# Patient Record
Sex: FEMALE | Race: BLACK OR AFRICAN AMERICAN | Employment: UNEMPLOYED | URBAN - METROPOLITAN AREA
[De-identification: names, ages, dates, MRNs, and addresses within clinical notes are randomized per-mention and may not be internally consistent; named-entity substitution may affect disease eponyms.]

---

## 2018-06-07 PROBLEM — F31.9 BIPOLAR DISORDER (HCC): Status: ACTIVE | Noted: 2018-06-07

## 2018-06-07 PROBLEM — K51.90 ULCERATIVE COLITIS (HCC): Status: ACTIVE | Noted: 2018-06-07

## 2018-06-07 PROBLEM — Z12.39 SCREENING FOR BREAST CANCER: Status: ACTIVE | Noted: 2018-06-07

## 2018-06-07 PROBLEM — N60.02 BREAST CYST, LEFT: Status: ACTIVE | Noted: 2018-06-07

## 2018-06-07 PROBLEM — I10 ESSENTIAL HYPERTENSION: Status: ACTIVE | Noted: 2018-06-07

## 2018-06-07 PROBLEM — K64.1 GRADE II HEMORRHOIDS: Status: ACTIVE | Noted: 2018-06-07

## 2018-07-26 PROBLEM — A60.09 HERPES GENITALIS IN WOMEN: Status: ACTIVE | Noted: 2018-07-26

## 2018-10-09 ENCOUNTER — APPOINTMENT (OUTPATIENT)
Dept: CT IMAGING | Age: 58
End: 2018-10-09
Attending: EMERGENCY MEDICINE
Payer: MEDICARE

## 2018-10-09 ENCOUNTER — HOSPITAL ENCOUNTER (EMERGENCY)
Age: 58
Discharge: HOME | End: 2018-10-09
Attending: EMERGENCY MEDICINE | Admitting: EMERGENCY MEDICINE
Payer: MEDICARE

## 2018-10-09 VITALS
HEIGHT: 67 IN | WEIGHT: 182 LBS | DIASTOLIC BLOOD PRESSURE: 76 MMHG | HEART RATE: 74 BPM | OXYGEN SATURATION: 99 % | SYSTOLIC BLOOD PRESSURE: 115 MMHG | BODY MASS INDEX: 28.56 KG/M2 | RESPIRATION RATE: 16 BRPM | TEMPERATURE: 98.6 F

## 2018-10-09 DIAGNOSIS — R19.7 DIARRHEA, UNSPECIFIED TYPE: ICD-10-CM

## 2018-10-09 DIAGNOSIS — R10.84 ABDOMINAL PAIN, GENERALIZED: Primary | ICD-10-CM

## 2018-10-09 LAB
ALBUMIN SERPL-MCNC: 3.1 G/DL (ref 3.5–5)
ALBUMIN/GLOB SERPL: 1 {RATIO} (ref 1.1–2.2)
ALP SERPL-CCNC: 67 U/L (ref 45–117)
ALT SERPL-CCNC: 20 U/L (ref 12–78)
ANION GAP SERPL CALC-SCNC: 8 MMOL/L (ref 5–15)
APPEARANCE UR: CLEAR
AST SERPL-CCNC: 26 U/L (ref 15–37)
BACTERIA URNS QL MICRO: ABNORMAL /HPF
BASOPHILS # BLD: 0 K/UL (ref 0–0.1)
BASOPHILS NFR BLD: 0 % (ref 0–1)
BILIRUB SERPL-MCNC: 0.3 MG/DL (ref 0.2–1)
BILIRUB UR QL: NEGATIVE
BUN SERPL-MCNC: 13 MG/DL (ref 6–20)
BUN/CREAT SERPL: 24 (ref 12–20)
CALCIUM SERPL-MCNC: 7.3 MG/DL (ref 8.5–10.1)
CHLORIDE SERPL-SCNC: 115 MMOL/L (ref 97–108)
CO2 SERPL-SCNC: 21 MMOL/L (ref 21–32)
COLOR UR: ABNORMAL
CREAT SERPL-MCNC: 0.55 MG/DL (ref 0.55–1.02)
DIFFERENTIAL METHOD BLD: ABNORMAL
EOSINOPHIL # BLD: 0.1 K/UL (ref 0–0.4)
EOSINOPHIL NFR BLD: 1 % (ref 0–7)
EPITH CASTS URNS QL MICRO: ABNORMAL /LPF
ERYTHROCYTE [DISTWIDTH] IN BLOOD BY AUTOMATED COUNT: 15.6 % (ref 11.5–14.5)
GLOBULIN SER CALC-MCNC: 3 G/DL (ref 2–4)
GLUCOSE SERPL-MCNC: 66 MG/DL (ref 65–100)
GLUCOSE UR STRIP.AUTO-MCNC: NEGATIVE MG/DL
HCT VFR BLD AUTO: 35.6 % (ref 35–47)
HGB BLD-MCNC: 11.6 G/DL (ref 11.5–16)
HGB UR QL STRIP: ABNORMAL
IMM GRANULOCYTES # BLD: 0 K/UL (ref 0–0.04)
IMM GRANULOCYTES NFR BLD AUTO: 0 % (ref 0–0.5)
KETONES UR QL STRIP.AUTO: NEGATIVE MG/DL
LEUKOCYTE ESTERASE UR QL STRIP.AUTO: ABNORMAL
LIPASE SERPL-CCNC: 161 U/L (ref 73–393)
LYMPHOCYTES # BLD: 1.8 K/UL (ref 0.8–3.5)
LYMPHOCYTES NFR BLD: 23 % (ref 12–49)
MCH RBC QN AUTO: 32.3 PG (ref 26–34)
MCHC RBC AUTO-ENTMCNC: 32.6 G/DL (ref 30–36.5)
MCV RBC AUTO: 99.2 FL (ref 80–99)
MONOCYTES # BLD: 0.5 K/UL (ref 0–1)
MONOCYTES NFR BLD: 7 % (ref 5–13)
NEUTS SEG # BLD: 5.5 K/UL (ref 1.8–8)
NEUTS SEG NFR BLD: 69 % (ref 32–75)
NITRITE UR QL STRIP.AUTO: NEGATIVE
NRBC # BLD: 0 K/UL (ref 0–0.01)
NRBC BLD-RTO: 0 PER 100 WBC
PH UR STRIP: 5.5 [PH] (ref 5–8)
PLATELET # BLD AUTO: 179 K/UL (ref 150–400)
PMV BLD AUTO: 10 FL (ref 8.9–12.9)
POTASSIUM SERPL-SCNC: 4.4 MMOL/L (ref 3.5–5.1)
PROT SERPL-MCNC: 6.1 G/DL (ref 6.4–8.2)
PROT UR STRIP-MCNC: NEGATIVE MG/DL
RBC # BLD AUTO: 3.59 M/UL (ref 3.8–5.2)
RBC #/AREA URNS HPF: ABNORMAL /HPF (ref 0–5)
SODIUM SERPL-SCNC: 144 MMOL/L (ref 136–145)
SP GR UR REFRACTOMETRY: 1.01 (ref 1–1.03)
UR CULT HOLD, URHOLD: NORMAL
UROBILINOGEN UR QL STRIP.AUTO: 0.2 EU/DL (ref 0.2–1)
WBC # BLD AUTO: 8 K/UL (ref 3.6–11)
WBC URNS QL MICRO: ABNORMAL /HPF (ref 0–4)
YEAST BUDDING URNS QL: PRESENT

## 2018-10-09 PROCEDURE — 36415 COLL VENOUS BLD VENIPUNCTURE: CPT | Performed by: EMERGENCY MEDICINE

## 2018-10-09 PROCEDURE — 74177 CT ABD & PELVIS W/CONTRAST: CPT

## 2018-10-09 PROCEDURE — 96374 THER/PROPH/DIAG INJ IV PUSH: CPT

## 2018-10-09 PROCEDURE — 74011250636 HC RX REV CODE- 250/636: Performed by: EMERGENCY MEDICINE

## 2018-10-09 PROCEDURE — 74011000258 HC RX REV CODE- 258: Performed by: EMERGENCY MEDICINE

## 2018-10-09 PROCEDURE — 96375 TX/PRO/DX INJ NEW DRUG ADDON: CPT

## 2018-10-09 PROCEDURE — 85025 COMPLETE CBC W/AUTO DIFF WBC: CPT | Performed by: EMERGENCY MEDICINE

## 2018-10-09 PROCEDURE — 81001 URINALYSIS AUTO W/SCOPE: CPT | Performed by: EMERGENCY MEDICINE

## 2018-10-09 PROCEDURE — 99284 EMERGENCY DEPT VISIT MOD MDM: CPT

## 2018-10-09 PROCEDURE — 80053 COMPREHEN METABOLIC PANEL: CPT | Performed by: EMERGENCY MEDICINE

## 2018-10-09 PROCEDURE — 74011636320 HC RX REV CODE- 636/320: Performed by: EMERGENCY MEDICINE

## 2018-10-09 PROCEDURE — 83690 ASSAY OF LIPASE: CPT | Performed by: EMERGENCY MEDICINE

## 2018-10-09 RX ORDER — SODIUM CHLORIDE 0.9 % (FLUSH) 0.9 %
10 SYRINGE (ML) INJECTION
Status: COMPLETED | OUTPATIENT
Start: 2018-10-09 | End: 2018-10-09

## 2018-10-09 RX ORDER — ONDANSETRON 2 MG/ML
4 INJECTION INTRAMUSCULAR; INTRAVENOUS
Status: COMPLETED | OUTPATIENT
Start: 2018-10-09 | End: 2018-10-09

## 2018-10-09 RX ORDER — MORPHINE SULFATE 2 MG/ML
2 INJECTION, SOLUTION INTRAMUSCULAR; INTRAVENOUS
Status: COMPLETED | OUTPATIENT
Start: 2018-10-09 | End: 2018-10-09

## 2018-10-09 RX ORDER — SODIUM CHLORIDE 0.9 % (FLUSH) 0.9 %
10 SYRINGE (ML) INJECTION
Status: DISCONTINUED | OUTPATIENT
Start: 2018-10-09 | End: 2018-10-09 | Stop reason: HOSPADM

## 2018-10-09 RX ORDER — LOPERAMIDE HCL 2 MG
2 TABLET ORAL
Qty: 16 TAB | Refills: 0 | Status: SHIPPED | OUTPATIENT
Start: 2018-10-09 | End: 2018-10-19

## 2018-10-09 RX ADMIN — IOPAMIDOL 100 ML: 755 INJECTION, SOLUTION INTRAVENOUS at 17:15

## 2018-10-09 RX ADMIN — Medication 10 ML: at 17:16

## 2018-10-09 RX ADMIN — SODIUM CHLORIDE 100 ML: 900 INJECTION, SOLUTION INTRAVENOUS at 17:16

## 2018-10-09 RX ADMIN — ONDANSETRON 4 MG: 2 INJECTION INTRAMUSCULAR; INTRAVENOUS at 15:05

## 2018-10-09 RX ADMIN — MORPHINE SULFATE 2 MG: 2 INJECTION, SOLUTION INTRAMUSCULAR; INTRAVENOUS at 15:08

## 2018-10-09 NOTE — ED PROVIDER NOTES
HPI Comments: Honey Cook is a 59 y.o. female who presents to the ED via EMS with a c/o abdominal pain onset yesterday. Pt currently rates her pain at 10/10 in severity and states that her pain worsened after eating a hamburger yesterday. Pt also reports that she has had multiple episodes of n/v (intermittently tinged with blood) and also reports bright red blood in her stool. Of note, pt states that she had a CT scan on 9/7/18 in [place] which was abnormal and pt was given GI follow up instructions. Pt states that she has a appointment with Dr. Andrew Frias on Thursday (10/11/18). Pt specifically denies chest pain, shortness of breath, fever, chills, numbness, tingling, back pain, cough, leg swelling, dizziness or any other acute sx. Pt denies any recent travel, known sick contacts, or recent illness. PCP: None PMHx significant for: Chronic Pancreatitis, HTn, DM, Hepatitis C, Cryoglobulinemia, Schizophrenia PSHx significant for: Hysterectomy Social Hx: Tobacco: none EtOH: occaisional Illicit drug use: none There are no further complaints or symptoms at this time. Signed by: Daniel Harding, rosalvaibing for Pardeep Man on October 9th, 2018 at 13:28pm. 
 
 
The history is provided by the patient and medical records. No  was used. Past Medical History:  
Diagnosis Date  Cryoglobulinemia ([facility]  Diabetes ([facility]  Hepatitis C   
 Hypertension  Schizophrenia ([facility] [address] Past Surgical History:  
Procedure Laterality Date  HX HYSTERECTOMY History reviewed. No pertinent family history. Social History Social History  Marital status: SINGLE Spouse name: N/A  
 Number of children: N/A  
 Years of education: N/A Occupational History  Not on file. Social History Main Topics  Smoking status: Never Smoker  Smokeless tobacco: Never Used  Alcohol use Yes  Drug use: No  
 Sexual activity: Not on file Other Topics Concern  Not on file Social History Narrative ALLERGIES: Actos [pioglitazone]; Bactrim [sulfamethoprim ds]; Dicyclomine; Erythromycin; Keflex [cephalexin]; Macrobid [nitrofurantoin monohyd/m-cryst]; Pcn [penicillins]; Prednisone; and Sulfa (sulfonamide antibiotics) Review of Systems Constitutional: Negative for chills and fever. Respiratory: Negative for cough and shortness of breath. Cardiovascular: Negative for chest pain. Gastrointestinal: Positive for abdominal pain, blood in stool, diarrhea, nausea and vomiting. Genitourinary: Negative for dysuria and hematuria. Musculoskeletal: Negative for back pain and neck pain. Neurological: Negative for dizziness and headaches. All other systems reviewed and are negative. Vitals:  
 10/09/18 1218 10/09/18 1300 BP: 115/76 Pulse: 74 Resp: 16 Temp: 98.6 °F (37 °C) SpO2: 97% 99% Weight: 82.6 kg (182 lb) Height: 5' 7\" (1.702 m) Physical Exam  
Constitutional: She is oriented to person, place, and time. She appears well-developed and well-nourished. No distress. HENT:  
Head: Normocephalic and atraumatic. Eyes: Pupils are equal, round, and reactive to light. Neck: Normal range of motion. Neck supple. Cardiovascular: Normal rate, regular rhythm and normal heart sounds. Exam reveals no gallop and no friction rub. No murmur heard. Pulmonary/Chest: Effort normal and breath sounds normal. No respiratory distress. She has no wheezes. Abdominal: Soft. Bowel sounds are normal. She exhibits no distension. There is tenderness (with palpation) in the epigastric area and left upper quadrant. There is no rebound and no guarding. Musculoskeletal: Normal range of motion. Neurological: She is alert and oriented to person, place, and time. Skin: Skin is warm. No rash noted. She is not diaphoretic. Psychiatric: She has a normal mood and affect.  Her behavior is normal. Judgment and thought content normal.  
 Nursing note and vitals reviewed. Signed by: Miranda Vizcaino, scribing for Brit Man on October 9th, 2018 at 13:28pm. 
 
 
[facility]M 
 
 
ED Course This is a 68-year-old female with past medical history, review of systems, physical exam as above, presenting with complaints of abdominal pain. Patient states the abdominal pain began yesterday after eating a hamburger, complains of bloody liquid stool. She states she has a history of idiopathic pancreatitis, diverticulosis, and diverticulitis. She states she is scheduled to follow with GI bleed of this week. Physical exam is remarkable for well-appearing elderly female in no acute distress with clear breath sounds, otherwise abdominal tenderness, worse in the epigastric and left upper quadrant, clear breath sounds, noted to be afebrile without tachycardia or hypotension. Differential includes pancreatitis, pancreatic pseudocyst, diverticulosis versus diverticulitis versus gastroenteritis. Plan to provide pain control, antiemetics, obtain CMP, CBC, UA, lipase, CT scan of the abdomen and pelvis. We will reevaluate, and make a disposition based the patient's diagnostics and response to therapy. Procedures BESIDE SIGN OUT: 
4:32 PM 
Discussed pt's hx, disposition, and available diagnostic and imaging results with Dr. Dena Jimenez at bedside with the patient. Reviewed care plans. Both providers and patient are in agreement with care plan. Dr. Keeley Domingo is transferring care of the pt to Dr. Dena Jimenez at this time.

## 2018-10-09 NOTE — ED NOTES
4:45 PM 
Change of shift. Care of patient taken over from Dr. Erma Andrade; H&P reviewed, bedside handoff complete. 6:46 PM 
Patient's results have been reviewed with them. Patient and/or family have verbally conveyed their understanding and agreement of the patient's signs, symptoms, diagnosis, treatment and prognosis and additionally agree to follow up as recommended or return to the Emergency Room should their condition change prior to follow-up. Discharge instructions have also been provided to the patient with some educational information regarding their diagnosis as well a list of reasons why they would want to return to the ER prior to their follow-up appointment should their condition change.

## 2018-10-09 NOTE — ED TRIAGE NOTES
Patient arrives via EMS c/o generalized abdominal pain, n/v/d, red tinged emesis and some blood noted in her adult brief. Patient states she had a hamburger from [employer] yesterday which made the pain worse. Patient has chronic pancreatitis. Patient also states she is out of her psych medications

## 2018-10-11 ENCOUNTER — HOSPITAL ENCOUNTER (EMERGENCY)
Age: 58
Discharge: HOME | End: 2018-10-12
Attending: EMERGENCY MEDICINE | Admitting: EMERGENCY MEDICINE
Payer: MEDICARE

## 2018-10-11 VITALS
SYSTOLIC BLOOD PRESSURE: 122 MMHG | TEMPERATURE: 97.8 F | HEART RATE: 87 BPM | RESPIRATION RATE: 16 BRPM | BODY MASS INDEX: 27.61 KG/M2 | OXYGEN SATURATION: 98 % | DIASTOLIC BLOOD PRESSURE: 80 MMHG | WEIGHT: 175.93 LBS | HEIGHT: 67 IN

## 2018-10-11 DIAGNOSIS — K60.2 ANAL FISSURE: Primary | ICD-10-CM

## 2018-10-11 PROCEDURE — 99282 EMERGENCY DEPT VISIT SF MDM: CPT

## 2018-10-12 NOTE — ED PROVIDER NOTES
HPI Comments: 59 y.o. female with past medical history significant for HTN, DM, h/o hepatitis C, idiopathic pancreatitis, schizophrenia, presents ambulatory to the ED with chief complaint of severe rectal pain x 2 days. Patient states the pain is sharp, burning, and stabbing in quality. It is constant, but exacerbated with sitting. She rates her current level of discomfort as a 10/10. Patient states that even though her rectal pain has only been present for the past two days, she had diarrhea and rectal bleeding before the rectal pain started. Was seen in the [address] [place] [address] ED two days ago and had blood work, urine, as well as CT abdomen/pelvis performed that were all unremarkable. Patient reports that she followed up with GI earlier today (patient does not remember the name of the physician) to discuss her pancreatitis. Notes that she does not have a colonoscopy scheduled, but notes that the study was discussed. Patient did not discuss her rectal pain with the GI specialist, and did not have a rectal exam performed. Patient reports that she has also been seen in [facility] for her pancreatitis in the past. She specifically denies any fevers, nausea, vomiting, shortness of breath, and chest pain. There are no other acute medical concerns at this time. Social hx: Denies Tobacco use; Positive EtOH use; Denies Illicit Drug Abuse PCP: None Note written by Alix Booker. Liana Parker, as dictated by Thurman Goodell, MD 11:32 PM  
 
The history is provided by the patient and medical records. No  was used. Past Medical History:  
Diagnosis Date  Cryoglobulinemia ([facility] [address]  Diabetes ([facility] [address]  Hepatitis C   
 Hypertension  Schizophrenia ([facility] [address] Past Surgical History:  
Procedure Laterality Date  HX HYSTERECTOMY History reviewed. No pertinent family history. Social History Social History  Marital status: SINGLE   Spouse name: N/A  
  Number of children: N/A  
 Years of education: N/A Occupational History  Not on file. Social History Main Topics  Smoking status: Never Smoker  Smokeless tobacco: Never Used  Alcohol use Yes  Drug use: No  
 Sexual activity: Not on file Other Topics Concern  Not on file Social History Narrative ALLERGIES: Actos [pioglitazone]; Bactrim [sulfamethoprim ds]; Dicyclomine; Erythromycin; Keflex [cephalexin]; Macrobid [nitrofurantoin monohyd/m-cryst]; Pcn [penicillins]; Prednisone; and Sulfa (sulfonamide antibiotics) Review of Systems Constitutional: Negative for fever. HENT: Negative for facial swelling and nosebleeds. Eyes: Negative for pain. Respiratory: Negative for cough, chest tightness and shortness of breath. Cardiovascular: Negative for chest pain and leg swelling. Gastrointestinal: Positive for anal bleeding, diarrhea and rectal pain. Negative for nausea and vomiting. Endocrine: Negative for polyuria. Genitourinary: Negative for difficulty urinating and flank pain. Musculoskeletal: Negative for arthralgias and back pain. Skin: Negative for color change. Allergic/Immunologic: Negative for immunocompromised state. Neurological: Negative for dizziness and headaches. Hematological: Does not bruise/bleed easily. Psychiatric/Behavioral: Negative for agitation. All other systems reviewed and are negative. Vitals:  
 10/11/18 2327 BP: 122/80 Pulse: 87 Resp: 16 Temp: 97.8 °F (36.6 °C) SpO2: 98% Weight: 79.8 kg (175 lb 14.8 oz) Height: 5' 7\" (1.702 m) Physical Exam  
Constitutional: She is oriented to person, place, and time. She appears well-developed and well-nourished. HENT:  
Head: Normocephalic and atraumatic.   
Right Ear: External ear normal.  
Left Ear: External ear normal.  
Nose: Nose normal.  
Mouth/Throat: Oropharynx is clear and moist.  
 Eyes: EOM are normal. Pupils are equal, round, and reactive to light. No scleral icterus. Neck: Normal range of motion. Neck supple. No JVD present. No tracheal deviation present. No thyromegaly present. Cardiovascular: Normal rate, regular rhythm, normal heart sounds and intact distal pulses. Exam reveals no friction rub. No murmur heard. Pulmonary/Chest: Effort normal and breath sounds normal. No stridor. No respiratory distress. She has no wheezes. She has no rales. She exhibits no tenderness. Abdominal: Soft. Bowel sounds are normal. She exhibits no distension. There is no tenderness. There is no rebound and no guarding. Genitourinary:  
Genitourinary Comments: Recta Exam: At six o'clock there is a small fissure present with no active bleeding. No abscess or fluctuance noted. No drainage or other abnormalities. Musculoskeletal: Normal range of motion. She exhibits no edema or tenderness. Lymphadenopathy:  
  She has no cervical adenopathy. Neurological: She is alert and oriented to person, place, and time. She has normal reflexes. No cranial nerve deficit. Coordination normal.  
Skin: Skin is warm and dry. No rash noted. No erythema. Psychiatric: She has a normal mood and affect. Her behavior is normal. Judgment and thought content normal.  
Nursing note and vitals reviewed. Note written by Brenna Patel. Sherrell Underwood, as dictated by Ute Rosenbaum MD 11:32 PM   
 
MDM Number of Diagnoses or Management Options Anal fissure:  
Diagnosis management comments: 47-year-old  female presents to the emergency department with rectal pain. Patient has been having diarrhea. On exam she is a small anal fissure. We'll treat with ProctoFoam. She said she saw GI today but she does not know the name. Will give followup with PCP and GI and  ProctoFoam treatment Amount and/or Complexity of Data Reviewed Tests in the radiology section of CPT®: reviewed (Reviewed CT abd from 2 days ago) Decide to obtain previous medical records or to obtain history from someone other than the patient: yes Review and summarize past medical records: yes (Reviewed ED note from 2 days ago) Independent visualization of images, tracings, or specimens: yes ED Course Procedures 11:41 PM 
Patient's results have been reviewed with them. Patient and/or family have verbally conveyed their understanding and agreement of the patient's signs, symptoms, diagnosis, treatment and prognosis and additionally agree to follow up as recommended or return to the Emergency Room should their condition change prior to follow-up. Discharge instructions have also been provided to the patient with some educational information regarding their diagnosis as well a list of reasons why they would want to return to the ER prior to their follow-up appointment should their condition change.

## 2018-10-12 NOTE — ED TRIAGE NOTES
Arrived from home, ambulatory through triage reporting rectal pain 10/10 sharp burning pain that has been going x2 days. Unknown if hemmoroids. Reports dark red blood. VSS. No distress noted.

## 2018-10-12 NOTE — DISCHARGE INSTRUCTIONS
Anal Fissure: Care Instructions  Your Care Instructions  An anal fissure is a tear in the lining of the lower rectum (anus). It can itch and cause pain. You may notice bright red blood on toilet paper after you wipe. A fissure may form if you are constipated and try to pass a large, hard stool or if you do not relax your anal muscles during a bowel movement. Most anal fissures heal with home treatment after a few days or weeks. If you have an anal fissure that takes more time to heal, your doctor may prescribe medicine. In rare cases, surgery may be needed. Anal fissures do not lead to colon cancer or other serious illnesses. However, if you have blood mixed in with the stool, talk to your doctor. Follow-up care is a key part of your treatment and safety. Be sure to make and go to all appointments, and call your doctor if you are having problems. It's also a good idea to know your test results and keep a list of the medicines you take. How can you care for yourself at home? · If your doctor prescribed cream or ointment, use it exactly as prescribed. Call your doctor if you think you are having a problem with your medicine. You will get more details on the specific medicines your doctor prescribes. · Sit in a few inches of warm water (sitz bath) 3 times a day and after bowel movements. The warm water helps the area heal and eases discomfort. Do not put soaps, salts, or shampoos in the water. · Avoid constipation:  ¨ Include fruits, vegetables, beans, and whole grains in your diet each day. These foods are high in fiber. ¨ Drink plenty of fluids, enough so that your urine is light yellow or clear like water. If you have kidney, heart, or liver disease and have to limit fluids, talk with your doctor before you increase the amount of fluids you drink. ¨ Get some exercise every day. Build up slowly to 30 to 60 minutes a day on 5 or more days of the week.   ¨ Take a fiber supplement, such as Benefiber, Citrucel, or Metamucil, every day if needed. Read and follow all instructions on the label. ¨ Use the toilet when you feel the urge. Or when you can, schedule time each day for a bowel movement. A daily routine may help. Take your time and do not strain when having a bowel movement. But do not sit on the toilet too long. · Support your feet with a small step stool when you sit on the toilet. This helps flex your hips and places your pelvis in a squatting position. · Your doctor may recommend an over-the-counter laxative, such as Miralax, Milk of Magnesia, or Ex-Lax. Read and follow all instructions on the label, and do not use these medicines on a long-term basis. · Do not use over-the-counter ointments or creams without talking to your doctor. Some of these preparations may not help. · Use baby wipes or medicated pads, such as Preparation H or Tucks, instead of toilet paper to clean after a bowel movement. These products do not irritate the anus. · Be safe with medicines. Read and follow all instructions on the label. ¨ If the doctor gave you a prescription medicine for pain, take it as prescribed. ¨ If you are not taking a prescription pain medicine, ask your doctor if you can take an over-the-counter medicine. When should you call for help? Call your doctor now or seek immediate medical care if:    · You have new or worse pain.     · You have new or worse bleeding from the rectum.    Watch closely for changes in your health, and be sure to contact your doctor if:    · You have trouble passing stools.     · You do not get better as expected. Where can you learn more? Go to http://alyson-ginna.info/. Enter X798 in the search box to learn more about \"Anal Fissure: Care Instructions. \"  Current as of: March 28, 2018  Content Version: 11.8  © 6057-3238 [employer].  Care instructions adapted under license by [employer] (which disclaims liability or warranty for this information). If you have questions about a medical condition or this instruction, always ask your healthcare professional. [place] [address] any warranty or liability for your use of this information.

## 2018-10-12 NOTE — ED NOTES
Provider reviewed discharge instructions and options with patient and patient verbalized understanding. RN reviewed discharge instructions using teachback method. Pt ambulated to exit without difficulty and in no signs of acute distress. Patient was counseled on medications prescribed at discharge. VSS at time of discharge. No complaints, needs, or questions at this time. Recommended pt to find a PCP and follow-up with them.

## 2018-10-17 ENCOUNTER — HOSPITAL ENCOUNTER (OUTPATIENT)
Dept: MRI IMAGING | Age: 58
Discharge: HOME | End: 2018-10-17
Attending: INTERNAL MEDICINE
Payer: MEDICARE

## 2018-10-17 ENCOUNTER — HOSPITAL ENCOUNTER (EMERGENCY)
Age: 58
Discharge: HOME | End: 2018-10-18
Attending: EMERGENCY MEDICINE | Admitting: EMERGENCY MEDICINE
Payer: MEDICARE

## 2018-10-17 VITALS — WEIGHT: 172 LBS | BODY MASS INDEX: 26.94 KG/M2

## 2018-10-17 DIAGNOSIS — G89.29 CHRONIC ABDOMINAL PAIN: Primary | ICD-10-CM

## 2018-10-17 DIAGNOSIS — R11.2 NON-INTRACTABLE VOMITING WITH NAUSEA, UNSPECIFIED VOMITING TYPE: ICD-10-CM

## 2018-10-17 DIAGNOSIS — R10.9 CHRONIC ABDOMINAL PAIN: Primary | ICD-10-CM

## 2018-10-17 DIAGNOSIS — Z87.19 HISTORY OF PANCREATITIS: ICD-10-CM

## 2018-10-17 DIAGNOSIS — B35.4 TINEA CORPORIS: ICD-10-CM

## 2018-10-17 DIAGNOSIS — K86.1 PANCREATITIS, CHRONIC (HCC): ICD-10-CM

## 2018-10-17 PROCEDURE — A9585 GADOBUTROL INJECTION: HCPCS | Performed by: INTERNAL MEDICINE

## 2018-10-17 PROCEDURE — 99283 EMERGENCY DEPT VISIT LOW MDM: CPT

## 2018-10-17 PROCEDURE — 74011250636 HC RX REV CODE- 250/636: Performed by: INTERNAL MEDICINE

## 2018-10-17 PROCEDURE — 36416 COLLJ CAPILLARY BLOOD SPEC: CPT | Performed by: PHYSICIAN ASSISTANT

## 2018-10-17 PROCEDURE — 83690 ASSAY OF LIPASE: CPT | Performed by: PHYSICIAN ASSISTANT

## 2018-10-17 PROCEDURE — 80053 COMPREHEN METABOLIC PANEL: CPT | Performed by: PHYSICIAN ASSISTANT

## 2018-10-17 PROCEDURE — 85025 COMPLETE CBC W/AUTO DIFF WBC: CPT | Performed by: PHYSICIAN ASSISTANT

## 2018-10-17 PROCEDURE — 74183 MRI ABD W/O CNTR FLWD CNTR: CPT

## 2018-10-17 RX ORDER — ONDANSETRON 2 MG/ML
4 INJECTION INTRAMUSCULAR; INTRAVENOUS
Status: COMPLETED | OUTPATIENT
Start: 2018-10-17 | End: 2018-10-18

## 2018-10-17 RX ADMIN — GADOBUTROL 7.5 ML: 604.72 INJECTION INTRAVENOUS at 17:00

## 2018-10-18 VITALS
TEMPERATURE: 97.6 F | RESPIRATION RATE: 20 BRPM | OXYGEN SATURATION: 100 % | WEIGHT: 172 LBS | HEIGHT: 67 IN | DIASTOLIC BLOOD PRESSURE: 77 MMHG | HEART RATE: 93 BPM | SYSTOLIC BLOOD PRESSURE: 117 MMHG | BODY MASS INDEX: 27 KG/M2

## 2018-10-18 LAB
ALBUMIN SERPL-MCNC: 3.6 G/DL (ref 3.5–5)
ALBUMIN/GLOB SERPL: 0.9 {RATIO} (ref 1.1–2.2)
ALP SERPL-CCNC: 96 U/L (ref 45–117)
ALT SERPL-CCNC: 27 U/L (ref 12–78)
ANION GAP SERPL CALC-SCNC: 11 MMOL/L (ref 5–15)
APPEARANCE UR: CLEAR
AST SERPL-CCNC: 23 U/L (ref 15–37)
BACTERIA URNS QL MICRO: NEGATIVE /HPF
BASOPHILS # BLD: 0 K/UL (ref 0–0.1)
BASOPHILS NFR BLD: 0 % (ref 0–1)
BILIRUB SERPL-MCNC: 0.3 MG/DL (ref 0.2–1)
BILIRUB UR QL: NEGATIVE
BUN SERPL-MCNC: 14 MG/DL (ref 6–20)
BUN/CREAT SERPL: 15 (ref 12–20)
CALCIUM SERPL-MCNC: 8.4 MG/DL (ref 8.5–10.1)
CHLORIDE SERPL-SCNC: 107 MMOL/L (ref 97–108)
CO2 SERPL-SCNC: 27 MMOL/L (ref 21–32)
COLOR UR: NORMAL
CREAT SERPL-MCNC: 0.95 MG/DL (ref 0.55–1.02)
DIFFERENTIAL METHOD BLD: ABNORMAL
EOSINOPHIL # BLD: 0.2 K/UL (ref 0–0.4)
EOSINOPHIL NFR BLD: 2 % (ref 0–7)
EPITH CASTS URNS QL MICRO: NORMAL /LPF
ERYTHROCYTE [DISTWIDTH] IN BLOOD BY AUTOMATED COUNT: 14.7 % (ref 11.5–14.5)
GLOBULIN SER CALC-MCNC: 3.8 G/DL (ref 2–4)
GLUCOSE SERPL-MCNC: 106 MG/DL (ref 65–100)
GLUCOSE UR STRIP.AUTO-MCNC: NEGATIVE MG/DL
HCT VFR BLD AUTO: 40.2 % (ref 35–47)
HGB BLD-MCNC: 13 G/DL (ref 11.5–16)
HGB UR QL STRIP: NEGATIVE
IMM GRANULOCYTES # BLD: 0 K/UL (ref 0–0.04)
IMM GRANULOCYTES NFR BLD AUTO: 0 % (ref 0–0.5)
KETONES UR QL STRIP.AUTO: NEGATIVE MG/DL
LEUKOCYTE ESTERASE UR QL STRIP.AUTO: NEGATIVE
LIPASE SERPL-CCNC: 139 U/L (ref 73–393)
LYMPHOCYTES # BLD: 1.9 K/UL (ref 0.8–3.5)
LYMPHOCYTES NFR BLD: 23 % (ref 12–49)
MCH RBC QN AUTO: 31.9 PG (ref 26–34)
MCHC RBC AUTO-ENTMCNC: 32.3 G/DL (ref 30–36.5)
MCV RBC AUTO: 98.5 FL (ref 80–99)
MONOCYTES # BLD: 0.7 K/UL (ref 0–1)
MONOCYTES NFR BLD: 9 % (ref 5–13)
NEUTS SEG # BLD: 5.4 K/UL (ref 1.8–8)
NEUTS SEG NFR BLD: 66 % (ref 32–75)
NITRITE UR QL STRIP.AUTO: NEGATIVE
NRBC # BLD: 0 K/UL (ref 0–0.01)
NRBC BLD-RTO: 0 PER 100 WBC
PH UR STRIP: 7 [PH] (ref 5–8)
PLATELET # BLD AUTO: 245 K/UL (ref 150–400)
PMV BLD AUTO: 9.7 FL (ref 8.9–12.9)
POTASSIUM SERPL-SCNC: 4.5 MMOL/L (ref 3.5–5.1)
PROT SERPL-MCNC: 7.4 G/DL (ref 6.4–8.2)
PROT UR STRIP-MCNC: NEGATIVE MG/DL
RBC # BLD AUTO: 4.08 M/UL (ref 3.8–5.2)
RBC #/AREA URNS HPF: NORMAL /HPF (ref 0–5)
RBC MORPH BLD: ABNORMAL
SODIUM SERPL-SCNC: 145 MMOL/L (ref 136–145)
SP GR UR REFRACTOMETRY: 1.02 (ref 1–1.03)
UR CULT HOLD, URHOLD: NORMAL
UROBILINOGEN UR QL STRIP.AUTO: 0.2 EU/DL (ref 0.2–1)
WBC # BLD AUTO: 8.2 K/UL (ref 3.6–11)
WBC URNS QL MICRO: NORMAL /HPF (ref 0–4)

## 2018-10-18 PROCEDURE — 74011250636 HC RX REV CODE- 250/636: Performed by: PHYSICIAN ASSISTANT

## 2018-10-18 PROCEDURE — 96372 THER/PROPH/DIAG INJ SC/IM: CPT

## 2018-10-18 PROCEDURE — 96374 THER/PROPH/DIAG INJ IV PUSH: CPT

## 2018-10-18 PROCEDURE — 81001 URINALYSIS AUTO W/SCOPE: CPT | Performed by: PHYSICIAN ASSISTANT

## 2018-10-18 RX ORDER — DICYCLOMINE HYDROCHLORIDE 20 MG/1
20 TABLET ORAL EVERY 6 HOURS
Qty: 15 TAB | Refills: 0 | Status: SHIPPED | OUTPATIENT
Start: 2018-10-18 | End: 2018-10-26

## 2018-10-18 RX ORDER — CHLORPHENIRAMINE MALEATE 4 MG
TABLET ORAL 2 TIMES DAILY
Qty: 1 TUBE | Refills: 0 | Status: SHIPPED | OUTPATIENT
Start: 2018-10-18 | End: 2018-10-26

## 2018-10-18 RX ORDER — ONDANSETRON 4 MG/1
4 TABLET, ORALLY DISINTEGRATING ORAL
Qty: 10 TAB | Refills: 0 | Status: SHIPPED | OUTPATIENT
Start: 2018-10-18 | End: 2018-10-26

## 2018-10-18 RX ORDER — DICYCLOMINE HYDROCHLORIDE 10 MG/ML
20 INJECTION INTRAMUSCULAR
Status: COMPLETED | OUTPATIENT
Start: 2018-10-18 | End: 2018-10-18

## 2018-10-18 RX ADMIN — DICYCLOMINE HYDROCHLORIDE 20 MG: 20 INJECTION, SOLUTION INTRAMUSCULAR at 00:35

## 2018-10-18 RX ADMIN — ONDANSETRON 4 MG: 2 INJECTION INTRAMUSCULAR; INTRAVENOUS at 00:20

## 2018-10-18 NOTE — ED TRIAGE NOTES
Pt presents with complaint of upper abdominal pain, nausea, vomiting and a rash on her ankles and hands that started yesterday.

## 2018-10-18 NOTE — DISCHARGE INSTRUCTIONS
Abdominal Pain: Care Instructions  Your Care Instructions    Abdominal pain has many possible causes. Some aren't serious and get better on their own in a few days. Others need more testing and treatment. If your pain continues or gets worse, you need to be rechecked and may need more tests to find out what is wrong. You may need surgery to correct the problem. Don't ignore new symptoms, such as fever, nausea and vomiting, urination problems, pain that gets worse, and dizziness. These may be signs of a more serious problem. Your doctor may have recommended a follow-up visit in the next 8 to 12 hours. If you are not getting better, you may need more tests or treatment. The doctor has checked you carefully, but problems can develop later. If you notice any problems or new symptoms, get medical treatment right away. Follow-up care is a key part of your treatment and safety. Be sure to make and go to all appointments, and call your doctor if you are having problems. It's also a good idea to know your test results and keep a list of the medicines you take. How can you care for yourself at home? · Rest until you feel better. · To prevent dehydration, drink plenty of fluids, enough so that your urine is light yellow or clear like water. Choose water and other caffeine-free clear liquids until you feel better. If you have kidney, heart, or liver disease and have to limit fluids, talk with your doctor before you increase the amount of fluids you drink. · If your stomach is upset, eat mild foods, such as rice, dry toast or crackers, bananas, and applesauce. Try eating several small meals instead of two or three large ones. · Wait until 48 hours after all symptoms have gone away before you have spicy foods, alcohol, and drinks that contain caffeine. · Do not eat foods that are high in fat. · Avoid anti-inflammatory medicines such as aspirin, ibuprofen (Advil, Motrin), and naproxen (Aleve).  These can cause stomach upset. Talk to your doctor if you take daily aspirin for another health problem. When should you call for help? Call 911 anytime you think you may need emergency care. For example, call if:    · You passed out (lost consciousness).     · You pass maroon or very bloody stools.     · You vomit blood or what looks like coffee grounds.     · You have new, severe belly pain.    Call your doctor now or seek immediate medical care if:    · Your pain gets worse, especially if it becomes focused in one area of your belly.     · You have a new or higher fever.     · Your stools are black and look like tar, or they have streaks of blood.     · You have unexpected vaginal bleeding.     · You have symptoms of a urinary tract infection. These may include:  ? Pain when you urinate. ? Urinating more often than usual.  ? Blood in your urine.     · You are dizzy or lightheaded, or you feel like you may faint.    Watch closely for changes in your health, and be sure to contact your doctor if:    · You are not getting better after 1 day (24 hours). Where can you learn more? Go to http://alyson-ginna.info/. Enter P703 in the search box to learn more about \"Abdominal Pain: Care Instructions. \"  Current as of: November 20, 2017  Content Version: 11.8  © 4186-9554 [employer]. Care instructions adapted under license by [employer] (which disclaims liability or warranty for this information). If you have questions about a medical condition or this instruction, always ask your healthcare professional. [place] [address] any warranty or liability for your use of this information. Nausea and Vomiting: Care Instructions  Your Care Instructions    When you are nauseated, you may feel weak and sweaty and notice a lot of saliva in your mouth. Nausea often leads to vomiting.  Most of the time you do not need to worry about nausea and vomiting, but they can be signs of other illnesses. Two common causes of nausea and vomiting are stomach flu and food poisoning. Nausea and vomiting from viral stomach flu will usually start to improve within 24 hours. Nausea and vomiting from food poisoning may last from 12 to 48 hours. The doctor has checked you carefully, but problems can develop later. If you notice any problems or new symptoms, get medical treatment right away. Follow-up care is a key part of your treatment and safety. Be sure to make and go to all appointments, and call your doctor if you are having problems. It's also a good idea to know your test results and keep a list of the medicines you take. How can you care for yourself at home? · To prevent dehydration, drink plenty of fluids, enough so that your urine is light yellow or clear like water. Choose water and other caffeine-free clear liquids until you feel better. If you have kidney, heart, or liver disease and have to limit fluids, talk with your doctor before you increase the amount of fluids you drink. · Rest in bed until you feel better. · When you are able to eat, try clear soups, mild foods, and liquids until all symptoms are gone for 12 to 48 hours. Other good choices include dry toast, crackers, cooked cereal, and gelatin dessert, such as Jell-O. When should you call for help? Call 911 anytime you think you may need emergency care. For example, call if:    · You passed out (lost consciousness).    Call your doctor now or seek immediate medical care if:    · You have symptoms of dehydration, such as:  ? Dry eyes and a dry mouth. ? Passing only a little dark urine. ?  Feeling thirstier than usual.     · You have new or worsening belly pain.     · You have a new or higher fever.     · You vomit blood or what looks like coffee grounds.    Watch closely for changes in your health, and be sure to contact your doctor if:    · You have ongoing nausea and vomiting.     · Your vomiting is getting worse.     · Your vomiting lasts longer than 2 days.     · You are not getting better as expected. Where can you learn more? Go to http://alyson-ginna.info/. Enter 25 [id number] in the search box to learn more about \"Nausea and Vomiting: Care Instructions. \"  Current as of: November 20, 2017  Content Version: 11.8  © 9946-3161 [employer]. Care instructions adapted under license by [employer] (which disclaims liability or warranty for this information). If you have questions about a medical condition or this instruction, always ask your healthcare professional. [place] [address] any warranty or liability for your use of this information.

## 2018-10-18 NOTE — ED PROVIDER NOTES
Mason Bassett is a 59 y.o. female with PMH of chronic pancreatitis, DM, hep C, schizophrenia, HTN presents to emergency room ambulatory for evaluation of vomiting, diarrhea x 2.5 days. She states she began with vomiting with occasional blood tinged vomit yesterday, associated diarrhea x 4. She denies fever, chills, CP, SOB, leg swelling. She had an MRI with MRCP done today ordered by her GI doctor and is scheduled for an oupatient colonoscopy 11/19. She is requesting pain medication and states she when she comes to the hospital she gets morphine. She denies being on pain medication, takes loperamide occasionally but hasn't lately. No sick contacts. Later c/o itchy rash to her left lateral ankle. Denies bug/insect bite. States it's only been bothering her since yesterday. CHART REVIEW: MRI abd with MRCP done around 4pm today with preliminary resulted at 10:22pm which states \"Preliminary report: No acute findings seen. \" Patient also had an unremarkable CT abd/pelvis on 10/9/18 done at [facility] when she was seen for abd pain. PCP: UNKNOWN 
GI: [facility] Surgical hx- hysterectomy Social hx- no tobacco, + ETOH; states she lives here now, use to reside on [place] The patient has no other complaints at this time. Past Medical History:  
Diagnosis Date  Cryoglobulinemia ([facility] [facility]  Diabetes ([facility] [address] [facility]  Hepatitis C   
 Hypertension  Schizophrenia ([facility] [address] Past Surgical History:  
Procedure Laterality Date  HX HYSTERECTOMY No family history on file. Social History Socioeconomic History  Marital status: SINGLE Spouse name: Not on file  Number of children: Not on file  Years of education: Not on file  Highest education level: Not on file Social Needs  Financial resource strain: Not on file  Food insecurity - worry: Not on file  Food insecurity - inability: Not on file  Transportation needs - medical: Not on file  Transportation needs - non-medical: Not on file Occupational History  Not on file Tobacco Use  Smoking status: Never Smoker  Smokeless tobacco: Never Used Substance and Sexual Activity  Alcohol use: Yes  Drug use: No  
 Sexual activity: Not on file Other Topics Concern  Not on file Social History Narrative  Not on file ALLERGIES: Actos [pioglitazone]; Bactrim [sulfamethoprim ds]; Ciprofloxacin; Dicyclomine; Erythromycin; Keflex [cephalexin]; Macrobid [nitrofurantoin monohyd/m-cryst]; Pcn [penicillins]; Prednisone; and Sulfa (sulfonamide antibiotics) Review of Systems Constitutional: Negative. Negative for activity change, chills, fatigue and unexpected weight change. Respiratory: Negative for cough, chest tightness, shortness of breath and wheezing. Cardiovascular: Negative. Negative for chest pain and palpitations. Gastrointestinal: Positive for abdominal pain (and cramping), diarrhea, nausea and vomiting. Genitourinary: Negative. Negative for dysuria, flank pain, frequency and hematuria. Musculoskeletal: Negative. Negative for arthralgias, back pain, neck pain and neck stiffness. Skin: Negative. Negative for color change and rash. Neurological: Negative. Negative for dizziness, numbness and headaches. Psychiatric/Behavioral: Negative. Negative for confusion. All other systems reviewed and are negative. Vitals:  
 10/17/18 2258 10/18/18 0030 10/18/18 0054 10/18/18 0100 BP:  110/62  117/77 Pulse:      
Resp:      
Temp:      
SpO2: 99% 99% 100% Weight:      
Height:      
      
 
Physical Exam  
Constitutional: She is oriented to person, place, and time. She appears well-developed and well-nourished. No distress. HENT:  
Head: Normocephalic and atraumatic. Eyes: Right eye exhibits no discharge. Left eye exhibits no discharge. Neck: Normal range of motion.   
Cardiovascular: Normal rate, regular rhythm, normal heart sounds and intact distal pulses. Exam reveals no gallop and no friction rub. No murmur heard. Pulmonary/Chest: Effort normal and breath sounds normal. No stridor. No respiratory distress. She has no wheezes. She has no rales. Abdominal: Soft. Normal appearance and bowel sounds are normal. She exhibits no distension and no mass. There is no tenderness. There is no rebound and no guarding. Musculoskeletal: Normal range of motion. She exhibits no tenderness or deformity. Neurological: She is alert and oriented to person, place, and time. Skin: Skin is warm and dry. Capillary refill takes less than 2 seconds. She is not diaphoretic. Psychiatric: She has a normal mood and affect. Her behavior is normal.  
Nursing note and vitals reviewed. MDM Number of Diagnoses or Management Options Chronic abdominal pain:  
History of pancreatitis:  
Non-intractable vomiting with nausea, unspecified vomiting type:  
Tinea corporis:  
Diagnosis management comments:  
Ddx: acute on chronic pancreatitis, UTI, electrolyte abnormality, drug seeking, tinea Amount and/or Complexity of Data Reviewed Clinical lab tests: ordered and reviewed Review and summarize past medical records: yes Discuss the patient with other providers: yes Independent visualization of images, tracings, or specimens: yes Patient Progress Patient progress: stable Procedures I discussed patient's PMH, exam findings as well as careplan with the ER attending who agrees with care plan. Ana Laura Carlson PA-C 
 
 reviewed, no narcotics filled under her name/ including [place], most recent Rx was in  for temazepam. 
Ana Laura Carlson PA-C  
 
12:30 AM 
Patient sleeping comfortably, awakened and she immediately states \"can I get something for pain\".  Based on my exam, labs and MRI just done there is no indication for narcotic medication and I discussed this with patient who understands and she asks for Bentyl. Will give one dose here IM, she is drinking water currently. Plan to discharge with GI f/u and symptomatic treatment. She has not vomited once or had diarrhea once since arrival. 
Milo Cazares PA-C 
 
 
 
LABORATORY TESTS: 
Recent Results (from the past 12 hour(s)) CBC WITH AUTOMATED DIFF Collection Time: 10/17/18 11:40 PM  
Result Value Ref Range WBC 8.2 3.6 - 11.0 K/uL  
 RBC 4.08 3.80 - 5.20 M/uL  
 HGB 13.0 11.5 - 16.0 g/dL HCT 40.2 35.0 - 47.0 % MCV 98.5 80.0 - 99.0 FL  
 MCH 31.9 26.0 - 34.0 PG  
 MCHC 32.3 30.0 - 36.5 g/dL  
 RDW 14.7 (H) 11.5 - 14.5 % PLATELET 796 819 - 681 K/uL MPV 9.7 8.9 - 12.9 FL  
 NRBC 0.0 0  WBC ABSOLUTE NRBC 0.00 0.00 - 0.01 K/uL NEUTROPHILS 66 32 - 75 % LYMPHOCYTES 23 12 - 49 % MONOCYTES 9 5 - 13 % EOSINOPHILS 2 0 - 7 % BASOPHILS 0 0 - 1 % IMMATURE GRANULOCYTES 0 0.0 - 0.5 % ABS. NEUTROPHILS 5.4 1.8 - 8.0 K/UL  
 ABS. LYMPHOCYTES 1.9 0.8 - 3.5 K/UL  
 ABS. MONOCYTES 0.7 0.0 - 1.0 K/UL  
 ABS. EOSINOPHILS 0.2 0.0 - 0.4 K/UL  
 ABS. BASOPHILS 0.0 0.0 - 0.1 K/UL  
 ABS. IMM. GRANS. 0.0 0.00 - 0.04 K/UL  
 DF SMEAR SCANNED    
 RBC COMMENTS NORMOCYTIC, NORMOCHROMIC METABOLIC PANEL, COMPREHENSIVE Collection Time: 10/17/18 11:40 PM  
Result Value Ref Range Sodium 145 136 - 145 mmol/L Potassium 4.5 3.5 - 5.1 mmol/L Chloride 107 97 - 108 mmol/L  
 CO2 27 21 - 32 mmol/L Anion gap 11 5 - 15 mmol/L Glucose 106 (H) 65 - 100 mg/dL BUN 14 6 - 20 MG/DL Creatinine 0.95 0.55 - 1.02 MG/DL  
 BUN/Creatinine ratio 15 12 - 20 GFR est AA >60 >60 ml/min/1.73m2 GFR est non-AA >60 >60 ml/min/1.73m2 Calcium 8.4 (L) 8.5 - 10.1 MG/DL Bilirubin, total 0.3 0.2 - 1.0 MG/DL  
 ALT (SGPT) 27 12 - 78 U/L  
 AST (SGOT) 23 15 - 37 U/L Alk. phosphatase 96 45 - 117 U/L Protein, total 7.4 6.4 - 8.2 g/dL Albumin 3.6 3.5 - 5.0 g/dL Globulin 3.8 2.0 - 4.0 g/dL A-G Ratio 0.9 (L) 1.1 - 2.2 LIPASE Collection Time: 10/17/18 11:40 PM  
Result Value Ref Range Lipase 139 73 - 393 U/L  
URINALYSIS W/MICROSCOPIC Collection Time: 10/18/18 12:15 AM  
Result Value Ref Range Color YELLOW/STRAW Appearance CLEAR CLEAR Specific gravity 1.022 1.003 - 1.030    
 pH (UA) 7.0 5.0 - 8.0 Protein NEGATIVE  NEG mg/dL Glucose NEGATIVE  NEG mg/dL Ketone NEGATIVE  NEG mg/dL Bilirubin NEGATIVE  NEG Blood NEGATIVE  NEG Urobilinogen 0.2 0.2 - 1.0 EU/dL Nitrites NEGATIVE  NEG Leukocyte Esterase NEGATIVE  NEG    
 WBC 0-4 0 - 4 /hpf  
 RBC 0-5 0 - 5 /hpf Epithelial cells FEW FEW /lpf Bacteria NEGATIVE  NEG /hpf URINE CULTURE HOLD SAMPLE Collection Time: 10/18/18 12:15 AM  
Result Value Ref Range Urine culture hold URINE ON HOLD IN MICROBIOLOGY DEPT FOR 3 DAYS. IF UNPRESERVED URINE IS SUBMITTED, IT CANNOT BE USED FOR ADDITIONAL TESTING AFTER 24 HRS, RECOLLECTION WILL BE REQUIRED. MEDICATIONS GIVEN: 
Medications  
ondansetron (ZOFRAN) injection 4 mg (4 mg IntraVENous Given 10/18/18 0020) dicyclomine (BENTYL) 10 mg/mL injection 20 mg (20 mg IntraMUSCular Given 10/18/18 0035) DISCHARGE NOTE: 
12:54 AM 
The patient's results have been reviewed with them and/or available family. Patient and/or family verbally conveyed their understanding and agreement of the patient's signs, symptoms, diagnosis, treatment and prognosis and additionally agree to follow up as recommended in the discharge instructions or to return to the Emergency Room should their condition change prior to their follow-up appointment. The patient/family verbally agrees with the care-plan and verbally conveys that all of their questions have been answered.  The discharge instructions have also been provided to the patient and/or family with some educational information regarding the patient's diagnosis as well a list of reasons why the patient would want to return to the ER prior to their follow-up appointment, should their condition change. Plan: 
1. F/U with GI- at discharge patient asks for a GI doctor in [place] to follow-up with 2. Rx Zofran, Bentyl 3. Clear liquid diet, transition to soft foods Return precautions discussed and advised to return to ER if worse

## 2018-10-18 NOTE — ED NOTES
Pt. Discharged by provider. Pt. Ambulatory out of ED with self. Pt. States pain is better at this time after injection. 5/10

## 2018-10-18 NOTE — ED NOTES
Report given to Atiya Ocasio RN on [place] at shift change for routine progression of care. Report consisted of patients Situation, Background, Assessment and Recommendations(SBAR). Information from the following report(s) SBAR, ED Summary, [facility] and Recent Results was reviewed with the receiving nurse. Opportunity for questions and clarification was provided.

## 2018-10-18 NOTE — ED NOTES
Patient requesting cab home. Pt stated that [facility]/Dr. Jayjay Juarez paid for cab ride for patient and mother to come over to [facility] to be seen. Pt was informed that vouchers were not provided for pts to get home, pt stated \"then you need to given us a bus pass or something\" pt was asked to call a cab or family/friends stated she did not have any money or anyone she was able to call. Pt was sent to waiting room where mother is waiting, informed by Juliana Almendarez that we would attempt to figure out a way to get pt and mother home.

## 2018-10-21 ENCOUNTER — HOSPITAL ENCOUNTER (INPATIENT)
Age: 58
LOS: 4 days | Discharge: HOME | DRG: 885 | End: 2018-10-26
Attending: EMERGENCY MEDICINE | Admitting: PSYCHIATRY & NEUROLOGY
Payer: MEDICARE

## 2018-10-21 DIAGNOSIS — F31.74 BIPOLAR DISORDER, IN FULL REMISSION, MOST RECENT EPISODE MANIC (HCC): Primary | ICD-10-CM

## 2018-10-21 LAB
ALBUMIN SERPL-MCNC: 3.8 G/DL (ref 3.4–5)
ALBUMIN/GLOB SERPL: 1 {RATIO} (ref 0.8–1.7)
ALP SERPL-CCNC: 90 U/L (ref 45–117)
ALT SERPL-CCNC: 24 U/L (ref 13–56)
AMPHET UR QL SCN: NEGATIVE
ANION GAP SERPL CALC-SCNC: 6 MMOL/L (ref 3–18)
APPEARANCE UR: CLEAR
AST SERPL-CCNC: 23 U/L (ref 15–37)
BACTERIA URNS QL MICRO: ABNORMAL /HPF
BARBITURATES UR QL SCN: NEGATIVE
BASOPHILS # BLD: 0 K/UL (ref 0–0.1)
BASOPHILS NFR BLD: 0 % (ref 0–2)
BENZODIAZ UR QL: NEGATIVE
BILIRUB SERPL-MCNC: 0.2 MG/DL (ref 0.2–1)
BILIRUB UR QL: NEGATIVE
BUN SERPL-MCNC: 12 MG/DL (ref 7–18)
BUN/CREAT SERPL: 17 (ref 12–20)
CALCIUM SERPL-MCNC: 8.3 MG/DL (ref 8.5–10.1)
CANNABINOIDS UR QL SCN: NEGATIVE
CHLORIDE SERPL-SCNC: 108 MMOL/L (ref 100–108)
CO2 SERPL-SCNC: 29 MMOL/L (ref 21–32)
COCAINE UR QL SCN: NEGATIVE
COLOR UR: YELLOW
CREAT SERPL-MCNC: 0.72 MG/DL (ref 0.6–1.3)
DIFFERENTIAL METHOD BLD: ABNORMAL
EOSINOPHIL # BLD: 0.1 K/UL (ref 0–0.4)
EOSINOPHIL NFR BLD: 1 % (ref 0–5)
EPITH CASTS URNS QL MICRO: ABNORMAL /LPF (ref 0–5)
ERYTHROCYTE [DISTWIDTH] IN BLOOD BY AUTOMATED COUNT: 14.3 % (ref 11.6–14.5)
ETHANOL SERPL-MCNC: <3 MG/DL (ref 0–3)
GLOBULIN SER CALC-MCNC: 4 G/DL (ref 2–4)
GLUCOSE SERPL-MCNC: 80 MG/DL (ref 74–99)
GLUCOSE UR STRIP.AUTO-MCNC: NEGATIVE MG/DL
HCT VFR BLD AUTO: 35.6 % (ref 35–45)
HDSCOM,HDSCOM: NORMAL
HGB BLD-MCNC: 12.4 G/DL (ref 12–16)
HGB UR QL STRIP: ABNORMAL
KETONES UR QL STRIP.AUTO: NEGATIVE MG/DL
LEUKOCYTE ESTERASE UR QL STRIP.AUTO: ABNORMAL
LYMPHOCYTES # BLD: 2.2 K/UL (ref 0.9–3.6)
LYMPHOCYTES NFR BLD: 25 % (ref 21–52)
MCH RBC QN AUTO: 32 PG (ref 24–34)
MCHC RBC AUTO-ENTMCNC: 34.8 G/DL (ref 31–37)
MCV RBC AUTO: 92 FL (ref 74–97)
METHADONE UR QL: NEGATIVE
MONOCYTES # BLD: 0.7 K/UL (ref 0.05–1.2)
MONOCYTES NFR BLD: 8 % (ref 3–10)
MUCOUS THREADS URNS QL MICRO: ABNORMAL /LPF
NEUTS SEG # BLD: 5.6 K/UL (ref 1.8–8)
NEUTS SEG NFR BLD: 66 % (ref 40–73)
NITRITE UR QL STRIP.AUTO: NEGATIVE
OPIATES UR QL: NEGATIVE
PCP UR QL: NEGATIVE
PH UR STRIP: 5.5 [PH] (ref 5–8)
PLATELET # BLD AUTO: 253 K/UL (ref 135–420)
PMV BLD AUTO: 9.4 FL (ref 9.2–11.8)
POTASSIUM SERPL-SCNC: 3.9 MMOL/L (ref 3.5–5.5)
PROT SERPL-MCNC: 7.8 G/DL (ref 6.4–8.2)
PROT UR STRIP-MCNC: NEGATIVE MG/DL
RBC # BLD AUTO: 3.87 M/UL (ref 4.2–5.3)
RBC #/AREA URNS HPF: ABNORMAL /HPF (ref 0–5)
SODIUM SERPL-SCNC: 143 MMOL/L (ref 136–145)
SP GR UR REFRACTOMETRY: 1.02 (ref 1–1.03)
UROBILINOGEN UR QL STRIP.AUTO: 0.2 EU/DL (ref 0.2–1)
WBC # BLD AUTO: 8.5 K/UL (ref 4.6–13.2)
WBC URNS QL MICRO: ABNORMAL /HPF (ref 0–4)
YEAST URNS QL MICRO: ABNORMAL

## 2018-10-21 PROCEDURE — 85025 COMPLETE CBC W/AUTO DIFF WBC: CPT | Performed by: NURSE PRACTITIONER

## 2018-10-21 PROCEDURE — 81001 URINALYSIS AUTO W/SCOPE: CPT | Performed by: NURSE PRACTITIONER

## 2018-10-21 PROCEDURE — 80307 DRUG TEST PRSMV CHEM ANLYZR: CPT | Performed by: NURSE PRACTITIONER

## 2018-10-21 PROCEDURE — 99283 EMERGENCY DEPT VISIT LOW MDM: CPT

## 2018-10-21 PROCEDURE — 80053 COMPREHEN METABOLIC PANEL: CPT | Performed by: NURSE PRACTITIONER

## 2018-10-22 LAB
GLUCOSE BLD STRIP.AUTO-MCNC: 111 MG/DL (ref 70–110)
GLUCOSE BLD STRIP.AUTO-MCNC: 226 MG/DL (ref 70–110)
GLUCOSE BLD STRIP.AUTO-MCNC: 83 MG/DL (ref 70–110)

## 2018-10-22 PROCEDURE — 74011250637 HC RX REV CODE- 250/637: Performed by: PSYCHIATRY & NEUROLOGY

## 2018-10-22 PROCEDURE — 82962 GLUCOSE BLOOD TEST: CPT

## 2018-10-22 PROCEDURE — 74011636637 HC RX REV CODE- 636/637: Performed by: PSYCHIATRY & NEUROLOGY

## 2018-10-22 PROCEDURE — 65220000003 HC RM SEMIPRIVATE PSYCH

## 2018-10-22 RX ORDER — IBUPROFEN 400 MG/1
400 TABLET ORAL
Status: DISCONTINUED | OUTPATIENT
Start: 2018-10-22 | End: 2018-10-26 | Stop reason: HOSPADM

## 2018-10-22 RX ORDER — MAGNESIUM SULFATE 100 %
16 CRYSTALS MISCELLANEOUS AS NEEDED
Status: DISCONTINUED | OUTPATIENT
Start: 2018-10-22 | End: 2018-10-26 | Stop reason: HOSPADM

## 2018-10-22 RX ORDER — FLUPHENAZINE HYDROCHLORIDE 2.5 MG/ML
5 INJECTION, SOLUTION INTRAMUSCULAR
Status: DISCONTINUED | OUTPATIENT
Start: 2018-10-22 | End: 2018-10-26 | Stop reason: HOSPADM

## 2018-10-22 RX ORDER — LORAZEPAM 1 MG/1
1-2 TABLET ORAL
Status: DISCONTINUED | OUTPATIENT
Start: 2018-10-22 | End: 2018-10-26 | Stop reason: HOSPADM

## 2018-10-22 RX ORDER — DIPHENHYDRAMINE HCL 25 MG
50 CAPSULE ORAL
Status: DISCONTINUED | OUTPATIENT
Start: 2018-10-22 | End: 2018-10-26 | Stop reason: HOSPADM

## 2018-10-22 RX ORDER — OXYBUTYNIN CHLORIDE 10 MG/1
10 TABLET, EXTENDED RELEASE ORAL DAILY
Status: DISCONTINUED | OUTPATIENT
Start: 2018-10-22 | End: 2018-10-26 | Stop reason: HOSPADM

## 2018-10-22 RX ORDER — INSULIN LISPRO 100 [IU]/ML
INJECTION, SOLUTION INTRAVENOUS; SUBCUTANEOUS
Status: DISCONTINUED | OUTPATIENT
Start: 2018-10-22 | End: 2018-10-26 | Stop reason: HOSPADM

## 2018-10-22 RX ORDER — LORAZEPAM 2 MG/ML
1-2 INJECTION INTRAMUSCULAR
Status: DISCONTINUED | OUTPATIENT
Start: 2018-10-22 | End: 2018-10-26 | Stop reason: HOSPADM

## 2018-10-22 RX ORDER — HYDRALAZINE HYDROCHLORIDE 25 MG/1
25 TABLET, FILM COATED ORAL 2 TIMES DAILY
Status: DISCONTINUED | OUTPATIENT
Start: 2018-10-22 | End: 2018-10-26 | Stop reason: HOSPADM

## 2018-10-22 RX ORDER — DOXAZOSIN 1 MG/1
1 TABLET ORAL DAILY
Status: DISCONTINUED | OUTPATIENT
Start: 2018-10-22 | End: 2018-10-22

## 2018-10-22 RX ORDER — VALACYCLOVIR HYDROCHLORIDE 500 MG/1
1000 TABLET, FILM COATED ORAL 2 TIMES DAILY
Status: DISCONTINUED | OUTPATIENT
Start: 2018-10-22 | End: 2018-10-26 | Stop reason: HOSPADM

## 2018-10-22 RX ORDER — ONDANSETRON 4 MG/1
4 TABLET, ORALLY DISINTEGRATING ORAL
Status: DISCONTINUED | OUTPATIENT
Start: 2018-10-22 | End: 2018-10-26 | Stop reason: HOSPADM

## 2018-10-22 RX ORDER — PRAZOSIN HYDROCHLORIDE 1 MG/1
1 CAPSULE ORAL
Status: DISCONTINUED | OUTPATIENT
Start: 2018-10-22 | End: 2018-10-22 | Stop reason: SDUPTHER

## 2018-10-22 RX ORDER — ROSUVASTATIN CALCIUM 20 MG/1
20 TABLET, COATED ORAL
Status: DISCONTINUED | OUTPATIENT
Start: 2018-10-22 | End: 2018-10-26 | Stop reason: HOSPADM

## 2018-10-22 RX ORDER — LOPERAMIDE HYDROCHLORIDE 2 MG/1
2 CAPSULE ORAL
Status: DISCONTINUED | OUTPATIENT
Start: 2018-10-22 | End: 2018-10-26 | Stop reason: HOSPADM

## 2018-10-22 RX ORDER — RALOXIFENE HYDROCHLORIDE 60 MG/1
60 TABLET, FILM COATED ORAL DAILY
Status: DISCONTINUED | OUTPATIENT
Start: 2018-10-22 | End: 2018-10-26 | Stop reason: HOSPADM

## 2018-10-22 RX ORDER — ESTRADIOL 1 MG/1
1 TABLET ORAL DAILY
Status: DISCONTINUED | OUTPATIENT
Start: 2018-10-22 | End: 2018-10-26 | Stop reason: HOSPADM

## 2018-10-22 RX ORDER — MIRTAZAPINE 15 MG/1
7.5 TABLET, FILM COATED ORAL
COMMUNITY
End: 2018-10-26

## 2018-10-22 RX ORDER — DOXAZOSIN 1 MG/1
1 TABLET ORAL
Status: DISCONTINUED | OUTPATIENT
Start: 2018-10-22 | End: 2018-10-26 | Stop reason: HOSPADM

## 2018-10-22 RX ORDER — FLUPHENAZINE HYDROCHLORIDE 5 MG/1
5 TABLET ORAL
Status: DISCONTINUED | OUTPATIENT
Start: 2018-10-22 | End: 2018-10-26 | Stop reason: HOSPADM

## 2018-10-22 RX ORDER — MESALAMINE 400 MG/1
800 CAPSULE, DELAYED RELEASE ORAL 3 TIMES DAILY
Status: DISCONTINUED | OUTPATIENT
Start: 2018-10-22 | End: 2018-10-26 | Stop reason: HOSPADM

## 2018-10-22 RX ORDER — PANTOPRAZOLE SODIUM 40 MG/1
40 TABLET, DELAYED RELEASE ORAL
Status: DISCONTINUED | OUTPATIENT
Start: 2018-10-23 | End: 2018-10-26 | Stop reason: HOSPADM

## 2018-10-22 RX ADMIN — LORAZEPAM 1 MG: 1 TABLET ORAL at 20:02

## 2018-10-22 RX ADMIN — INSULIN LISPRO 4 UNITS: 100 INJECTION, SOLUTION INTRAVENOUS; SUBCUTANEOUS at 11:57

## 2018-10-22 RX ADMIN — OXYBUTYNIN CHLORIDE 10 MG: 10 TABLET, FILM COATED, EXTENDED RELEASE ORAL at 11:51

## 2018-10-22 RX ADMIN — ONDANSETRON 4 MG: 4 TABLET, ORALLY DISINTEGRATING ORAL at 11:55

## 2018-10-22 RX ADMIN — VALACYCLOVIR HYDROCHLORIDE 1000 MG: 500 TABLET, FILM COATED ORAL at 11:50

## 2018-10-22 RX ADMIN — MESALAMINE 800 MG: 400 CAPSULE, DELAYED RELEASE ORAL at 20:54

## 2018-10-22 RX ADMIN — RALOXIFENE HYDROCHLORIDE 60 MG: 60 TABLET, FILM COATED ORAL at 11:50

## 2018-10-22 RX ADMIN — ROSUVASTATIN CALCIUM 20 MG: 20 TABLET, FILM COATED ORAL at 21:05

## 2018-10-22 RX ADMIN — VALACYCLOVIR HYDROCHLORIDE 1000 MG: 500 TABLET, FILM COATED ORAL at 20:03

## 2018-10-22 RX ADMIN — MESALAMINE 800 MG: 400 CAPSULE, DELAYED RELEASE ORAL at 13:39

## 2018-10-22 RX ADMIN — SITAGLIPTIN 100 MG: 100 TABLET, FILM COATED ORAL at 11:51

## 2018-10-22 RX ADMIN — LOPERAMIDE HYDROCHLORIDE 2 MG: 2 CAPSULE ORAL at 11:55

## 2018-10-22 RX ADMIN — ESTRADIOL 1 MG: 1 TABLET ORAL at 11:00

## 2018-10-22 RX ADMIN — HYDRALAZINE HYDROCHLORIDE 25 MG: 25 TABLET, FILM COATED ORAL at 11:51

## 2018-10-22 NOTE — BSMART NOTE
ART THERAPY GROUP PROGRESS NOTE PATIENT SCHEDULED FOR GROUP AT: 13:15 
 
ATTENDANCE: Full PARTICIPATION LEVEL: Participates fully in the art process ATTENTION LEVEL: Able to focus on task FOCUS: Grounding SYMBOLIC & THEMATIC CONTENT AS NOTED IN IMAGERY: she initially complained of a stomach ache, but after she received her medication she became invested in the task at hand. She was calm and compliant. She kept to herself unless directly prompted. Approach to task was somewhat disorganized and poorly planned.

## 2018-10-22 NOTE — PROGRESS NOTES
conducted an initial consultation and Spiritual Assessment for Sea Grigsby, who is a 59 y.o.,female. Patients Primary Language is: [place]. According to the patients EMR Episcopal Affiliation is: No preference. The reason the Patient came to the hospital is:  
Patient Active Problem List  
 Diagnosis Date Noted  Herpes genitalis in women 07/26/2018  Breast cyst, left 06/07/2018  Bipolar disorder ([address] 06/07/2018  Ulcerative colitis ([facility] [address] 06/07/2018  Screening for breast cancer 06/07/2018  Grade II hemorrhoids 06/07/2018  Essential hypertension 06/07/2018  Chronic hepatitis C ([facility] [address] 08/13/2014  Schizophrenia ([facility] [address] 08/13/2014  Cryoglobulinemia ([facility] [address] 08/13/2014  Endometriosis 08/13/2014  Diabetes mellitus ([facility] [address] 08/13/2014  Hypercholesterolemia 08/13/2014  S/P MIKE (total abdominal hysterectomy) 08/13/2014 The  provided the following Interventions: 
Initiated a relationship of care and support. Explored issues of manoj, belief, spirituality and Faith/ritual needs while hospitalized. Listened empathically. Provided chaplaincy education. Provided information about Spiritual Care Services. Offered prayer and assurance of continued prayers on patient's behalf. Chart reviewed. The following outcomes where achieved: 
Patient shared limited information about both their medical narrative and spiritual journey/beliefs. Patient processed feeling about current hospitalization. Patient expressed gratitude for 's visit. Assessment: 
Patient does not have any Faith/cultural needs that will affect patients preferences in health care. There are no spiritual or Faith issues which require intervention at this time. Plan: 
Chaplains will continue to follow and will provide pastoral care on an as needed/requested basis.  
 recommends bedside caregivers page  on duty if patient shows signs of acute spiritual or emotional distress. [address] [facility] Spiritual Care  
[phone]

## 2018-10-22 NOTE — BSMART NOTE
SOCIAL WORK GROUP THERAPY PROGRESS NOTE Group Time:  10am 
 
Group Topic:  Coping Skills Group Participation: Pt was unavailable for group.

## 2018-10-22 NOTE — BSMART NOTE
Comprehensive Assessment Form Part 1 Section I - Disposition The Medical Doctor to Psychiatrist conference was completed. The Medical Doctor is in agreement with Psychiatrist disposition because of (reason) suicidal ideation with command auditory hallucinations to kill herself. The plan is admit. The on-call Psychiatrist consulted was Dr. Kay Perez. The admitting Psychiatrist will be Dr. Ana Laura Hickman. The admitting Diagnosis is Schizophrenia. Admitted to room 112/02 Unit STU2 Section II - Integrated Summary Summary:  59year old female who presented voluntarily to the ER reporting suicidal ideations and command auditory hallucinations to kill herself. ( Patient presented to the ER with her Mother who also has mental illness. Patient expressed concern over her Mothers treatment as they are both currently homeless.) Interviewed in [employer] [facility] @ the request of Abdirashid Olmos NP. Patient dressed appropriately in her personal attire. Alert and oriented. Cooperative with interview. Patient states she has not had her medications to treat her Schizophrenia and Bipolar 2 disorder ( Cariprazine 1.5 mg bid) as she ran out and did not have a refill for it. States she last received a prescription for this medication in September when in [place]. Patient states she is feeling suicidal and reports hearing voices that are telling her to kill herself. She is also reporting feeling of paranoia feeling people are talking about her, following her, or watching her. Reports her sleep is greatly disturbed with frequent awakenings during the night. Denied any thoughts to harm others. Inpatient: reports her last inpatient hospitalization was in July in [place]. Reports in March was hospitalized in [place] and also attended a day program there. Outpatient: no current provider Legal: denied Medications: psychiatric medication is Cariprazine 1.5 mg bid.  Patient also has numerous medications for medical conditions. The patient is deemed competent to provide informed consent. The Chief Complaint is suicidal ideations, command auditory hallucination to kill herself, and paranoia Eladio Hewitt [employer] are ran out of her Psychiatric medication to treat her Schizophrenia. Presumed homelessness. Eladio Hewitt Section V - Substance Abuse The patient is not using substances. Urine drug screen is negative BAL is 0 Cordelia Michaels RN

## 2018-10-22 NOTE — ED PROVIDER NOTES
9:44 PM Pam Carpenter is a 59 y.o. female with a history of htn, diabetes, hep c, schizophrenia who presents to ED for a mental health evaluation, pt states that she has not had her medication. No medical complaints tonight No other complaints, associated symptoms or modifying factors at this time. PCP: UNKNOWN The history is provided by the patient. No  was used. Suicidal  
This is a new problem. The current episode started 12 to 24 hours ago. The problem has not changed since onset. Nothing aggravates the symptoms. Nothing relieves the symptoms. She has tried nothing for the symptoms. Past Medical History:  
Diagnosis Date  Cryoglobulinemia ([facility]  Diabetes ([facility]  Hepatitis C   
 Hypertension  Schizophrenia ([facility] Past Surgical History:  
Procedure Laterality Date  HX HYSTERECTOMY No family history on file. Social History Socioeconomic History  Marital status: SINGLE Spouse name: Not on file  Number of children: Not on file  Years of education: Not on file  Highest education level: Not on file Social Needs  Financial resource strain: Not on file  Food insecurity - worry: Not on file  Food insecurity - inability: Not on file  Transportation needs - medical: Not on file  Transportation needs - non-medical: Not on file Occupational History  Not on file Tobacco Use  Smoking status: Never Smoker  Smokeless tobacco: Never Used Substance and Sexual Activity  Alcohol use: Yes  Drug use: No  
 Sexual activity: Not on file Other Topics Concern  Not on file Social History Narrative  Not on file ALLERGIES: Actos [pioglitazone]; Bactrim [sulfamethoprim ds]; Ciprofloxacin; Dicyclomine; Erythromycin; Keflex [cephalexin]; Macrobid [nitrofurantoin monohyd/m-cryst]; Pcn [penicillins]; Prednisone; and Sulfa (sulfonamide antibiotics) Review of Systems Constitutional: Negative for fever. Psychiatric/Behavioral: Positive for suicidal ideas. Negative for self-injury. All other systems reviewed and are negative. Vitals:  
 10/21/18 1822 BP: 129/74 Pulse: 90 Resp: 16 Temp: 97.5 °F (36.4 °C) SpO2: 100% Weight: 78 kg (172 lb) Physical Exam  
Constitutional: She is oriented to person, place, and time. She appears well-developed and well-nourished. HENT:  
Head: Normocephalic and atraumatic. Eyes: Conjunctivae and EOM are normal. Pupils are equal, round, and reactive to light. Neck: Normal range of motion. Neck supple. Cardiovascular: Normal rate and regular rhythm. Pulmonary/Chest: Effort normal and breath sounds normal.  
Abdominal: Soft. Bowel sounds are normal. There is no tenderness. Musculoskeletal: Normal range of motion. Neurological: She is alert and oriented to person, place, and time. She has normal reflexes. Skin: Skin is warm and dry. Psychiatric: Judgment normal. Her speech is delayed. She is withdrawn. She exhibits a depressed mood. She expresses suicidal ideation. Nursing note and vitals reviewed. MDM Number of Diagnoses or Management Options Bipolar disorder, in full remission, most recent episode manic [facility]):  
Diagnosis management comments:  Ferd Pry with crisis informed of medical clearance. Per time with crisis pt will be admitted to inpatient mental health unit Amount and/or Complexity of Data Reviewed Clinical lab tests: ordered and reviewed Review and summarize past medical records: yes Independent visualization of images, tracings, or specimens: yes Risk of Complications, Morbidity, and/or Mortality Presenting problems: moderate Diagnostic procedures: moderate Management options: moderate Patient Progress Patient progress: stable Procedures Vitals: 
Patient Vitals for the past 12 hrs: 
 Temp Pulse Resp BP SpO2 10/21/18 1822 97.5 °F (36.4 °C) 90 16 129/74 100 % Medications ordered:  
Medications - No data to display Lab findings: 
Recent Results (from the past 12 hour(s)) URINALYSIS W/ RFLX MICROSCOPIC Collection Time: 10/21/18  7:41 PM  
Result Value Ref Range Color YELLOW Appearance CLEAR Specific gravity 1.023 1.005 - 1.030    
 pH (UA) 5.5 5.0 - 8.0 Protein NEGATIVE  NEG mg/dL Glucose NEGATIVE  NEG mg/dL Ketone NEGATIVE  NEG mg/dL Bilirubin NEGATIVE  NEG Blood MODERATE (A) NEG Urobilinogen 0.2 0.2 - 1.0 EU/dL Nitrites NEGATIVE  NEG Leukocyte Esterase TRACE (A) NEG    
DRUG SCREEN, URINE Collection Time: 10/21/18  7:41 PM  
Result Value Ref Range BENZODIAZEPINES NEGATIVE  NEG    
 BARBITURATES NEGATIVE  NEG    
 THC (TH-CANNABINOL) NEGATIVE  NEG    
 OPIATES NEGATIVE  NEG    
 PCP(PHENCYCLIDINE) NEGATIVE  NEG    
 COCAINE NEGATIVE  NEG    
 AMPHETAMINES NEGATIVE  NEG METHADONE NEGATIVE  NEG HDSCOM (NOTE) URINE MICROSCOPIC ONLY Collection Time: 10/21/18  7:41 PM  
Result Value Ref Range WBC 4 to 10 0 - 4 /hpf  
 RBC 4 to 10 0 - 5 /hpf Epithelial cells 3+ 0 - 5 /lpf Bacteria 4+ (A) NEG /hpf Mucus 2+ (A) NEG /lpf Yeast FEW (A) NEG    
CBC WITH AUTOMATED DIFF Collection Time: 10/21/18  7:43 PM  
Result Value Ref Range WBC 8.5 4.6 - 13.2 K/uL  
 RBC 3.87 (L) 4.20 - 5.30 M/uL  
 HGB 12.4 12.0 - 16.0 g/dL HCT 35.6 35.0 - 45.0 % MCV 92.0 74.0 - 97.0 FL  
 MCH 32.0 24.0 - 34.0 PG  
 MCHC 34.8 31.0 - 37.0 g/dL  
 RDW 14.3 11.6 - 14.5 % PLATELET 741 769 - 617 K/uL MPV 9.4 9.2 - 11.8 FL  
 NEUTROPHILS 66 40 - 73 % LYMPHOCYTES 25 21 - 52 % MONOCYTES 8 3 - 10 % EOSINOPHILS 1 0 - 5 % BASOPHILS 0 0 - 2 %  
 ABS. NEUTROPHILS 5.6 1.8 - 8.0 K/UL  
 ABS. LYMPHOCYTES 2.2 0.9 - 3.6 K/UL  
 ABS. MONOCYTES 0.7 0.05 - 1.2 K/UL  
 ABS. EOSINOPHILS 0.1 0.0 - 0.4 K/UL  
 ABS. BASOPHILS 0.0 0.0 - 0.1 K/UL DF AUTOMATED METABOLIC PANEL, COMPREHENSIVE Collection Time: 10/21/18  7:43 PM  
Result Value Ref Range Sodium 143 136 - 145 mmol/L Potassium 3.9 3.5 - 5.5 mmol/L Chloride 108 100 - 108 mmol/L  
 CO2 29 21 - 32 mmol/L Anion gap 6 3.0 - 18 mmol/L Glucose 80 74 - 99 mg/dL BUN 12 7.0 - 18 MG/DL Creatinine 0.72 0.6 - 1.3 MG/DL  
 BUN/Creatinine ratio 17 12 - 20 GFR est AA >60 >60 ml/min/1.73m2 GFR est non-AA >60 >60 ml/min/1.73m2 Calcium 8.3 (L) 8.5 - 10.1 MG/DL Bilirubin, total 0.2 0.2 - 1.0 MG/DL  
 ALT (SGPT) 24 13 - 56 U/L  
 AST (SGOT) 23 15 - 37 U/L Alk. phosphatase 90 45 - 117 U/L Protein, total 7.8 6.4 - 8.2 g/dL Albumin 3.8 3.4 - 5.0 g/dL Globulin 4.0 2.0 - 4.0 g/dL A-G Ratio 1.0 0.8 - 1.7 ETHYL ALCOHOL Collection Time: 10/21/18  7:43 PM  
Result Value Ref Range ALCOHOL(ETHYL),SERUM <3 0 - 3 MG/DL Disposition: 
 
Diagnosis:  
1. Bipolar disorder, in full remission, most recent episode manic ([facility] Disposition: admitted to inpatient mental health unit Medication List  
  
ASK your doctor about these medications   
acyclovir 400 mg tablet Commonly known as:  ZOVIRAX Take 1 Tab by mouth three (3) times daily. cariprazine 3 mg capsule Commonly known as:  Martell Clamp Take 1 Cap by mouth daily. clotrimazole 1 % topical cream 
Commonly known as:  Brunner Marquis Apply  to affected area two (2) times a day for 30 days. Apply twice a day for 2-4 weeks 
  
dicyclomine 20 mg tablet Commonly known as:  BENTYL Take 1 Tab by mouth every six (6) hours. diphenhydrAMINE 50 mg tablet Commonly known as:  BENADRYL 
  
divalproex  mg tablet Commonly known as:  DEPAKOTE Take 1 Tab by mouth two (2) times a day. doxazosin 1 mg tablet Commonly known as:  CARDURA Take 1 Tab by mouth daily. estradiol 1 mg tablet Commonly known as:  ESTRACE Take 1 Tab by mouth daily. fenofibrate nanocrystallized 48 mg tablet Commonly known as:  TRICOR 
  
haloperidol 5 mg tablet Commonly known as:  HALDOL 
  
hydrALAZINE 25 mg tablet Commonly known as:  APRESOLINE Take 1 Tab by mouth two (2) times a day. hydrocortisone-pramoxine rectal foam 
Commonly known as:  PROCTOFOAM HC Insert 1 Applicator into rectum two (2) times a day. loperamide 2 mg tablet Commonly known as:  IMMODIUM Take 1 Tab by mouth four (4) times daily as needed for Diarrhea. mesalamine  mg DR tablet Commonly known as:  ASACOL HD Take 1 Tab by mouth three (3) times daily. metoprolol tartrate 25 mg tablet Commonly known as:  LOPRESSOR 
  
naloxone 4 mg/actuation nasal spray Commonly known as:  [employer] Use 1 spray intranasally into 1 nostril. Use a new Narcan nasal spray for subsequent doses and administer into alternating nostrils. May repeat every 2 to 3 minutes as needed. NASONEX 50 mcg/actuation nasal spray Generic drug:  mometasone 
  
nitroglycerin 0.4 % (w/w) ointment Commonly known as:  RECTIV Insert  into rectum every twelve (12) hours every twelve (12) hours. omeprazole 40 mg capsule Commonly known as:  PRILOSEC Take 1 Cap by mouth daily. ondansetron 4 mg disintegrating tablet Commonly known as:  ZOFRAN ODT Take 1 Tab by mouth every eight (8) hours as needed for Nausea. ondansetron hcl 4 mg tablet Commonly known as:  Loyda Marlon Take 1 Tab by mouth every eight (8) hours as needed for Nausea. oxybutynin 5 mg tablet Commonly known as:  XKTJYROE Take 1 Tab by mouth two (2) times a day. oxyCODONE-acetaminophen 5-325 mg per tablet Commonly known as:  PERCOCET Take 1 Tab by mouth every four (4) hours as needed for Pain. Max Daily Amount: 6 Tabs. prazosin 1 mg capsule Commonly known as:  MINIPRESS 
  
promethazine 25 mg tablet Commonly known as:  PHENERGAN Take 1 Tab by mouth every six (6) hours as needed. raloxifene 60 mg tablet Commonly known as:  EVISTA Take 1 Tab by mouth daily. risperiDONE 4 mg tablet Commonly known as:  RisperDAL Take 2 Tabs by mouth nightly. rosuvastatin 20 mg tablet Commonly known as:  CRESTOR Take 1 Tab by mouth nightly. SITagliptin 50 mg tablet Commonly known as:  Taniya Ipswich Take 1 Tab by mouth daily. temazepam 15 mg capsule Commonly known as:  RESTORIL Take 1 Cap by mouth nightly as needed for Sleep. Max Daily Amount: 15 mg. 
  
triamcinolone acetonide 0.1 % ointment Commonly known as:  KENALOG 
  
valACYclovir 1 gram tablet Commonly known as:  VALTREX Take 1 Tab by mouth two (2) times a day.  
  
  
 
  
 
Ted Lundborg ENP-C,FNP-C

## 2018-10-23 LAB
GLUCOSE BLD STRIP.AUTO-MCNC: 100 MG/DL (ref 70–110)
GLUCOSE BLD STRIP.AUTO-MCNC: 106 MG/DL (ref 70–110)
GLUCOSE BLD STRIP.AUTO-MCNC: 146 MG/DL (ref 70–110)
GLUCOSE BLD STRIP.AUTO-MCNC: 76 MG/DL (ref 70–110)
GLUCOSE BLD STRIP.AUTO-MCNC: 81 MG/DL (ref 70–110)

## 2018-10-23 PROCEDURE — 74011250637 HC RX REV CODE- 250/637: Performed by: PSYCHIATRY & NEUROLOGY

## 2018-10-23 PROCEDURE — 82962 GLUCOSE BLOOD TEST: CPT

## 2018-10-23 PROCEDURE — 65220000003 HC RM SEMIPRIVATE PSYCH

## 2018-10-23 RX ORDER — MIRTAZAPINE 15 MG/1
7.5 TABLET, FILM COATED ORAL
Status: DISCONTINUED | OUTPATIENT
Start: 2018-10-23 | End: 2018-10-26 | Stop reason: HOSPADM

## 2018-10-23 RX ADMIN — MESALAMINE 800 MG: 400 CAPSULE, DELAYED RELEASE ORAL at 09:42

## 2018-10-23 RX ADMIN — ESTRADIOL 1 MG: 1 TABLET ORAL at 09:00

## 2018-10-23 RX ADMIN — ROSUVASTATIN CALCIUM 20 MG: 20 TABLET, FILM COATED ORAL at 21:08

## 2018-10-23 RX ADMIN — PANTOPRAZOLE SODIUM 40 MG: 40 TABLET, DELAYED RELEASE ORAL at 08:25

## 2018-10-23 RX ADMIN — SITAGLIPTIN 100 MG: 100 TABLET, FILM COATED ORAL at 08:25

## 2018-10-23 RX ADMIN — LOPERAMIDE HYDROCHLORIDE 2 MG: 2 CAPSULE ORAL at 21:55

## 2018-10-23 RX ADMIN — HYDRALAZINE HYDROCHLORIDE 25 MG: 25 TABLET, FILM COATED ORAL at 08:24

## 2018-10-23 RX ADMIN — BREXPIPRAZOLE 0.5 MG: 0.5 TABLET ORAL at 20:11

## 2018-10-23 RX ADMIN — MIRTAZAPINE 7.5 MG: 15 TABLET, FILM COATED ORAL at 21:08

## 2018-10-23 RX ADMIN — OXYBUTYNIN CHLORIDE 10 MG: 10 TABLET, FILM COATED, EXTENDED RELEASE ORAL at 08:24

## 2018-10-23 RX ADMIN — ONDANSETRON 4 MG: 4 TABLET, ORALLY DISINTEGRATING ORAL at 21:09

## 2018-10-23 RX ADMIN — MESALAMINE 800 MG: 400 CAPSULE, DELAYED RELEASE ORAL at 20:11

## 2018-10-23 RX ADMIN — HYDRALAZINE HYDROCHLORIDE 25 MG: 25 TABLET, FILM COATED ORAL at 20:09

## 2018-10-23 RX ADMIN — VALACYCLOVIR HYDROCHLORIDE 1000 MG: 500 TABLET, FILM COATED ORAL at 20:09

## 2018-10-23 RX ADMIN — MESALAMINE 800 MG: 400 CAPSULE, DELAYED RELEASE ORAL at 14:25

## 2018-10-23 RX ADMIN — RALOXIFENE HYDROCHLORIDE 60 MG: 60 TABLET, FILM COATED ORAL at 08:25

## 2018-10-23 RX ADMIN — VALACYCLOVIR HYDROCHLORIDE 1000 MG: 500 TABLET, FILM COATED ORAL at 08:24

## 2018-10-23 RX ADMIN — DOXAZOSIN 1 MG: 1 TABLET ORAL at 21:08
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
## 2018-10-23 NOTE — BH NOTES
Intrusive. Constant complaints. Entitled. Never satisfied with food or medicines. Eats and tolerates evening meal. Gripper socks and 15 minute checks in place for safety. Gait appropriate. Will continue to monitor and support.

## 2018-10-23 NOTE — BH NOTES
GROUP THERAPY PROGRESS NOTE Leslie Munoz is participating in [place]. Group time: 30 minutes Personal goal for participation: see the Dr 
 
Goal orientation: community Group therapy participation: minimal 
 
Therapeutic interventions reviewed and discussed: goals and procedures Impression of participation: encouraged

## 2018-10-23 NOTE — BSMART NOTE
OCCUPATIONAL THERAPY PROGRESS NOTE Group Time:  1430 Attendance: The patient attended full group. Participation: The patient participated with minimal elaboration in the activity. . 
Attention: The patient needed redirection to activity/topic at least once. Interaction: The patient frequently interacts with others. Initially irritable, did participate when asked. Unhappy with many things including the state of [place], presents as entitled.

## 2018-10-23 NOTE — BSMART NOTE
ART THERAPY GROUP PROGRESS NOTE PATIENT SCHEDULED FOR GROUP AT: 13:15 
 
ATTENDANCE: 3/4 PARTICIPATION LEVEL: Participates fully in the art process ATTENTION LEVEL: Able to focus on task FOCUS: Problem-solving SYMBOLIC & THEMATIC CONTENT AS NOTED IN IMAGERY: She was calm and compliant. She left to meet with her dietician for about 1/4 of group. She expressed concern regarding where her mother was, claiming she is her primary care taker, and that she plans on moving with her mother to [place] after she is discharged.

## 2018-10-23 NOTE — BSMART NOTE
SW Contact:  Most of initial session was elaboration of what pt shared in group tx with writer as well as what included in the crisis assessment. .. This include details about living in [place] x4 yrs with her & mom receiving continued extended services through special Programs for the homeless \"until you can get back on your feet\". On [facility] pt & mom have sought services & help for numerous medical issues that pt has, as well as living several places with each having there own elaborate circumstances. 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 Pt has tentative plan with her mom to take bus back to [facility] this weekend, where apartment waits & she can get better medical care. Other pressing issue is what will happen to her mom whose to be released from [facility] today. Pt admits still suicidal ideations due to being overwhelmed by all this stress. Case reviewed with  & Tx Team.   
 
Case also reviewed with [facility] . .. And our Emergency Department. . Regarding our support role for pt & some clarification on status of her mom being d/c from that department.

## 2018-10-23 NOTE — BH NOTES
GROUP THERAPY PROGRESS NOTE Len Benoit is participating in Relaxation Group time: 30 Minutes 
  
Personal goal for participation: Overall goal is to bring relaxation a purpose of improving a person's mental health. 
  
Goal orientation: Social 
  
Group therapy participation: Active 
  Therapeutic interventions reviewed and discussed: How relaxation is defined and how it can manage stress and anxiety. How relaxation reduces stress, plus the symptoms of mental health conditions, like depression,anxiety, and schizophrenia, and ways to include relaxation in a person's life. 
  
Impression of participation: Patient has fully participated.

## 2018-10-23 NOTE — PROGRESS NOTES
Problem: Suicide/Homicide (Adult/Pediatric) Goal: *STG: Attends activities and groups Patient to attend 2-3 group therapy every day while hospitalized. Outcome: Progressing Towards Goal 
Patient attends groups Problem: Diabetes Self-Management Goal: *Disease process and treatment process Patient teaching on Disease process and treatment every day while hospitalized. Outcome: Not Progressing Towards Goal 
Patient not willing to discuss diabetes management Comments: Patient irritable, demanding,and at times verbally abusive. Patient upset about her mother being sent to a shelter and blames ER staff. Patient apologizing to staff several times throughout the day due to her outbursts. Patient attends groups but displays difficulty focusing on the topic. Patient does not take accountability for her actions. Patient refuses medications until she feels her needs at the current moment are met, then will comply. Rounds maintained q 15 minutes. Staff will continue to provide a safe and supportive environment.

## 2018-10-23 NOTE — BSMART NOTE
SOCIAL WORK GROUP THERAPY PROGRESS NOTE Group Time:  10:15am      
 
Group Topic:  Coping Skills    C D Issues Group Participation:   
 
Pt moderately involved during group discussion but remained attentive. Discussion included the process of making \"Change\" by answering questions on handout with an emphasis on strengths & weaknesses to support improving one's self esteem. She did vent some about circumstances of her admission as well as how poorly situation handled in ED for her & her mom. She claims staff there assured her that while she was admitted here to [facility], her x78 / yr old mom was going to receive services from [facility] then she receives call from her mom that was at the [facility] in [place] & will have to vacate by 3pm today, with no place to go. \"I keep trying to get help for both of us but nothing changes\". Reassured her we would meet after group.

## 2018-10-23 NOTE — PROGRESS NOTES
NUTRITION Nursing Referral: [facility] Nutrition Consult: General Nutrition Management & Supplements RECOMMENDATIONS / PLAN:  
 
- Add nutritional supplements: Glucerna Shake BID  
- Update food preferences. - Remove lactose free diet restriction per pt request 
- Continue RD inpatient monitoring and evaluation. NUTRITION DIAGNOSIS & INTERVENTIONS:  
 
[x] Meals/snacks: modified composition 
[x] Medical food supplement therapy: initiate Nutrition Diagnosis:  Inadequate energy intake related to patient preferences and intolerance of some foods as evidenced by pt consuming <50% of current meals. ASSESSMENT:  
 
 Pt with fair meal intake due to preferences and intolerances, plan to update/modify. Pt reports hx of Pancreatitis, unable to verify per past medical hx. Pt consumes carnation instant breakfast with lactaid milk PTA, pt agreeable to Glucerna. Average intake adequate to meet patients estimated nutritional needs:   [] Yes     [] No      [x] Unable to determine at this time Diet: DIET REGULAR Lactose Free Food Allergies: NKFA Current Appetite:   [x] Good     [] Fair     [] Poor     [] Other: 
Appetite/meal intake prior to admission:   [x] Good     [] Fair     [] Poor     [] Other:  
Feeding Limitations:  [] Swallowing Difficulty       [] Chewing Difficulty       [x] Other: dislike of some foods Current Meal Intake: No data found. Gastrointestinal Issues:  [x] Yes - pt does not tolerate some foods   [] No  
Skin Integrity:  WDL Pertinent Medications:  Reviewed: SSI, imodium PRN, Eva Offer Labs:  Reviewed Anthropometrics: 
Ht Readings from Last 1 Encounters:  
10/17/18 5' 7\" (1.702 m) Last 3 Recorded Weights in this Encounter 10/21/18 1822 Weight: 78 kg (172 lb) Body mass index is 26.94 kg/m². Weight History: Pt states UBW of 185 lbs Weight Metrics 10/21/2018 10/17/2018 10/17/2018 10/11/2018 10/9/2018 6/14/2018 6/7/2018 Weight 172 lb 172 lb 172 lb 175 lb 14.8 oz 182 lb 182 lb 189 lb BMI 26.94 kg/m2 26.94 kg/m2 26.94 kg/m2 27.55 kg/m2 28.51 kg/m2 28.51 kg/m2 29.6 kg/m2 Admitting Diagnosis: Schizophrenia ([facility] Past Medical History:  
Diagnosis Date  Cryoglobulinemia ([facility]  Diabetes ([facility]  Hepatitis C   
 Hypertension  Schizophrenia ([facility] Education Needs:        [x] None identified  [] Identified - Not appropriate at this time  []  Identified and addressed - refer to education log Learning Limitations:   [] None identified  [x] Identified - mental illness Cultural, Jain & ethnic food preferences identified:  [x] None    [] Yes ESTIMATED NUTRITION NEEDS:  
 
5470-7791 kcal (MSJx1.2-1.3), 62-78 gm protein (0.8-1 gm/kg), 1 mL/kcal 
Based on: 78 kg       [x] Actual BW      [] IBW MONITORING & EVALUATION:  
 
Nutrition Goal(s): 1. Po intake of meals will meet >75% of patient estimated nutritional needs within the next 7 days. Outcome:   [] Met    []  Not Met   [x] New/Initial Goal 
 
Monitor:  [x] Food and beverage intake   [x] Diet order   [x] Nutrition-focused physical findings   [] Weight Previous Recommendations (for follow-up assessments only):     []   Implemented       []   Not Implemented (RD to address)   [] No Longer Appropriate   [] No Recommendation Made Discharge Planning: Low fat, diabetic diet [x]  Participated in care planning, discharge planning, & interdisciplinary rounds as appropriate Nadine Cavanaugh Dietetic Intern Pager: [phone]

## 2018-10-23 NOTE — BH NOTES
Patient BS 67. Patient provided snack to increase BS. Patient refused to eat entire snack. Patient BS increased to 76. Patient became argumentative wanting to discuss  transfer and refusing to eat. Patient wanted to know why this writer was focused on her blood sugar and not her  transfer. Patient provided lunch tray. Blood sugar 146.

## 2018-10-24 LAB
GLUCOSE BLD STRIP.AUTO-MCNC: 72 MG/DL (ref 70–110)
GLUCOSE BLD STRIP.AUTO-MCNC: 75 MG/DL (ref 70–110)
GLUCOSE BLD STRIP.AUTO-MCNC: 80 MG/DL (ref 70–110)
GLUCOSE BLD STRIP.AUTO-MCNC: 87 MG/DL (ref 70–110)

## 2018-10-24 PROCEDURE — 65220000003 HC RM SEMIPRIVATE PSYCH

## 2018-10-24 PROCEDURE — 82962 GLUCOSE BLOOD TEST: CPT

## 2018-10-24 PROCEDURE — 74011250637 HC RX REV CODE- 250/637: Performed by: PSYCHIATRY & NEUROLOGY

## 2018-10-24 RX ADMIN — MESALAMINE 800 MG: 400 CAPSULE, DELAYED RELEASE ORAL at 13:52

## 2018-10-24 RX ADMIN — HYDRALAZINE HYDROCHLORIDE 25 MG: 25 TABLET, FILM COATED ORAL at 08:22

## 2018-10-24 RX ADMIN — RALOXIFENE HYDROCHLORIDE 60 MG: 60 TABLET, FILM COATED ORAL at 08:22

## 2018-10-24 RX ADMIN — VALACYCLOVIR HYDROCHLORIDE 1000 MG: 500 TABLET, FILM COATED ORAL at 21:06

## 2018-10-24 RX ADMIN — MESALAMINE 800 MG: 400 CAPSULE, DELAYED RELEASE ORAL at 10:10

## 2018-10-24 RX ADMIN — ESTRADIOL 1 MG: 1 TABLET ORAL at 10:10

## 2018-10-24 RX ADMIN — MIRTAZAPINE 7.5 MG: 15 TABLET, FILM COATED ORAL at 21:06

## 2018-10-24 RX ADMIN — HYDRALAZINE HYDROCHLORIDE 25 MG: 25 TABLET, FILM COATED ORAL at 21:06

## 2018-10-24 RX ADMIN — VALACYCLOVIR HYDROCHLORIDE 1000 MG: 500 TABLET, FILM COATED ORAL at 08:22

## 2018-10-24 RX ADMIN — MESALAMINE 800 MG: 400 CAPSULE, DELAYED RELEASE ORAL at 21:06

## 2018-10-24 RX ADMIN — BREXPIPRAZOLE 1 MG: 0.5 TABLET ORAL at 12:00

## 2018-10-24 RX ADMIN — OXYBUTYNIN CHLORIDE 10 MG: 10 TABLET, FILM COATED, EXTENDED RELEASE ORAL at 08:22

## 2018-10-24 RX ADMIN — SITAGLIPTIN 100 MG: 100 TABLET, FILM COATED ORAL at 08:22

## 2018-10-24 RX ADMIN — ONDANSETRON 4 MG: 4 TABLET, ORALLY DISINTEGRATING ORAL at 12:25

## 2018-10-24 RX ADMIN — LOPERAMIDE HYDROCHLORIDE 2 MG: 2 CAPSULE ORAL at 12:25

## 2018-10-24 RX ADMIN — DOXAZOSIN 1 MG: 1 TABLET ORAL at 21:06

## 2018-10-24 RX ADMIN — ROSUVASTATIN CALCIUM 20 MG: 20 TABLET, FILM COATED ORAL at 21:06

## 2018-10-24 RX ADMIN — PANTOPRAZOLE SODIUM 40 MG: 40 TABLET, DELAYED RELEASE ORAL at 06:53

## 2018-10-24 NOTE — PROGRESS NOTES
Pt was seen individually, case was discussed with staff. Chart was also reviewed. Describing self as being rather irritable, tx with cariprazine was not available. Hence  tx with Rexulti was ordered. I was informed by Pharmacy, that they have it available, however it was not started yesterday(?). Otherwise, she continues to describe the presence of AH, and even though they are somewhat better, she describes self as not being able to be discharged yet. So a new request was entered. Otherwise, we are appreciative for the help provided by the Dietitian consultant. The patient however does not feel that the diet they ordered is appropriate for her. Will request for them to come back again. Will see the patient again tomorrow.

## 2018-10-24 NOTE — BSMART NOTE
Pt.'s case was discussed  treatment team this a.m. 
 
YUKO Contact:    YUKO discussed d/c planning with pt. The pt. Stated she plans to leave the area on 10/27/18 with her mother. Pt. plans to catch bus to [place]. Pt. Denies ideations and hallucinations. Pt. admits she has been feeling tiwari. Pt. expressed feeling irritable at times than calm. SW discussed positive coping skills and continued safety plan. YUKO followed up with [employer] regarding pt.'s mother where about's. YUKO talked to Mrs. Humphrey Sacks staff over the [employer]. Mrs. Humphrey Sacks stated the pt's mother is still at the shelter. Mrs. Humphrey Sacks stated she has kept the pt informed about her mother. Lisset Bettencourt stated the pt. Has also talked to the mother. YUKO provided pt. With update.

## 2018-10-24 NOTE — PROGRESS NOTES
NUTRITION Nursing Referral: [facility] Nutrition Consult: General Nutrition Management & Supplements RECOMMENDATIONS / PLAN:  
 
- Continue current nutrition interventions. - Provided list of food preferences to [facility] today. - Continue RD inpatient monitoring and evaluation. NUTRITION DIAGNOSIS & INTERVENTIONS:  
 
[x] Meals/snacks: modified composition 
[x] Medical food supplement therapy: Glucerna Shake, BID [x] Collaboration and referral of nutrition care: Discussed food preferences with CA. Nutrition Diagnosis:  Inadequate energy intake related to patient preferences and intolerance of some foods as evidenced by pt consuming <50% of current meals. ASSESSMENT:  
 
10/24: Discussed food preferences again with pt today. Pt provided a list of foods she likes and dislikes, provided this list to the . 10/23: Pt with fair meal intake due to preferences and intolerances, plan to update/modify. Pt reports hx of Pancreatitis, unable to verify per past medical hx. Pt consumes carnation instant breakfast with lactaid milk PTA, pt agreeable to Glucerna. Average intake adequate to meet patients estimated nutritional needs:   [] Yes     [] No      [x] Unable to determine at this time Diet: DIET NUTRITIONAL SUPPLEMENTS Breakfast, Dinner; [address] [place] [address] Food Allergies: NKFA Current Appetite:   [x] Good     [] Fair     [] Poor     [] Other: 
Appetite/meal intake prior to admission:   [x] Good     [] Fair     [] Poor     [] Other:  
Feeding Limitations:  [] Swallowing Difficulty       [] Chewing Difficulty       [x] Other: dislike, report of intolerance of some foods Current Meal Intake: No data found. Gastrointestinal Issues:  [x] Yes - pt does not tolerate some foods per pt report   [] No  
Skin Integrity:  WDL Pertinent Medications:  Reviewed: SSI, remeron, imodium PRN, zofran, [place] Labs:  Reviewed Anthropometrics: Ht Readings from Last 1 Encounters:  
10/17/18 5' 7\" (1.702 m) Last 3 Recorded Weights in this Encounter 10/21/18 1822 Weight: 78 kg (172 lb) Body mass index is 26.94 kg/m². Weight History: Pt states UBW of 185 lbs Weight Metrics 10/21/2018 10/17/2018 10/17/2018 10/11/2018 10/9/2018 6/14/2018 6/7/2018 Weight 172 lb 172 lb 172 lb 175 lb 14.8 oz 182 lb 182 lb 189 lb BMI 26.94 kg/m2 26.94 kg/m2 26.94 kg/m2 27.55 kg/m2 28.51 kg/m2 28.51 kg/m2 29.6 kg/m2 Admitting Diagnosis: Schizophrenia ([facility] Past Medical History:  
Diagnosis Date  Cryoglobulinemia ([facility]  Diabetes ([facility]  Hepatitis C   
 Hypertension  Schizophrenia ([facility] Education Needs:        [x] None identified  [] Identified - Not appropriate at this time  []  Identified and addressed - refer to education log Learning Limitations:   [] None identified  [x] Identified - mental illness Cultural, Sikhism & ethnic food preferences identified:  [x] None    [] Yes ESTIMATED NUTRITION NEEDS:  
 
6784-3803 kcal (MSJx1.2-1.3), 62-78 gm protein (0.8-1 gm/kg), 1 mL/kcal 
Based on: 78 kg       [x] Actual BW      [] IBW MONITORING & EVALUATION:  
 
Nutrition Goal(s): 1. Po intake of meals will meet >75% of patient estimated nutritional needs within the next 7 days. Outcome:   [] Met    [x]  Not Met/Progressing   [] New/Initial Goal 
 
Monitor:  [x] Food and beverage intake   [x] Diet order   [x] Nutrition-focused physical findings   [] Weight Previous Recommendations (for follow-up assessments only):     [x]   Implemented       []   Not Implemented (RD to address)   [] No Longer Appropriate   [] No Recommendation Made Discharge Planning: Low fat, diabetic diet [x]  Participated in care planning, discharge planning, & interdisciplinary rounds as appropriate Randy Blank RD Pager: [phone]

## 2018-10-24 NOTE — BH NOTES
GROUP THERAPY PROGRESS NOTE Filiberto Myrick is participating in Leisure-Creative Group. Group time: 30 minutes Personal goal for participation: Reflection Goal orientation: relaxation Group therapy participation: minimal 
 
Therapeutic interventions reviewed and discussed: Reflection on the educational program on television. Impression of participation: Patient was quiet throughout the group, but listened to others.

## 2018-10-24 NOTE — BH NOTES
Treatment team [facility] - Medical Director: __x___present Psychiatrist: __x___present Charge nurse: _x____present MSW: _x____present : _____present Nurse Manager: _____present Student RNs: _____present Medical Students: _____present Art Therapist: _x____present Clinical Coordinator: _x____present Occupational Therapist: _x____present : _______ present UR  ___x____ present Crisis Supervisor___x____present Plan of care discussed and updated as appropriate.

## 2018-10-24 NOTE — BH NOTES
The patient has been monitored for safety today. Mood continues to be demanding, intrusive and rude. Pt refused to have her vitals checked before receiving her medications. Socialized with her peers. Talked with her Dr and . Pt continues to complained about everything from groups, meals, the facility, and her medications. Staff will continue to encouraged the pt to focus on her treatment and continue to monitor her for safety and locations.

## 2018-10-24 NOTE — BH NOTES
Patient attended group today, she was loud and disruptive during dinner, after consuming 95% of her dinner she  complained that she did not like her food , staff counseled and redirected her. She took her medication staff will continue to monitor patient for safety.

## 2018-10-24 NOTE — PROGRESS NOTES
conducted Spirituality Group for Daryn Morel, who is a 59 y.o.,female. Patients Primary Language is: [place]. According to the patients EMR Latter-day Affiliation is: No preference. The reason the Patient came to the hospital is:  
Patient Active Problem List  
 Diagnosis Date Noted  Herpes genitalis in women 07/26/2018  Breast cyst, left 06/07/2018  Bipolar disorder ([facility] 06/07/2018  Ulcerative colitis ([facility] 06/07/2018  Screening for breast cancer 06/07/2018  Grade II hemorrhoids 06/07/2018  Essential hypertension 06/07/2018  Chronic hepatitis C ([facility] 08/13/2014  [facility]ia ([facility] 08/13/2014  Cryoglobulinemia ([facility] 08/13/2014  Endometriosis 08/13/2014  Diabetes mellitus ([facility] 08/13/2014  Hypercholesterolemia 08/13/2014  S/P MIKE (total abdominal hysterectomy) 08/13/2014 The  provided the following Interventions: 
Continued the relationship of care and support. Listened empathically. Offered prayer and assurance of continued prayer on patients behalf. Chart reviewed. The following outcomes were achieved: 
Patient expressed gratitude for 's visit. Assessment: 
There are no further spiritual or Yarsani issues which require Spiritual Care Services interventions at this time. Plan: 
Chaplains will continue to follow and will provide pastoral care on an as needed/requested basis.  recommends bedside caregivers page  on duty if patient shows signs of acute spiritual or emotional distress. Chaplain Juhi Farnsworth Spiritual [facility]  
[phone]

## 2018-10-24 NOTE — BH NOTES
GROUP THERAPY PROGRESS NOTE Leonie Santos is participating in [place]. Group time: 30 minutes Personal goal for participation: none Goal orientation: community Group therapy participation: minimal 
 
Therapeutic interventions reviewed and discussed: goals and procedures Impression of participation: encouraged

## 2018-10-24 NOTE — BH NOTES
Patient focused on meal complaints and male MHT. Patient reports she is still eating her lunch, then complained that the MHT did not remove her tray when he took everyone else's. Patient continues to be intrusive and demanding. Patient becomes upset when limits are set then starts complaining about staff member setting limits. Patient threatens to jerica this facility, ER staff, the dietician, cafeteria workers, and unit staff.

## 2018-10-24 NOTE — BSMART NOTE
OCCUPATIONAL THERAPY PROGRESS NOTE Group Time:  2922 Attendance: The patient attended 3/4 of group. Participation: The patient refused to participate in the activity. Rude and irritable.

## 2018-10-24 NOTE — BH NOTES
GROUP THERAPY PROGRESS NOTE Marvin Valenzuela is participating in Leisure-Creative Group 
  
Group time: 15 Minutes 
  
Personal goal for participation: To Find Quality Time As A Free Time And Being Creative. 
  
Goal orientation: Social 
  
Group therapy participation: Active 
  Therapeutic interventions reviewed and discussed: The Importance of Having A Leisure Creativity and The Quality of Spending Free Time As A Group Participating In Different Activities Or Just An Individual (a person) Relaxing And Enjoying Leisure. 
  
Impression of participation: Patient Has Fully Participated

## 2018-10-24 NOTE — PROGRESS NOTES
Problem: Suicide/Homicide (Adult/Pediatric) Goal: *STG: Attends activities and groups Patient to attend 2-3 group therapy every day while hospitalized. Outcome: Progressing Towards Goal 
Pt will attend at least 2-3 group therapy sessions daily during this hospitalization. Problem: Diabetes Self-Management Goal: *Disease process and treatment process Patient teaching on Disease process and treatment every day while hospitalized. Outcome: Progressing Towards Goal 
Pt will be able to verbalize target BG daily and treatments for hyperglycemia and hypoglycemia daily during this hospitalization. Problem: Depressed Mood (Adult/Pediatric) Goal: *STG: Participates in treatment plan Patient to participate in treatment plan every day while hospitalized. Outcome: Progressing Towards Goal 
Pt will participate in treatment plan daily during this hospital admission. Comments: Pt sitting quietly at the table. Appears to be anxious. Pt approached nursing station requesting personal belongings to include purse and cell phone in order to set up arrangements and/or reservations plans for upcoming discharge. Pt will be obtaining a bus ticket 10/27 for an upcoming trip to [place]. Pt pleasantly interacting with staff and other patients in the day room. Also voiced concerns about dietary restrictions dietary came up to speak with patient up plans regarding upcoming menu items, choices, and availability. Pt very cooperative and satisfied with the choices. Will attend recreation therapy this evening excited and pleased about going outside. Denies any complaints/concerns voiced also denies any SI/HI and/or auditory hallucinations at this time. Pt also received a call from [employer] and was very pleased with the conversation and future plans regarding treatment. Will continue to Monitor.

## 2018-10-25 LAB
GLUCOSE BLD STRIP.AUTO-MCNC: 75 MG/DL (ref 70–110)
GLUCOSE BLD STRIP.AUTO-MCNC: 83 MG/DL (ref 70–110)
GLUCOSE BLD STRIP.AUTO-MCNC: 87 MG/DL (ref 70–110)

## 2018-10-25 PROCEDURE — 82962 GLUCOSE BLOOD TEST: CPT

## 2018-10-25 PROCEDURE — 65220000003 HC RM SEMIPRIVATE PSYCH

## 2018-10-25 PROCEDURE — 74011250637 HC RX REV CODE- 250/637: Performed by: PSYCHIATRY & NEUROLOGY

## 2018-10-25 RX ADMIN — ONDANSETRON 4 MG: 4 TABLET, ORALLY DISINTEGRATING ORAL at 08:32

## 2018-10-25 RX ADMIN — ESTRADIOL 1 MG: 1 TABLET ORAL at 09:24

## 2018-10-25 RX ADMIN — MESALAMINE 800 MG: 400 CAPSULE, DELAYED RELEASE ORAL at 14:20

## 2018-10-25 RX ADMIN — PANTOPRAZOLE SODIUM 40 MG: 40 TABLET, DELAYED RELEASE ORAL at 08:29

## 2018-10-25 RX ADMIN — MESALAMINE 800 MG: 400 CAPSULE, DELAYED RELEASE ORAL at 20:11

## 2018-10-25 RX ADMIN — VALACYCLOVIR HYDROCHLORIDE 1000 MG: 500 TABLET, FILM COATED ORAL at 20:11

## 2018-10-25 RX ADMIN — VALACYCLOVIR HYDROCHLORIDE 1000 MG: 500 TABLET, FILM COATED ORAL at 08:29

## 2018-10-25 RX ADMIN — SITAGLIPTIN 100 MG: 100 TABLET, FILM COATED ORAL at 08:29

## 2018-10-25 RX ADMIN — HYDRALAZINE HYDROCHLORIDE 25 MG: 25 TABLET, FILM COATED ORAL at 20:11

## 2018-10-25 RX ADMIN — LOPERAMIDE HYDROCHLORIDE 2 MG: 2 CAPSULE ORAL at 08:32

## 2018-10-25 RX ADMIN — LOPERAMIDE HYDROCHLORIDE 2 MG: 2 CAPSULE ORAL at 18:11

## 2018-10-25 RX ADMIN — LORAZEPAM 1 MG: 1 TABLET ORAL at 02:34

## 2018-10-25 RX ADMIN — ROSUVASTATIN CALCIUM 20 MG: 20 TABLET, FILM COATED ORAL at 21:10

## 2018-10-25 RX ADMIN — DOXAZOSIN 1 MG: 1 TABLET ORAL at 21:08

## 2018-10-25 RX ADMIN — RALOXIFENE HYDROCHLORIDE 60 MG: 60 TABLET, FILM COATED ORAL at 09:00

## 2018-10-25 RX ADMIN — MESALAMINE 800 MG: 400 CAPSULE, DELAYED RELEASE ORAL at 09:24

## 2018-10-25 RX ADMIN — MIRTAZAPINE 7.5 MG: 15 TABLET, FILM COATED ORAL at 21:08

## 2018-10-25 RX ADMIN — OXYBUTYNIN CHLORIDE 10 MG: 10 TABLET, FILM COATED, EXTENDED RELEASE ORAL at 08:29

## 2018-10-25 RX ADMIN — HYDRALAZINE HYDROCHLORIDE 25 MG: 25 TABLET, FILM COATED ORAL at 08:29

## 2018-10-25 RX ADMIN — BREXPIPRAZOLE 1 MG: 0.5 TABLET ORAL at 09:00

## 2018-10-25 RX ADMIN — ONDANSETRON 4 MG: 4 TABLET, ORALLY DISINTEGRATING ORAL at 18:11

## 2018-10-25 NOTE — BSMART NOTE
OCCUPATIONAL THERAPY PROGRESS NOTE Group Time:  1387 Attendance: The patient attended full group. Participation: The patient participated fully in the activity. Mahin Quinn Attention: The patient was able to focus on the activity. Interaction: The patient frequently interacts with others. Said she had been trying to find a hotel room , but many were booked up due to this weekend being homecoming for local colleges. Stated she planned to go to [place] on Oct. 30-31.

## 2018-10-25 NOTE — PROGRESS NOTES
Problem: Suicide/Homicide (Adult/Pediatric) Goal: *STG: Remains safe in hospital 
Patient to remain safe every day while hospitalized. Outcome: Progressing Towards Goal 
Patient denies thoughts of self harm Problem: Nutrition Deficit Goal: *Optimize nutritional status Po intake of meals will meet >75% of patient estimated nutritional needs within the next 7 days. Outcome: Progressing Towards Goal 
Patient eating meals provided Comments: Patient making plans for discharge. Patient assisted by staff to obtain phone numbers to hotels, train , and bus stations. Patient mood pleasant and cooperative. Patient reports she left her cell phone in the ER at [facility] and has contacted the facility to coordinate retrieval. Patient advised administration contacted and she will be contacted back regarding her phone. Nurse Manager advised of situation. Patient became upset after speaking with  about discharge transportation. Patient now advises she will not be able to leave on Saturday but will need to leave Sunday due to hotel prices related to homecoming weekend. Cafeteria consulted prior to each meal. Patient meal and mediation compliant. Rounds maintained q 15 minutes. Staff will continue to provide a safe and supportive environment.

## 2018-10-25 NOTE — BSMART NOTE
SW Contact:  Reconnected with pt from having yesterday off with WILMER Broussard having met with pt. As we we started to discuss tentative d/c plans,pt presented a [address] as has been discussed last several days. She started by showing list of hotels in [facility] she wanted to stay at starting Sunday but most all full due to [employer]. Homecoming weekend. There was a room available but \"too expensive, more than I could afford\". Writer reminded pt there was still tentative plan to be d/c probably Saturday & possibly as early as Friday. .. Pt immediately defensive stating \" that won't work, I have to stay until Sunday\". Also asked her plan to get there & why since writer still under assumption she was going to take Bus from this area to [facility]. Pt spoke saying  \"changed my plans we're (her & mom) taking the train, can't afford no plane\". When asked how she may get there since she only has medicare, pt placed that responsibility on S GABBY Jyoti. \"whose going to make those arrangements\". Reminded pt I was surprised that's exactly was what said & I'd talk with Misa Sandoval but [facility] is probably one of the few options. Pt more defensive & somewhat argumentative, insisting Wu Olvera would do that & is a better person to help her. .. Pt ALSO EXPECTED [address] TO ASSIST GETTING HER MOM TO [place] & BECAME MORE IRRITABLE when writer clarified [facility]. Pt at that point told writer, \"your not my , leave me alone, you don't do anything to help people\". Reminded pt i'd look into her concerns , as she left table & went to speak to nursing staff \"I'm done with that knucklehead\". As she vented her frustrations to T, writer stood by to clarify her comments. Also reminded pt I would take her concerns to [address], which was done. We all agreed to discuss in detail in tomorrow's staffing.

## 2018-10-25 NOTE — PROGRESS NOTES
Ms. Shelly Avendano was seen individually, case was discussed with staff, chart has been reviewed. I am very appreciative of the written note by Mr. Melissa Mayer, attention invited to his emmanuel. cription of the patient \"current plans after discharge. \" In that respect, we are planning on discharging the patient on Saturday, not Sunday, with the intention for her to go to [facility] shortly there after. I will be providing the patient with prescriptions for 30 days of all the medications that she is currently being prescribed for. This are the plans that the patient and the undersigned discuss with great deal of detail earlier today. ..and we are planning to follow through with them.  I will see her early tomorrow AM

## 2018-10-25 NOTE — BH NOTES
Pt requested and received ativan 1 mg. Po. ( @ 02:34 ). She responded to teaching about medication and anxiety. She returned to sleep within 35 minutes.

## 2018-10-25 NOTE — BSMART NOTE
ART THERAPY GROUP PROGRESS NOTE PATIENT SCHEDULED FOR GROUP AT: 6079 ATTENDANCE: 1/2 PARTICIPATION LEVEL: Does not engage in the art process or gives up easily. ATTENTION LEVEL: Unable to attend to task at hand. FOCUS: Goals SYMBOLIC & THEMATIC CONTENT AS NOTED IN IMAGERY: She was present the first half of group and was called out to meet with her SW. She became agitated after meeting with her SW and refused to return to group.

## 2018-10-25 NOTE — BH NOTES
GROUP THERAPY PROGRESS NOTE Rossana Ely is participating in [place]. Group time: 30 minutes Personal goal for participation: talk to my Dr 
 
Goal orientation: community Group therapy participation: active Therapeutic interventions reviewed and discussed: goals and procedures Impression of participation: encouraged

## 2018-10-26 VITALS
HEART RATE: 85 BPM | WEIGHT: 172 LBS | SYSTOLIC BLOOD PRESSURE: 121 MMHG | BODY MASS INDEX: 26.94 KG/M2 | TEMPERATURE: 98.6 F | DIASTOLIC BLOOD PRESSURE: 79 MMHG | OXYGEN SATURATION: 100 % | RESPIRATION RATE: 20 BRPM

## 2018-10-26 LAB
GLUCOSE BLD STRIP.AUTO-MCNC: 81 MG/DL (ref 70–110)
GLUCOSE BLD STRIP.AUTO-MCNC: 91 MG/DL (ref 70–110)

## 2018-10-26 PROCEDURE — 82962 GLUCOSE BLOOD TEST: CPT

## 2018-10-26 PROCEDURE — 74011250637 HC RX REV CODE- 250/637: Performed by: PSYCHIATRY & NEUROLOGY

## 2018-10-26 RX ORDER — HYDRALAZINE HYDROCHLORIDE 25 MG/1
25 TABLET, FILM COATED ORAL 2 TIMES DAILY
Qty: 60 TAB | Refills: 0 | Status: SHIPPED | OUTPATIENT
Start: 2018-10-26

## 2018-10-26 RX ORDER — PANTOPRAZOLE SODIUM 40 MG/1
40 TABLET, DELAYED RELEASE ORAL
Qty: 30 TAB | Refills: 0 | Status: SHIPPED | OUTPATIENT
Start: 2018-10-27

## 2018-10-26 RX ORDER — LOPERAMIDE HYDROCHLORIDE 2 MG/1
2 CAPSULE ORAL
Qty: 20 CAP | Refills: 0 | Status: SHIPPED | OUTPATIENT
Start: 2018-10-26 | End: 2018-11-05

## 2018-10-26 RX ORDER — FENOFIBRATE 48 MG/1
48 TABLET, COATED ORAL DAILY
Qty: 30 TAB | Refills: 0 | Status: SHIPPED | OUTPATIENT
Start: 2018-10-26

## 2018-10-26 RX ORDER — RALOXIFENE HYDROCHLORIDE 60 MG/1
60 TABLET, FILM COATED ORAL DAILY
Qty: 30 TAB | Refills: 0 | Status: SHIPPED | OUTPATIENT
Start: 2018-10-27

## 2018-10-26 RX ORDER — DOXAZOSIN 1 MG/1
1 TABLET ORAL
Qty: 30 TAB | Refills: 0 | Status: SHIPPED | OUTPATIENT
Start: 2018-10-26

## 2018-10-26 RX ORDER — VALACYCLOVIR HYDROCHLORIDE 500 MG/1
1000 TABLET, FILM COATED ORAL 2 TIMES DAILY
Qty: 120 TAB | Refills: 0 | Status: SHIPPED | OUTPATIENT
Start: 2018-10-26

## 2018-10-26 RX ORDER — MESALAMINE 400 MG/1
800 CAPSULE, DELAYED RELEASE ORAL 3 TIMES DAILY
Qty: 180 CAP | Refills: 0 | Status: SHIPPED | OUTPATIENT
Start: 2018-10-26

## 2018-10-26 RX ORDER — MIRTAZAPINE 7.5 MG/1
7.5 TABLET, FILM COATED ORAL
Qty: 30 TAB | Refills: 0 | Status: SHIPPED | OUTPATIENT
Start: 2018-10-26

## 2018-10-26 RX ORDER — ROSUVASTATIN CALCIUM 20 MG/1
20 TABLET, COATED ORAL
Qty: 30 TAB | Refills: 0 | Status: SHIPPED | OUTPATIENT
Start: 2018-10-26

## 2018-10-26 RX ORDER — OXYBUTYNIN CHLORIDE 10 MG/1
10 TABLET, EXTENDED RELEASE ORAL DAILY
Qty: 30 TAB | Refills: 0 | Status: SHIPPED | OUTPATIENT
Start: 2018-10-27

## 2018-10-26 RX ORDER — ESTRADIOL 1 MG/1
1 TABLET ORAL DAILY
Qty: 30 TAB | Refills: 0 | Status: SHIPPED | OUTPATIENT
Start: 2018-10-27

## 2018-10-26 RX ORDER — METOPROLOL TARTRATE 25 MG/1
25 TABLET, FILM COATED ORAL DAILY
Qty: 30 TAB | Refills: 0 | Status: SHIPPED | OUTPATIENT
Start: 2018-10-26

## 2018-10-26 RX ORDER — ONDANSETRON 4 MG/1
4 TABLET, ORALLY DISINTEGRATING ORAL
Qty: 20 TAB | Refills: 0 | Status: SHIPPED | OUTPATIENT
Start: 2018-10-26

## 2018-10-26 RX ADMIN — HYDRALAZINE HYDROCHLORIDE 25 MG: 25 TABLET, FILM COATED ORAL at 08:47

## 2018-10-26 RX ADMIN — ONDANSETRON 4 MG: 4 TABLET, ORALLY DISINTEGRATING ORAL at 09:38

## 2018-10-26 RX ADMIN — VALACYCLOVIR HYDROCHLORIDE 1000 MG: 500 TABLET, FILM COATED ORAL at 08:47

## 2018-10-26 RX ADMIN — LORAZEPAM 2 MG: 1 TABLET ORAL at 03:57

## 2018-10-26 RX ADMIN — OXYBUTYNIN CHLORIDE 10 MG: 10 TABLET, FILM COATED, EXTENDED RELEASE ORAL at 08:46

## 2018-10-26 RX ADMIN — MESALAMINE 800 MG: 400 CAPSULE, DELAYED RELEASE ORAL at 08:46

## 2018-10-26 RX ADMIN — LOPERAMIDE HYDROCHLORIDE 2 MG: 2 CAPSULE ORAL at 09:38

## 2018-10-26 RX ADMIN — PANTOPRAZOLE SODIUM 40 MG: 40 TABLET, DELAYED RELEASE ORAL at 06:59

## 2018-10-26 RX ADMIN — RALOXIFENE HYDROCHLORIDE 60 MG: 60 TABLET, FILM COATED ORAL at 08:47

## 2018-10-26 RX ADMIN — SITAGLIPTIN 100 MG: 100 TABLET, FILM COATED ORAL at 08:47

## 2018-10-26 RX ADMIN — MESALAMINE 800 MG: 400 CAPSULE, DELAYED RELEASE ORAL at 13:18

## 2018-10-26 RX ADMIN — LORAZEPAM 1 MG: 1 TABLET ORAL at 11:08

## 2018-10-26 RX ADMIN — ESTRADIOL 1 MG: 1 TABLET ORAL at 08:46

## 2018-10-26 NOTE — BH NOTES
[id number] patient requested and received ativan 2 mg PO for \"feeling real anxious\". Patient went back to bed after taking medication. Medication effective; patient went back to sleep. Patient very cooperative when she got a roommate. Polite and appropriate with staff. Patient asking to be transferred to the Adult unit during the day if the doctor can transfer her there.

## 2018-10-26 NOTE — BH NOTES
CHRISTY Note:S/O- The above pt has been on the phone with her  discussing information in reference to her discharge and after the call ended the above pt verbalized \"That old white kasi\" (which she was referring to her  after she hung up the phone with him at this time. She has still been very argument yeyo, rude, and disrespectful  towards staff the entire shift. She has been visible on the phone calling people on phone and has required re-direction due to her language and yelling while on the phone. She was educated about the phone rules and protocol while on the unit. She verbalized to staff \"I am calling who I want to call and my phone calls are important\" she still continue to be very argument yeyo towards staff(person writing).

## 2018-10-26 NOTE — BSMART NOTE
SW Contact:  Reviewed d/c & safety plan (see fyi). .. Discussed her temporarily staying in [place] [facility] [place] with plan to relocate in [place] next week. Pt also given #'s & location for [address] [place] [address]. .. As well as Crisis Hotline #'s (see fyi)

## 2018-10-26 NOTE — PROGRESS NOTES
Problem: Suicide/Homicide (Adult/Pediatric) Goal: *STG: Remains safe in hospital 
Patient to remain safe every day while hospitalized. Outcome: Progressing Towards Goal 
Patient remains safe on unit Problem: Depressed Mood (Adult/Pediatric) Goal: *STG: Participates in treatment plan Patient to participate in treatment plan every day while hospitalized. Outcome: Progressing Towards Goal 
Patient actively planning her discharge Comments: Patient loud, angry, agitated, and yelling at every meal. Patient reports she is not being served the right foods. Patient previously wrote out a list of foods that she does eat and the list currently is with dietary staff. Spoke with dietary staff Dilia Mckinney who advised patient refuses to fill out a menu and they are sending the food on her list. She advised the patient continues to call down and request specific foods on her list and not what is sent. Spoke with nurse manager and due to the size of the facility, patient will be provided a meal from the food list she provided without additional meals being sent over. Patient overheard stating she cannot be discharged on the bus and if she is sent on the bus she plans to fall and jerica the hospital. Patient very intrusive with peers and observed coaching peers how to make complaints. Patient does not voice SI and is not observed displaying any self injurious behaviors. Rounds maintained q 15 minutes. Staff will continue to provide a safe and supportive environment.

## 2018-10-26 NOTE — PROGRESS NOTES
GROUP THERAPY PROGRESS NOTE Rossana Ely is participating in recreational  
 
Group time: 30 minutes Personal goal for participation: relaxation Goal orientation: social 
 
Group therapy participation: active Therapeutic interventions reviewed and discussed:Patient was encouraged by staff Impression of participation: Patient played corn Song

## 2018-10-26 NOTE — BH NOTES
GROUP THERAPY PROGRESS NOTE Kennedi Keating is participating in [place]. Group time: 45 minutes Personal goal for participation: rules/ regulations Goal orientation: community Group therapy participation: disruptive Therapeutic interventions reviewed and discussed: She refused group. Impression of participation: The above did not participate in group however the above pt was very disruptive and talkative while group was in session.

## 2018-10-26 NOTE — BH NOTES
Patient demanding to leave. Patient yelling she wants to be discharge now. Patient advised of AMA process. Patient yelling, calling this writer a \"Bitch\" multiple times and demanding the doctor be called so she can leave. Physician paged. While awaiting call back patient requested medication scripts. Discussed with Dr Yessenia Appiah. Advised patient if she wanted to leave now she could be discharged AMA or if she wanted scripts she could wait for physician who said he was on the way. Patient advised she would wait for the physician.

## 2018-10-26 NOTE — PROGRESS NOTES
Patient attended group , she ate all meals and and took her medication, 
 staff will continue to monitor patient for safety.

## 2018-10-26 NOTE — DISCHARGE INSTRUCTIONS
BEHAVIORAL HEALTH NURSING DISCHARGE NOTE      The following personal items collected during your admission are returned to you:   Dental Appliance: Dental Appliances: None  Vision: Visual Aid: None  Hearing Aid:    Jewelry:    Clothing:    Other Valuables:    Valuables sent to safe:        PATIENT INSTRUCTIONS:        The discharge information has been reviewed with the patient. The patient verbalized understanding. Patient armband removed and shredded      ***IMPORTANT NUMBERS***        [facility]        [phone] [address] [facility]       [phone]    Suicide Prevention     [phone]       Bipolar Disorder: Care Instructions  Your Care Instructions    Bipolar disorder is an illness that causes extreme mood changes, from times of very high energy (manic episodes) to times of depression. But many people with bipolar disorder show only the symptoms of depression. These moods may cause problems with your work, school, family life, friendships, and how well you function. This disease is also called manic-depression. There is no cure for bipolar disorder, but it can be helped with medicines. Counseling may also help. It is important to take your medicines exactly as prescribed, even when you feel well. You may need lifelong treatment. Follow-up care is a key part of your treatment and safety. Be sure to make and go to all appointments, and call your doctor if you are having problems. It's also a good idea to know your test results and keep a list of the medicines you take. How can you care for yourself at home? · Be safe with medicines. Take your medicines exactly as prescribed. Do not stop or change a medicine without talking to your doctor first. Lisette Ziegler and your doctor may need to try different combinations of medicines to find what works for you. · Take your medicines on schedule to keep your moods even.  When you feel good, you may think that you do not need your medicines. But it is important to keep taking them. · Go to your counseling sessions. Call and talk with your counselor if you can't go to a session or if you don't think the sessions are helping. Do not just stop going. · Get at least 30 minutes of activity on most days of the week. Walking is a good choice. You also may want to do other things, such as running, swimming, or cycling. · Get enough sleep. Keep your room dark and quiet. Try to go to bed at the same time every night. · Eat a healthy diet. This includes whole grains, dairy, fruits, vegetables, and protein. Eat foods from each of these groups. · Try to lower your stress. Manage your time, build a strong support system, and lead a healthy lifestyle. To lower your stress, try physical activity, slow deep breathing, or getting a massage. · Do not use alcohol or illegal drugs. · Learn the early signs of your mood changes. You can then take steps to help yourself feel better. · Ask for help from friends and family when you need it. You may need help with daily chores when you are depressed. When you are manic, you may need support to control your high energy levels. What should you do if someone in your family has bipolar disorder? · Learn about the disease and the signs that it is getting worse. · Remind your family member that you love him or her. · Make a plan with all family members about how to take care of your loved one when his or her symptoms are bad. · Talk about your fears and concerns and those of other family members. Seek counseling if needed. · Do not focus attention only on the person who is in treatment. · Remind yourself that it will take time for changes to occur. · Do not blame yourself for the disease. · Know your legal rights and the legal rights of your family member. Support groups or counselors can help you with this information. · Take care of yourself.  Keep up with your own interests, such as your career, hobbies, and friends. Use exercise, positive self-talk, deep breathing, and other relaxing exercises to help lower your stress. · Give yourself time to grieve. You may need to deal with emotions such as anger, fear, and frustration. After you work through your feelings, you will be better able to care for yourself and your family. · If you are having a hard time with your feelings or with your relationship with your family member, talk with a counselor. When should you call for help? Call 911 anytime you think you may need emergency care. For example, call if:    · You feel like hurting yourself or someone else.     · Someone who has bipolar disorder displays dangerous behavior, and you think the person might hurt himself or herself or someone else.   [facility] your doctor now or seek immediate medical care if:    · You hear voices.     · Someone you know has bipolar disorder and talks about suicide. Keep the numbers for these national suicide hotlines: 2-050-821-TALK (8-287-751-482-726-8532) and 9-998-LDCNXEH (2-685.887.7377). If a suicide threat seems real, with a specific plan and a way to carry it out, stay with the person, or ask someone you trust to stay with the person, until you can get help.     · Someone you know has bipolar disorder and:  ? Starts to give away possessions. ? Is using illegal drugs or drinking alcohol heavily. ? Talks or writes about death, including writing suicide notes or talking about guns, knives, or pills. ? Talks or writes about hurting someone else. ? Starts to spend a lot of time alone. ? Acts very aggressively or suddenly appears calm. ? Talks about beliefs that are not based in reality (delusions).    Watch closely for changes in your health, and be sure to contact your doctor if:    · You cannot go to your counseling sessions. Where can you learn more? Go to http://alyson-ginna.info/.   Enter K052 in the search box to learn more about \"Bipolar Disorder: Care Instructions. \"  Current as of: December 7, 2017  Content Version: 11.8  © 1622-7419 Healthwise, [facility]. Care instructions adapted under license by [employer] (which disclaims liability or warranty for this information). If you have questions about a medical condition or this instruction, always ask your healthcare professional. [place] [address] any warranty or liability for your use of this information.

## 2018-10-26 NOTE — BH NOTES
Patient discharged to the care of self. Patient reports she plans to go to a hotel in [place]. Patient provided a taxi by facility. Patient denies SI, HI, and auditory hallucinations. Aftercare instructions reviewed, patient verbalized understanding, and copy provided.

## 2018-10-26 NOTE — BH NOTES
DIAMONDHJIAN. Note: The above pt is appeared to be very focused on staff (person writing) and has been calling staff \"bitch\" and cursing staff out majority of the morning due to her meal choices and staff (person writing) has called dietary this shift (dietary went off the list that was provided by the above pt) during there conversation. The above pt still being very argument yeyo towards staff and focused on her meals not being correct during this shift. She required re-direction this morning due to attempting to eat another peers food this morning. She has been very rude and argument yeyo towards staff majority of the morning this shift. She them proceeded to staff (person writing) and still demanding and focused and verbalized \"you christina people\"-\"I am going to tell you how I am I am a very nice person\". Staff encouraged the above pt that staff is \"here to help the above pt get well and get discharged. The above pt still is very argument yeyo and has been focused on her food choices the entire a.m. Portion of the shift.

## 2018-10-26 NOTE — BH NOTES
LUIS. Note[de-identified] The above pt re-shayne red redirection again while staff (person writing) was talking to another pt. She then verbalized to staff \"You better be wesley we are in the hospital because if we wont I would have something for you\" (towards pt writing) during this conversation. \"You'll just want to have control you are the reason why I did not get my food this morning\". She was encouraged to focus on her treatment while in the hospital so she can transition back to the community.

## 2018-10-30 NOTE — DISCHARGE SUMMARY
[facility] Angie Noe  MR#: [id number]  : 1960  ACCOUNT #: [de-identified]   ADMIT DATE: 10/21/2018  DISCHARGE DATE: 10/26/2018    Significant findings, history and physical exam was performed shortly after the patient was admitted to the facility, this being remarkable for the patient's chronic history of what she described was \"a bipolar disorder,\" but mostly due to her history of multiple cardiovascular problems. The patient prior to admission was taking a combination of multiple medications for the treatment of her multiple medical abnormalities including a history of type 2 diabetes mellitus and also history of hypertensive cardiovascular disease and a history of hepatitis C reactivity. She describes a history of adverse reactions versus allergies TO MULTIPLE MEDICATIONS INCLUDING ACTOS, BACTRIM-DISEASE, CIPROFLOXACIN, DICYCLOMINE, ERYTHROMYCIN, KEFLEX, MACROBID, PENICILLIN, PREDNISONE AND SULFA DRUGS (SULFONAMIDE TYPE OF ANTIBIOTICS). Multiple labs were performed at the time in which the patient was admitted to the facility, the tests including CBC with differential that showed a normal result with the exception of the presence of erythropenia with a red blood cell count of 3.87. Otherwise, normal test results. Differential was also normal.  Urinalysis showed only a trace of leukocyte esterase, but negative nitrites. It showed 4-10 white blood cells and 4-10 red blood cells. Coagulation studies not performed. Blood chemistry showed a sodium of 143, potassium 3.9, chloride of 108. BUN of 12, creatinine 0.72. This is a GFR for African-American of 60 mL per minute. Liver function tests completely normal.  Urine drug screen was found to be negative on 10/21.   A series of blood sugars were performed throughout the patient's hospital stay with her blood sugars being noted to be fairly well controlled with a high of 106 with a lowest of 66 on one occasion, not a threat. A urine culture and sensitivity was requested, results described \"on hold\" (?). MRI of the abdomen with MRCP with and without contrast due to the patient's history of chronic pancreatitis showed no evidence of acute findings. For the complete description of the test results attention is invited to the patient's chart, the results being explanatory. COURSE OF HOSPITALIZATION AND TREATMENT:  During this hospitalization, the patient was represcribed with a combination of medication that she indicated she had been taking as an outpatient and a combination of medication that she had responded very appropriately to, at least this is what she indicated to us. In addition to a discontinuation of the patient's Kathern Alar and due to her history of positive treatment response to Rexulti, the decision was made to proceed with a prescription for the latter. The patient prior admission had been prescribed also with a very low dose of mirtazapine with a dose of 7.5 mg at bedtime being also maintained. COURSE OF HOSPITALIZATION AND TREATMENT:  The patient was admitted to the [facility] program, was seen on an individual psychiatric basis and was also referred to the groups and the contents of the program.  Multiple collateral consults to followed with the dieticians due to the patient's concerns and requested in combination with foods not being provided by the cafeteria. Multiple attempts to try to help the patient in that respect followed. It became of the concern that the patient seemed to be very dissatisfied with anything and that was being done for her.   This included the help that she was provided with to be able to return back to [place] (she since admission had indicated that she was planning on returning back to [place], [place] to be treated there for her chronic pancreatitis), and there appeared to be also evidence of some manipulative behaviors addressing wanting her opinion that needed to be done regardless of the appropriateness of her requests. Nonetheless, with a prescription for brexpiprazole (Rexulti) an atypical antipsychotic that the patient had responded very well to for the treatment of her mood disorder, was noted to show again positive ways to help the patient specifically in regards to her mood lability and also her symptoms of depression to the point in which by 12/26 the patient showed major benefits from inpatient hospitalization and so we were able to proceed with a discharge to outpatient treatment. The patient's outpatient care needed to be provided in [place], a place where she was planning on going back to very possibly by Sunday or Monday. Steps were also taken throughout the patient's hospital stay to indirectly help with her mother being able to be placed in a shelter in the [facility]. CONDITION UPON DISCHARGE:  Upon discharge the patient was found to be not psychotic, not organic. She denied suicidal and/or homicidal thoughts, plans and/or intentions and was found to be psychiatrically competent  in that she knew what she was doing, she  knew the difference between right and wrong and she also knew what the consequence of her actions were. FINAL DIAGNOSES:  AXIS I:  Schizoaffective disorder, depressed type, rule out major depressive disorder, recurrent with psychosis, severe; posttraumatic stress disorder, chronic type. AXIS II:  Deferred. AXIS III:  Chronic pancreatitis by history. Cryoglobulinemia by history, type 2 diabetes mellitus under treatment, hypertensive cardiovascular disease, recent treatment; history of hepatitis C reactivity. History of adverse reaction versus allergies TO both with multiple medications including ACTOS, BACTRIM-DISEASE, CIPROFLOXACIN, DICYCLOMINE, ERYTHROMYCIN, KEFLEX, MACROBID, PENICILLINS, PREDNISONE AND SULFA DRUGS (SULFONAMIDE TYPE OF ANTIBIOTICS).     DISPOSITION:  The patient was provided with a taxi cab to take her to a motel of her choice  in the [facility]. She was strongly suggested to consider returning back to [place], [place] for further outpatient medical and psychiatric treatments which she indicated she was going to do. The patient's mother is to stay with her and is again planning on going with her to [place] as indicated. PRESCRIPTIONS UPON DISCHARGE:  The following prescriptions were given for 30 days without refills including Cardura 1 mg every night, Estrace 1 mg daily, TriCor 48 mg 1 daily, Apresoline 25 mg 1 tablet twice daily, Imodium 1 capsule every 4 hours as needed for diarrhea. Mesalamine (Delzicol) 400 mg 2 capsules 3 times daily, Lopressor 25 mg every morning, Remeron 7.5 mg 1 every night, Zofran 1 tablet by mouth every 4 hours as needed for nausea,  Ditropan XL 10 mg controlled release tablets to take 1 daily, Evista 60 mg every morning, Crestor 20 mg once every night, Januvia 100 mg tablets to take 1 daily, Valtrex 500 mg to take 2 tablets twice daily, Rexulti 2 mg every night and Protonix 40 mg every morning. Again, prescriptions were given for 4 weeks without refills. By the time of discharge, no evidence of medication side effects noted. DIAGNOSIS:  Based upon the patient's history, the presence of very strong AXIS II pathology, her overall prognosis is considered to be guarded. Obviously, it will depend upon the patient's outpatient treatment compliance and treatment response as to how her overall prognosis is.       MD MAXX Pa/LEE  D: 10/29/2018 11:04     T: 10/30/2018 09:35  JOB #: [id number]

## 2018-12-23 ENCOUNTER — EMERGENCY (EMERGENCY)
Facility: HOSPITAL | Age: 58
LOS: 1 days | Discharge: HOME | End: 2018-12-23
Admitting: EMERGENCY MEDICINE
Payer: MEDICARE

## 2018-12-23 VITALS
SYSTOLIC BLOOD PRESSURE: 133 MMHG | TEMPERATURE: 98 F | HEART RATE: 79 BPM | DIASTOLIC BLOOD PRESSURE: 74 MMHG | RESPIRATION RATE: 18 BRPM | OXYGEN SATURATION: 99 %

## 2018-12-23 VITALS
OXYGEN SATURATION: 95 % | HEART RATE: 82 BPM | SYSTOLIC BLOOD PRESSURE: 142 MMHG | DIASTOLIC BLOOD PRESSURE: 90 MMHG | RESPIRATION RATE: 20 BRPM | TEMPERATURE: 98 F

## 2018-12-23 DIAGNOSIS — E11.9 TYPE 2 DIABETES MELLITUS WITHOUT COMPLICATIONS: ICD-10-CM

## 2018-12-23 DIAGNOSIS — I10 ESSENTIAL (PRIMARY) HYPERTENSION: ICD-10-CM

## 2018-12-23 DIAGNOSIS — K62.5 HEMORRHAGE OF ANUS AND RECTUM: ICD-10-CM

## 2018-12-23 DIAGNOSIS — R10.84 GENERALIZED ABDOMINAL PAIN: ICD-10-CM

## 2018-12-23 DIAGNOSIS — Z88.0 ALLERGY STATUS TO PENICILLIN: ICD-10-CM

## 2018-12-23 DIAGNOSIS — Z88.1 ALLERGY STATUS TO OTHER ANTIBIOTIC AGENTS STATUS: ICD-10-CM

## 2018-12-23 DIAGNOSIS — R19.7 DIARRHEA, UNSPECIFIED: ICD-10-CM

## 2018-12-23 LAB
ALBUMIN SERPL ELPH-MCNC: 3.5 G/DL — SIGNIFICANT CHANGE UP (ref 3.4–5)
ALP SERPL-CCNC: 62 U/L — SIGNIFICANT CHANGE UP (ref 40–120)
ALT FLD-CCNC: 23 U/L — SIGNIFICANT CHANGE UP (ref 12–42)
ANION GAP SERPL CALC-SCNC: 8 MMOL/L — LOW (ref 9–16)
APPEARANCE UR: CLEAR — SIGNIFICANT CHANGE UP
AST SERPL-CCNC: 21 U/L — SIGNIFICANT CHANGE UP (ref 15–37)
BASOPHILS NFR BLD AUTO: 0.7 % — SIGNIFICANT CHANGE UP (ref 0–2)
BILIRUB SERPL-MCNC: 0.1 MG/DL — LOW (ref 0.2–1.2)
BILIRUB UR-MCNC: NEGATIVE — SIGNIFICANT CHANGE UP
BUN SERPL-MCNC: 14 MG/DL — SIGNIFICANT CHANGE UP (ref 7–23)
CALCIUM SERPL-MCNC: 8.8 MG/DL — SIGNIFICANT CHANGE UP (ref 8.5–10.5)
CHLORIDE SERPL-SCNC: 106 MMOL/L — SIGNIFICANT CHANGE UP (ref 96–108)
CO2 SERPL-SCNC: 27 MMOL/L — SIGNIFICANT CHANGE UP (ref 22–31)
COLOR SPEC: YELLOW — SIGNIFICANT CHANGE UP
CREAT SERPL-MCNC: 0.76 MG/DL — SIGNIFICANT CHANGE UP (ref 0.5–1.3)
DIFF PNL FLD: ABNORMAL
EOSINOPHIL NFR BLD AUTO: 3.2 % — SIGNIFICANT CHANGE UP (ref 0–6)
GLUCOSE SERPL-MCNC: 96 MG/DL — SIGNIFICANT CHANGE UP (ref 70–99)
GLUCOSE UR QL: NEGATIVE — SIGNIFICANT CHANGE UP
HCT VFR BLD CALC: 31.8 % — LOW (ref 34.5–45)
HGB BLD-MCNC: 10.5 G/DL — LOW (ref 11.5–15.5)
IMM GRANULOCYTES NFR BLD AUTO: 0.2 % — SIGNIFICANT CHANGE UP (ref 0–1.5)
KETONES UR-MCNC: NEGATIVE — SIGNIFICANT CHANGE UP
LEUKOCYTE ESTERASE UR-ACNC: NEGATIVE — SIGNIFICANT CHANGE UP
LIDOCAIN IGE QN: 316 U/L — SIGNIFICANT CHANGE UP (ref 73–393)
LYMPHOCYTES # BLD AUTO: 28.2 % — SIGNIFICANT CHANGE UP (ref 13–44)
MAGNESIUM SERPL-MCNC: 1.5 MG/DL — LOW (ref 1.6–2.6)
MCHC RBC-ENTMCNC: 32.2 PG — SIGNIFICANT CHANGE UP (ref 27–34)
MCHC RBC-ENTMCNC: 33 G/DL — SIGNIFICANT CHANGE UP (ref 32–36)
MCV RBC AUTO: 97.5 FL — SIGNIFICANT CHANGE UP (ref 80–100)
MONOCYTES NFR BLD AUTO: 12.1 % — SIGNIFICANT CHANGE UP (ref 2–14)
NEUTROPHILS NFR BLD AUTO: 55.6 % — SIGNIFICANT CHANGE UP (ref 43–77)
NITRITE UR-MCNC: NEGATIVE — SIGNIFICANT CHANGE UP
OB PNL STL: NEGATIVE — SIGNIFICANT CHANGE UP
PH UR: 6.5 — SIGNIFICANT CHANGE UP (ref 5–8)
PLATELET # BLD AUTO: 185 K/UL — SIGNIFICANT CHANGE UP (ref 150–400)
POTASSIUM SERPL-MCNC: 3.8 MMOL/L — SIGNIFICANT CHANGE UP (ref 3.5–5.3)
POTASSIUM SERPL-SCNC: 3.8 MMOL/L — SIGNIFICANT CHANGE UP (ref 3.5–5.3)
PROT SERPL-MCNC: 6.8 G/DL — SIGNIFICANT CHANGE UP (ref 6.4–8.2)
PROT UR-MCNC: ABNORMAL MG/DL
RBC # BLD: 3.26 M/UL — LOW (ref 3.8–5.2)
RBC # FLD: 13.4 % — SIGNIFICANT CHANGE UP (ref 10.3–14.5)
SODIUM SERPL-SCNC: 141 MMOL/L — SIGNIFICANT CHANGE UP (ref 132–145)
SP GR SPEC: 1.01 — SIGNIFICANT CHANGE UP (ref 1–1.03)
TROPONIN I SERPL-MCNC: <0.017 NG/ML — LOW (ref 0.02–0.06)
UROBILINOGEN FLD QL: 0.2 E.U./DL — SIGNIFICANT CHANGE UP
WBC # BLD: 5.9 K/UL — SIGNIFICANT CHANGE UP (ref 3.8–10.5)
WBC # FLD AUTO: 5.9 K/UL — SIGNIFICANT CHANGE UP (ref 3.8–10.5)

## 2018-12-23 PROCEDURE — 74177 CT ABD & PELVIS W/CONTRAST: CPT | Mod: 26

## 2018-12-23 PROCEDURE — 99284 EMERGENCY DEPT VISIT MOD MDM: CPT

## 2018-12-23 RX ORDER — SODIUM CHLORIDE 9 MG/ML
1000 INJECTION INTRAMUSCULAR; INTRAVENOUS; SUBCUTANEOUS ONCE
Qty: 0 | Refills: 0 | Status: COMPLETED | OUTPATIENT
Start: 2018-12-23 | End: 2018-12-23

## 2018-12-23 RX ADMIN — SODIUM CHLORIDE 2000 MILLILITER(S): 9 INJECTION INTRAMUSCULAR; INTRAVENOUS; SUBCUTANEOUS at 04:19

## 2018-12-23 NOTE — ED PROVIDER NOTE - OBJECTIVE STATEMENT
PMHX partial hysterectomy, DM, HTN, hyperlipidemia, pancreatitis, ex ETOH abuse c. dif. presents with generalized abdominal pain and bloody loose stools. states that she was admitted to [facility] from 12/9 to 12/12 for c dif. was given oral and IV vanc. had appointment with GI in 2 weeks. denies fever, chills, nightsweats.

## 2018-12-23 NOTE — ED ADULT TRIAGE NOTE - CHIEF COMPLAINT QUOTE
Patient complaining of rectal bleeding and infection stating that she was recently at another facility with c.diff and believes she still has it

## 2018-12-23 NOTE — ED PROVIDER NOTE - MEDICAL DECISION MAKING DETAILS
patient with DM, HTN, pancreatitis, h/o of c dif presents with generalized abdominal pain and loose bloody stools. will check labs, guaiac, do CT and continue to monitor

## 2018-12-25 LAB
CULTURE RESULTS: SIGNIFICANT CHANGE UP
SPECIMEN SOURCE: SIGNIFICANT CHANGE UP

## 2020-01-10 ENCOUNTER — EMERGENCY (EMERGENCY)
Facility: HOSPITAL | Age: 60
LOS: 1 days | Discharge: LEFT BEFORE DISCHARGE | End: 2020-01-10
Attending: EMERGENCY MEDICINE
Payer: MEDICARE

## 2020-01-10 VITALS
SYSTOLIC BLOOD PRESSURE: 131 MMHG | TEMPERATURE: 97 F | DIASTOLIC BLOOD PRESSURE: 87 MMHG | OXYGEN SATURATION: 99 % | HEIGHT: 66 IN | WEIGHT: 169.09 LBS | RESPIRATION RATE: 18 BRPM | HEART RATE: 86 BPM

## 2020-01-10 VITALS
SYSTOLIC BLOOD PRESSURE: 131 MMHG | DIASTOLIC BLOOD PRESSURE: 68 MMHG | HEART RATE: 54 BPM | OXYGEN SATURATION: 97 % | RESPIRATION RATE: 18 BRPM | TEMPERATURE: 98 F

## 2020-01-10 LAB
ALBUMIN SERPL ELPH-MCNC: 3.3 G/DL — LOW (ref 3.5–5)
ALP SERPL-CCNC: 66 U/L — SIGNIFICANT CHANGE UP (ref 40–120)
ALT FLD-CCNC: 30 U/L DA — SIGNIFICANT CHANGE UP (ref 10–60)
ANION GAP SERPL CALC-SCNC: 7 MMOL/L — SIGNIFICANT CHANGE UP (ref 5–17)
AST SERPL-CCNC: 11 U/L — SIGNIFICANT CHANGE UP (ref 10–40)
BASOPHILS # BLD AUTO: 0 K/UL — SIGNIFICANT CHANGE UP (ref 0–0.2)
BASOPHILS NFR BLD AUTO: 0 % — SIGNIFICANT CHANGE UP (ref 0–2)
BILIRUB SERPL-MCNC: 0.2 MG/DL — SIGNIFICANT CHANGE UP (ref 0.2–1.2)
BUN SERPL-MCNC: 15 MG/DL — SIGNIFICANT CHANGE UP (ref 7–18)
CALCIUM SERPL-MCNC: 8.9 MG/DL — SIGNIFICANT CHANGE UP (ref 8.4–10.5)
CHLORIDE SERPL-SCNC: 109 MMOL/L — HIGH (ref 96–108)
CO2 SERPL-SCNC: 26 MMOL/L — SIGNIFICANT CHANGE UP (ref 22–31)
CREAT SERPL-MCNC: 0.82 MG/DL — SIGNIFICANT CHANGE UP (ref 0.5–1.3)
EOSINOPHIL # BLD AUTO: 0 K/UL — SIGNIFICANT CHANGE UP (ref 0–0.5)
EOSINOPHIL NFR BLD AUTO: 0 % — SIGNIFICANT CHANGE UP (ref 0–6)
GLUCOSE SERPL-MCNC: 124 MG/DL — HIGH (ref 70–99)
HCT VFR BLD CALC: 36.8 % — SIGNIFICANT CHANGE UP (ref 34.5–45)
HGB BLD-MCNC: 11.9 G/DL — SIGNIFICANT CHANGE UP (ref 11.5–15.5)
LACTATE SERPL-SCNC: 2.3 MMOL/L — HIGH (ref 0.7–2)
LIDOCAIN IGE QN: 156 U/L — SIGNIFICANT CHANGE UP (ref 73–393)
LYMPHOCYTES # BLD AUTO: 0.51 K/UL — LOW (ref 1–3.3)
LYMPHOCYTES # BLD AUTO: 3 % — LOW (ref 13–44)
MCHC RBC-ENTMCNC: 31.3 PG — SIGNIFICANT CHANGE UP (ref 27–34)
MCHC RBC-ENTMCNC: 32.3 GM/DL — SIGNIFICANT CHANGE UP (ref 32–36)
MCV RBC AUTO: 96.8 FL — SIGNIFICANT CHANGE UP (ref 80–100)
MONOCYTES # BLD AUTO: 0 K/UL — SIGNIFICANT CHANGE UP (ref 0–0.9)
MONOCYTES NFR BLD AUTO: 0 % — LOW (ref 2–14)
NEUTROPHILS # BLD AUTO: 15.63 K/UL — HIGH (ref 1.8–7.4)
NEUTROPHILS NFR BLD AUTO: 92 % — HIGH (ref 43–77)
OB PNL STL: POSITIVE
PLATELET # BLD AUTO: 203 K/UL — SIGNIFICANT CHANGE UP (ref 150–400)
POTASSIUM SERPL-MCNC: 4.4 MMOL/L — SIGNIFICANT CHANGE UP (ref 3.5–5.3)
POTASSIUM SERPL-SCNC: 4.4 MMOL/L — SIGNIFICANT CHANGE UP (ref 3.5–5.3)
PROT SERPL-MCNC: 6.9 G/DL — SIGNIFICANT CHANGE UP (ref 6–8.3)
RBC # BLD: 3.8 M/UL — SIGNIFICANT CHANGE UP (ref 3.8–5.2)
RBC # FLD: 15.1 % — HIGH (ref 10.3–14.5)
SODIUM SERPL-SCNC: 142 MMOL/L — SIGNIFICANT CHANGE UP (ref 135–145)
WBC # BLD: 16.99 K/UL — HIGH (ref 3.8–10.5)
WBC # FLD AUTO: 16.99 K/UL — HIGH (ref 3.8–10.5)

## 2020-01-10 PROCEDURE — 96366 THER/PROPH/DIAG IV INF ADDON: CPT

## 2020-01-10 PROCEDURE — 36415 COLL VENOUS BLD VENIPUNCTURE: CPT

## 2020-01-10 PROCEDURE — 96365 THER/PROPH/DIAG IV INF INIT: CPT

## 2020-01-10 PROCEDURE — 96375 TX/PRO/DX INJ NEW DRUG ADDON: CPT

## 2020-01-10 PROCEDURE — 82272 OCCULT BLD FECES 1-3 TESTS: CPT

## 2020-01-10 PROCEDURE — 99284 EMERGENCY DEPT VISIT MOD MDM: CPT

## 2020-01-10 PROCEDURE — 83605 ASSAY OF LACTIC ACID: CPT

## 2020-01-10 PROCEDURE — 85027 COMPLETE CBC AUTOMATED: CPT

## 2020-01-10 PROCEDURE — 80053 COMPREHEN METABOLIC PANEL: CPT

## 2020-01-10 PROCEDURE — 83690 ASSAY OF LIPASE: CPT

## 2020-01-10 PROCEDURE — 99284 EMERGENCY DEPT VISIT MOD MDM: CPT | Mod: 25

## 2020-01-10 RX ORDER — ACETAMINOPHEN 500 MG
1000 TABLET ORAL ONCE
Refills: 0 | Status: COMPLETED | OUTPATIENT
Start: 2020-01-10 | End: 2020-01-10

## 2020-01-10 RX ORDER — SODIUM CHLORIDE 9 MG/ML
1000 INJECTION INTRAMUSCULAR; INTRAVENOUS; SUBCUTANEOUS ONCE
Refills: 0 | Status: COMPLETED | OUTPATIENT
Start: 2020-01-10 | End: 2020-01-10

## 2020-01-10 RX ORDER — ONDANSETRON 8 MG/1
4 TABLET, FILM COATED ORAL ONCE
Refills: 0 | Status: COMPLETED | OUTPATIENT
Start: 2020-01-10 | End: 2020-01-10

## 2020-01-10 RX ADMIN — Medication 1000 MILLIGRAM(S): at 17:35

## 2020-01-10 RX ADMIN — SODIUM CHLORIDE 1000 MILLILITER(S): 9 INJECTION INTRAMUSCULAR; INTRAVENOUS; SUBCUTANEOUS at 14:54

## 2020-01-10 RX ADMIN — ONDANSETRON 4 MILLIGRAM(S): 8 TABLET, FILM COATED ORAL at 15:06

## 2020-01-10 RX ADMIN — SODIUM CHLORIDE 1000 MILLILITER(S): 9 INJECTION INTRAMUSCULAR; INTRAVENOUS; SUBCUTANEOUS at 17:35

## 2020-01-10 RX ADMIN — Medication 400 MILLIGRAM(S): at 15:06

## 2020-01-10 NOTE — ED ADULT NURSE NOTE - PMH
Bipolar disorder    DM (diabetes mellitus)    HLD (hyperlipidemia)    HTN (hypertension)    PTSD (post-traumatic stress disorder)

## 2020-01-10 NOTE — ED PROVIDER NOTE - OBJECTIVE STATEMENT
Pt is a 59y F with significant PMHx of htn, dm, hld, bipolar, schizo affect, ptsd and no significant PSHx presenting to the ED with bloody diarrhea x5days. Pt has had pancreatitis as well as C-diff colitis in the past. Pt went to GI and was referred to the ER for a CT scan for further evaluation. In ED pt's abdomen is diffusely tender, worse in the epigastrium. Pt has multiple drug allergies and currently denies any additional complaints at this time.

## 2020-01-10 NOTE — ED PROVIDER NOTE - PATIENT PORTAL LINK FT
You can access the FollowMyHealth Patient Portal offered by [facility] by registering at the following website: [email or web address]. By joining [employer]’s FollowMyHealth portal, you will also be able to view your health information using other applications (apps) compatible with our system.

## 2020-01-10 NOTE — ED PROVIDER NOTE - PMH
Bipolar disorder    DM (diabetes mellitus)    HLD (hyperlipidemia)    HTN (hypertension)    PTSD (post-traumatic stress disorder) [address] Detailed

## 2020-01-10 NOTE — ED PROVIDER NOTE - CLINICAL SUMMARY MEDICAL DECISION MAKING FREE TEXT BOX
Pt presenting with abdominal pain with associated bloody diarrhea. Will order CT to r/o C-diff vs pancreatitis. Will check labs, provide supportive care and reassess.

## 2020-01-10 NOTE — ED PROVIDER NOTE - PROGRESS NOTE DETAILS
Ambar: s/o from dr esquivel to f/u ct.  pt states she no longer wants to wait and is leaving.  understand labs andd reason for ct.  pt states I been here over 5 hrs and no ct.  I am leaving.  pt informed that 2 peopl ahead and will call to expedite still refuses.  ama.  dx abd pain.

## 2020-01-10 NOTE — ED ADULT TRIAGE NOTE - CHIEF COMPLAINT QUOTE
walked in with c/o abdominal pain /blood in the stool and bloody  diarrhea . pt sent by Dr. SUJIT Umana for evaluation. for evaluation of pancreatitis and C. Diff

## 2020-01-10 NOTE — ED PROVIDER NOTE - REFUSAL OF SERVICE, MDM
I had a detailed discussion with the patient and/or guardian regarding the historical points, exam findings, and any diagnostic results supporting the the need to be admitted. Pt refuses ct scan and wait and is aware of risks  such as worsening condition and possible death. Patient is fully alert and oriented and has self determination and mental capacity to make decisions.

## 2020-06-01 ENCOUNTER — TELEPHONE (OUTPATIENT)
Dept: GASTROENTEROLOGY | Facility: CLINIC | Age: 60
End: 2020-06-01

## 2020-08-12 PROBLEM — Z00.00 ENCOUNTER FOR PREVENTIVE HEALTH EXAMINATION: Status: ACTIVE | Noted: 2020-08-12

## 2020-08-18 ENCOUNTER — APPOINTMENT (OUTPATIENT)
Dept: GASTROENTEROLOGY | Facility: CLINIC | Age: 60
End: 2020-08-18

## 2020-10-27 NOTE — ED ADULT NURSE NOTE - NSFALLRSKASSESSDT_ED_ALL_ED
Detail Level: Detailed Depth Of Biopsy: dermis Was A Bandage Applied: Yes Size Of Lesion In Cm: 0 Biopsy Type: H and E Biopsy Method: curette Anesthesia Type: 1% lidocaine with epinephrine Anesthesia Volume In Cc (Will Not Render If 0): 0.5 Hemostasis: Aluminum Chloride Wound Care: Vaseline Dressing: bandage Destruction After The Procedure: No Type Of Destruction Used: Curettage Curettage Text: The wound bed was treated with curettage after the biopsy was performed. Cryotherapy Text: The wound bed was treated with cryotherapy after the biopsy was performed. Electrodesiccation Text: The wound bed was treated with electrodesiccation after the biopsy was performed. Electrodesiccation And Curettage Text: The wound bed was treated with electrodesiccation and curettage after the biopsy was performed. Silver Nitrate Text: The wound bed was treated with silver nitrate after the biopsy was performed. Lab: 6 Lab Facility: 3 Consent: Written consent was obtained and risks were reviewed including but not limited to scarring, infection, bleeding, scabbing, incomplete removal, nerve damage and allergy to anesthesia. Post-Care Instructions: I reviewed with the patient in detail post-care instructions. Patient is to keep the biopsy site dry overnight, and then apply bacitracin twice daily until healed. Notification Instructions: Patient will be notified of biopsy results. However, patient instructed to call the office if not contacted within 2 weeks. Billing Type: Third-Party Bill Information: Selecting Yes will display possible errors in your note based on the variables you have selected. This validation is only offered as a suggestion for you. PLEASE NOTE THAT THE VALIDATION TEXT WILL BE REMOVED WHEN YOU FINALIZE YOUR NOTE. IF YOU WANT TO FAX A PRELIMINARY NOTE YOU WILL NEED TO TOGGLE THIS TO 'NO' IF YOU DO NOT WANT IT IN YOUR FAXED NOTE. 10-Osman-2020 18:06

## 2021-05-11 ENCOUNTER — TELEPHONE (OUTPATIENT)
Dept: GASTROENTEROLOGY | Facility: CLINIC | Age: 61
End: 2021-05-11

## 2021-05-11 NOTE — TELEPHONE ENCOUNTER
[facility] Call Center    Phone Message    May a detailed message be left on voicemail: yes     Reason for Call: The patient called stating she received Dr. Caba name from another physician in [place] regarding c-diff infections. She states she has had at least 3 infections in the past year, plus she has IBD. She states she has seen multiple gastroenterologists, and she continues to have issues. She is wanting to get scheduled for MFT. She will have all of her records sent to Dr. Caba care team for review. She says she will be in the [facility] for her appointments. Please advise. Thank you.    Action Taken: Message routed to:  Adult Clinics: Gastroenterology (GI) p [id number]    Travel Screening: Not Applicable

## 2021-05-12 NOTE — TELEPHONE ENCOUNTER
Patient contacted, informed that records are needed to determine if she will meet criteria to be seen by Dr. Caba, and will need to be in [place] for a consult if criteria is met.  Patient verbalizes understanding, will mail records.  Advised she will be contacted after records are received/reviewed to advise if criteria is met.      Blanche Farah RN

## 2021-05-14 ENCOUNTER — TELEPHONE (OUTPATIENT)
Dept: GASTROENTEROLOGY | Facility: CLINIC | Age: 61
End: 2021-05-14

## 2021-05-14 NOTE — TELEPHONE ENCOUNTER
[facility] Call Center    Phone Message    May a detailed message be left on voicemail: yes     Reason for Call: Other: new pt calling and wants to know what records to send over to be seen at our clinic, she would not listen to writer, please advise with her, she also needs some procedure but wouldn't say     Action Taken: Message routed to:  Adult Clinics: Gastroenterology (GI) p [id number]    Travel Screening: Not Applicable

## 2021-05-17 NOTE — TELEPHONE ENCOUNTER
Patient contacted, informed that she would need to contact the Medical Facilities directly for the records - informed that records were received from [facility] and [facility].  Patient verbalizes understanding.      Blanche Farah RN

## 2021-05-17 NOTE — TELEPHONE ENCOUNTER
Received records from [facility] and [facility], pulled records in CareEverywhere for several organizations also.  Upon review that last positive C-diff test was dated April 2020 so patient does not meet criteria to see Dr Caba.  Patient needs 3 or more positive C-diff tests within the last 12 months to qualify for an appointment.    Called patient and left message advising of this.    Carrie Hurley  Surgical Specialties Procedure   [employer] [place]  5/17/2021 1:45 PM

## 2021-05-17 NOTE — TELEPHONE ENCOUNTER
The patient called stating she needs the care team to call [facility] and [facility] (same entity) to get records on patient from 12/25/2019 ([facility]), and 1/4/20 ([place]) at [phone]. Also, call [facility] and [facility] (same entity in [place], [place]) to get records from June/July 2019 at [phone]. Please call patient with any questions. Thank you.

## 2021-05-17 NOTE — TELEPHONE ENCOUNTER
The patient called back regarding message below. Writer relayed message. Writer had to explain to the patient multiple times she needs to have 3 positive c-diff infections within the last 12 months. The patient verbalized understanding but was frustrated. Patient stated she would like her records mailed back to her.   [address] [facility]  [address]  [place] [address]   Thank you.

## 2021-05-17 NOTE — TELEPHONE ENCOUNTER
Patient contacted, informed that some records have been received and are being reviewed to determine if she meets criteria to schedule with Dr. Caba.  Patient advised that she will be contacted and informed if criteria is met or if additional information is necessary.     Blanche Farah RN

## 2021-09-14 ENCOUNTER — INPATIENT (INPATIENT)
Facility: HOSPITAL | Age: 61
LOS: 2 days | Discharge: NURSING, REHAB OR LONG-TERM CARE | DRG: 378 | End: 2021-09-17
Attending: STUDENT IN AN ORGANIZED HEALTH CARE EDUCATION/TRAINING PROGRAM | Admitting: STUDENT IN AN ORGANIZED HEALTH CARE EDUCATION/TRAINING PROGRAM
Payer: MEDICARE

## 2021-09-14 VITALS
OXYGEN SATURATION: 98 % | HEIGHT: 66 IN | TEMPERATURE: 98 F | WEIGHT: 160.06 LBS | HEART RATE: 64 BPM | DIASTOLIC BLOOD PRESSURE: 71 MMHG | RESPIRATION RATE: 16 BRPM | SYSTOLIC BLOOD PRESSURE: 118 MMHG

## 2021-09-14 DIAGNOSIS — R19.7 DIARRHEA, UNSPECIFIED: ICD-10-CM

## 2021-09-14 LAB
ALBUMIN SERPL ELPH-MCNC: 4 G/DL — SIGNIFICANT CHANGE UP (ref 3.3–5)
ALP SERPL-CCNC: 67 U/L — SIGNIFICANT CHANGE UP (ref 40–120)
ALT FLD-CCNC: 13 U/L — SIGNIFICANT CHANGE UP (ref 10–45)
ANION GAP SERPL CALC-SCNC: 11 MMOL/L — SIGNIFICANT CHANGE UP (ref 5–17)
APTT BLD: 28.1 SEC — SIGNIFICANT CHANGE UP (ref 27.5–35.5)
AST SERPL-CCNC: 10 U/L — SIGNIFICANT CHANGE UP (ref 10–40)
BASE EXCESS BLDV CALC-SCNC: -3.6 MMOL/L — LOW (ref -2–2)
BASOPHILS # BLD AUTO: 0.04 K/UL — SIGNIFICANT CHANGE UP (ref 0–0.2)
BASOPHILS NFR BLD AUTO: 0.4 % — SIGNIFICANT CHANGE UP (ref 0–2)
BILIRUB SERPL-MCNC: 0.1 MG/DL — LOW (ref 0.2–1.2)
BLD GP AB SCN SERPL QL: NEGATIVE — SIGNIFICANT CHANGE UP
BUN SERPL-MCNC: 10 MG/DL — SIGNIFICANT CHANGE UP (ref 7–23)
CA-I SERPL-SCNC: 1.28 MMOL/L — SIGNIFICANT CHANGE UP (ref 1.15–1.33)
CALCIUM SERPL-MCNC: 9.2 MG/DL — SIGNIFICANT CHANGE UP (ref 8.4–10.5)
CHLORIDE BLDV-SCNC: 108 MMOL/L — SIGNIFICANT CHANGE UP (ref 96–108)
CHLORIDE SERPL-SCNC: 108 MMOL/L — SIGNIFICANT CHANGE UP (ref 96–108)
CO2 BLDV-SCNC: 26 MMOL/L — SIGNIFICANT CHANGE UP (ref 22–26)
CO2 SERPL-SCNC: 20 MMOL/L — LOW (ref 22–31)
CREAT SERPL-MCNC: 0.85 MG/DL — SIGNIFICANT CHANGE UP (ref 0.5–1.3)
EOSINOPHIL # BLD AUTO: 0.01 K/UL — SIGNIFICANT CHANGE UP (ref 0–0.5)
EOSINOPHIL NFR BLD AUTO: 0.1 % — SIGNIFICANT CHANGE UP (ref 0–6)
GAS PNL BLDV: 139 MMOL/L — SIGNIFICANT CHANGE UP (ref 136–145)
GAS PNL BLDV: SIGNIFICANT CHANGE UP
GLUCOSE BLDV-MCNC: 124 MG/DL — HIGH (ref 70–99)
GLUCOSE SERPL-MCNC: 119 MG/DL — HIGH (ref 70–99)
HCO3 BLDV-SCNC: 25 MMOL/L — SIGNIFICANT CHANGE UP (ref 22–29)
HCT VFR BLD CALC: 37.7 % — SIGNIFICANT CHANGE UP (ref 34.5–45)
HCT VFR BLDA CALC: 36 % — SIGNIFICANT CHANGE UP (ref 34.5–46.5)
HGB BLD CALC-MCNC: 12.1 G/DL — SIGNIFICANT CHANGE UP (ref 11.7–16.1)
HGB BLD-MCNC: 11.7 G/DL — SIGNIFICANT CHANGE UP (ref 11.5–15.5)
IMM GRANULOCYTES NFR BLD AUTO: 0.5 % — SIGNIFICANT CHANGE UP (ref 0–1.5)
INR BLD: 1.02 RATIO — SIGNIFICANT CHANGE UP (ref 0.88–1.16)
LACTATE BLDV-MCNC: 1.2 MMOL/L — SIGNIFICANT CHANGE UP (ref 0.7–2)
LYMPHOCYTES # BLD AUTO: 1.55 K/UL — SIGNIFICANT CHANGE UP (ref 1–3.3)
LYMPHOCYTES # BLD AUTO: 14.4 % — SIGNIFICANT CHANGE UP (ref 13–44)
MCHC RBC-ENTMCNC: 30.2 PG — SIGNIFICANT CHANGE UP (ref 27–34)
MCHC RBC-ENTMCNC: 31 GM/DL — LOW (ref 32–36)
MCV RBC AUTO: 97.4 FL — SIGNIFICANT CHANGE UP (ref 80–100)
MONOCYTES # BLD AUTO: 0.47 K/UL — SIGNIFICANT CHANGE UP (ref 0–0.9)
MONOCYTES NFR BLD AUTO: 4.4 % — SIGNIFICANT CHANGE UP (ref 2–14)
NEUTROPHILS # BLD AUTO: 8.66 K/UL — HIGH (ref 1.8–7.4)
NEUTROPHILS NFR BLD AUTO: 80.2 % — HIGH (ref 43–77)
NRBC # BLD: 0 /100 WBCS — SIGNIFICANT CHANGE UP (ref 0–0)
PCO2 BLDV: 59 MMHG — HIGH (ref 39–42)
PH BLDV: 7.23 — LOW (ref 7.32–7.43)
PLATELET # BLD AUTO: 293 K/UL — SIGNIFICANT CHANGE UP (ref 150–400)
PO2 BLDV: 23 MMHG — LOW (ref 25–45)
POTASSIUM BLDV-SCNC: 4 MMOL/L — SIGNIFICANT CHANGE UP (ref 3.5–5.1)
POTASSIUM SERPL-MCNC: 4 MMOL/L — SIGNIFICANT CHANGE UP (ref 3.5–5.3)
POTASSIUM SERPL-SCNC: 4 MMOL/L — SIGNIFICANT CHANGE UP (ref 3.5–5.3)
PROT SERPL-MCNC: 7.1 G/DL — SIGNIFICANT CHANGE UP (ref 6–8.3)
PROTHROM AB SERPL-ACNC: 12.2 SEC — SIGNIFICANT CHANGE UP (ref 10.6–13.6)
RBC # BLD: 3.87 M/UL — SIGNIFICANT CHANGE UP (ref 3.8–5.2)
RBC # FLD: 15 % — HIGH (ref 10.3–14.5)
RH IG SCN BLD-IMP: POSITIVE — SIGNIFICANT CHANGE UP
SAO2 % BLDV: 27.7 % — LOW (ref 67–88)
SARS-COV-2 RNA SPEC QL NAA+PROBE: SIGNIFICANT CHANGE UP
SODIUM SERPL-SCNC: 139 MMOL/L — SIGNIFICANT CHANGE UP (ref 135–145)
WBC # BLD: 10.78 K/UL — HIGH (ref 3.8–10.5)
WBC # FLD AUTO: 10.78 K/UL — HIGH (ref 3.8–10.5)

## 2021-09-14 PROCEDURE — 99285 EMERGENCY DEPT VISIT HI MDM: CPT

## 2021-09-14 PROCEDURE — 99223 1ST HOSP IP/OBS HIGH 75: CPT

## 2021-09-14 PROCEDURE — 74177 CT ABD & PELVIS W/CONTRAST: CPT | Mod: 26,MA

## 2021-09-14 RX ORDER — ONDANSETRON 8 MG/1
4 TABLET, FILM COATED ORAL ONCE
Refills: 0 | Status: COMPLETED | OUTPATIENT
Start: 2021-09-14 | End: 2021-09-14

## 2021-09-14 RX ORDER — ACETAMINOPHEN 500 MG
1000 TABLET ORAL ONCE
Refills: 0 | Status: COMPLETED | OUTPATIENT
Start: 2021-09-14 | End: 2021-09-14

## 2021-09-14 RX ORDER — MORPHINE SULFATE 50 MG/1
2 CAPSULE, EXTENDED RELEASE ORAL ONCE
Refills: 0 | Status: DISCONTINUED | OUTPATIENT
Start: 2021-09-14 | End: 2021-09-14

## 2021-09-14 RX ORDER — SODIUM CHLORIDE 9 MG/ML
1000 INJECTION INTRAMUSCULAR; INTRAVENOUS; SUBCUTANEOUS ONCE
Refills: 0 | Status: COMPLETED | OUTPATIENT
Start: 2021-09-14 | End: 2021-09-14

## 2021-09-14 RX ADMIN — SODIUM CHLORIDE 1000 MILLILITER(S): 9 INJECTION INTRAMUSCULAR; INTRAVENOUS; SUBCUTANEOUS at 17:30

## 2021-09-14 RX ADMIN — Medication 400 MILLIGRAM(S): at 17:31

## 2021-09-14 RX ADMIN — ONDANSETRON 4 MILLIGRAM(S): 8 TABLET, FILM COATED ORAL at 17:30

## 2021-09-14 RX ADMIN — MORPHINE SULFATE 2 MILLIGRAM(S): 50 CAPSULE, EXTENDED RELEASE ORAL at 22:21

## 2021-09-14 NOTE — H&P ADULT - NSHPSOCIALHISTORY_GEN_ALL_CORE
Patient lives alone in apartment. She denies smoking, drinking alcohol, illicit drug use. She notes that she lives in an apartment with a lot of second-hand smoke. She previously used to work as a  but is currently on disability due to c.diff and pain. 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

## 2021-09-14 NOTE — H&P ADULT - PROBLEM SELECTOR PLAN 1
- possibly related to colitis in setting of C.diff, also could be having hemorrhoids bleeding in addition to diarrhea  - C.diff ordered  - GI PCR ordered  - given patient is allergic to PO vancomycin and flagyl, and seems to be failing treatment with fidaxomicin, plan to consult ID for assistance in treatment for C.diff  - consider allergy consult to test patient's allergies to various antibiotics  - GI consult to determine if fecal transplant would be possible in hospital - possibly related to colitis in setting of C.diff, also could be having hemorrhoids bleeding in addition to diarrhea  - C.diff ordered  - GI PCR ordered  - stool culture ordered  - stool count and fecal occult ordered  - given patient is allergic to PO vancomycin and flagyl, and seems to be failing treatment with fidaxomicin, consider ID consult for assistance in treatment for C.diff once studies come back  - consider allergy consult outpatient to test patient's allergies to various antibiotics  - GI consult to determine if fecal transplant would be possible in hospital - possibly related to colitis in setting of C.diff, also could be having hemorrhoids bleeding in addition to diarrhea  - C.diff ordered  - GI PCR ordered  - stool culture ordered  - stool count and fecal occult ordered  - given patient is allergic to PO vancomycin and flagyl, and seems to be failing treatment with fidaxomicin, consider ID consult for assistance in treatment for C.diff once studies come back  - consider allergy consult outpatient to test patient's allergies to various antibiotics  - GI consult to determine if fecal transplant would be possible in hospital  - contact precautions

## 2021-09-14 NOTE — H&P ADULT - NSHPREVIEWOFSYSTEMS_GEN_ALL_CORE
CONSTITUTIONAL:  No weight loss, fever, chills, weakness or fatigue.  HEENT:  Eyes:  No visual loss, blurred vision, double vision or yellow sclerae. Ears, Nose, Throat:  No hearing loss, sneezing, congestion, runny nose or sore throat.  SKIN:  No rash or itching.  CARDIOVASCULAR:  No chest pain, chest pressure or chest discomfort. No palpitations or edema.  RESPIRATORY:  No shortness of breath, cough or sputum.  GASTROINTESTINAL:  No anorexia, nausea, vomiting or diarrhea. No abdominal pain or blood.  GENITOURINARY:  No hematuria. No dysuria.  NEUROLOGICAL:  No headache, dizziness, syncope, paralysis, ataxia, numbness or tingling in the extremities. No change in bowel or bladder control.  MUSCULOSKELETAL:  No muscle, back pain, joint pain or stiffness.  HEMATOLOGIC:  No anemia, bleeding or bruising.  LYMPHATICS:  No enlarged nodes. No history of splenectomy.  PSYCHIATRIC:  No history of depression or anxiety.  ENDOCRINOLOGIC:  No reports of sweating, cold or heat intolerance. No polyuria or polydipsia.  ALLERGIES:  No history of asthma, hives, eczema or rhinitis. CONSTITUTIONAL:  No weight loss, fever, chills, weakness or fatigue.  HEENT:  Eyes:  No visual loss, blurred vision, double vision or yellow sclerae. Ears, Nose, Throat:  No hearing loss, sneezing, congestion, runny nose or sore throat.  SKIN:  No rash or itching.  CARDIOVASCULAR:  No chest pain, chest pressure or chest discomfort. No palpitations or edema.  RESPIRATORY:  No shortness of breath, cough or sputum.  GASTROINTESTINAL:  Diffuse abdominal pain. Diarrhea and rectal bleeding as above. Decreased PO intake. Rectal pain.  GENITOURINARY:  No hematuria. No dysuria. Patient notes some urgency that is chronic.  NEUROLOGICAL:  No headache, dizziness, syncope, paralysis, ataxia, numbness or tingling in the extremities. No change in bowel or bladder control.  MUSCULOSKELETAL:  Pain in lower extremities, slightly worse over past few days.   HEMATOLOGIC:  No anemia or bruising.  LYMPHATICS:  No enlarged nodes. No history of splenectomy.  PSYCHIATRIC:  No history of depression or anxiety.  ENDOCRINOLOGIC:  No reports of sweating, cold or heat intolerance. No polyuria or polydipsia.  ALLERGIES:  No history of asthma, hives, eczema or rhinitis. Several medical allergies. CONSTITUTIONAL:  No weight loss, fever, chills, weakness or fatigue.  HEENT:  Eyes:  No visual loss, blurred vision, double vision or yellow sclerae. Ears, Nose, Throat:  No hearing loss, sneezing, congestion, runny nose or sore throat.  SKIN:  No rash or itching.  CARDIOVASCULAR:  No chest pain, chest pressure or chest discomfort. No palpitations or edema.  RESPIRATORY:  No shortness of breath, cough or sputum.  GASTROINTESTINAL:  Diffuse abdominal pain. Diarrhea and rectal bleeding as above. Decreased PO intake. Rectal pain.  GENITOURINARY:  No hematuria. No dysuria. Patient notes some urgency that is chronic.  NEUROLOGICAL:  Some associated lightheadedness. No headache, dizziness, syncope, paralysis, ataxia, numbness or tingling in the extremities. No change in bowel or bladder control.  MUSCULOSKELETAL:  Pain in lower extremities, slightly worse over past few days. In a stocking pattern bilateral lower extremities.  HEMATOLOGIC:  No anemia or bruising.  LYMPHATICS:  No enlarged nodes. No history of splenectomy.  PSYCHIATRIC:  History of schizoaffective disorder, bipolar 1, and PTSD.   ENDOCRINOLOGIC:  No reports of sweating, cold or heat intolerance. No polyuria or polydipsia.  ALLERGIES:  No history of asthma, hives, eczema or rhinitis. Several medical allergies.

## 2021-09-14 NOTE — ED PROVIDER NOTE - PROGRESS NOTE DETAILS
Damien Reynoso, PGY-2- Returned page from on call patient's GI Dr. Mcgowan. Recommending admission for further work up. Unable to complete exam in office or work up the c diff.

## 2021-09-14 NOTE — ED PROVIDER NOTE - ATTENDING CONTRIBUTION TO CARE
RGUJRAL 60yo f hx listed presents with abdominal pain and diarrhea. Pt being worked up for CDiff currently on Daptomycin. Denies any fever, chills. On exam, Patient is awake,alert,oriented x 3. Patient is well appearing and in no acute distress. Patient's chest is clear to ausculation, +s1s2. Abdomen is soft nd/ + diffuse tenderness +BS. Extremity with no swelling or calf tenderness.   Check labs, CT to eval for symptoms, IVF and monitor.

## 2021-09-14 NOTE — H&P ADULT - NSHPLABSRESULTS_GEN_ALL_CORE
LABS:                        11.7   10.78 )-----------( 293      ( 14 Sep 2021 17:29 )             37.7     09-14    139  |  108  |  10  ----------------------------<  119<H>  4.0   |  20<L>  |  0.85    Ca    9.2      14 Sep 2021 17:29    TPro  7.1  /  Alb  4.0  /  TBili  0.1<L>  /  DBili  x   /  AST  10  /  ALT  13  /  AlkPhos  67  09-14    PT/INR - ( 14 Sep 2021 17:29 )   PT: 12.2 sec;   INR: 1.02 ratio         PTT - ( 14 Sep 2021 17:29 )  PTT:28.1 sec          RADIOLOGY & ADDITIONAL TESTS:    Imaging Personally Reviewed:    Consultant(s) Notes Reviewed:      Care Discussed with Consultants/Other Providers: LABS:                        11.7   10.78 )-----------( 293      ( 14 Sep 2021 17:29 )             37.7     09-14    139  |  108  |  10  ----------------------------<  119<H>  4.0   |  20<L>  |  0.85    Ca    9.2      14 Sep 2021 17:29    TPro  7.1  /  Alb  4.0  /  TBili  0.1<L>  /  DBili  x   /  AST  10  /  ALT  13  /  AlkPhos  67  09-14    PT/INR - ( 14 Sep 2021 17:29 )   PT: 12.2 sec;   INR: 1.02 ratio         PTT - ( 14 Sep 2021 17:29 )  PTT:28.1 sec    VBG pH 7.23, lactate 1.2, pCO2 59        RADIOLOGY & ADDITIONAL TESTS:    Imaging Personally Reviewed:  CT A/P w/IV contrast 9/14/2021  FINDINGS:  LOWER CHEST: Within normal limits.    LIVER: Small hypodensity in the right hepatic lobe, too small to characterize.  BILE DUCTS: Normal caliber.  GALLBLADDER: Contracted  SPLEEN: Within normal limits.  PANCREAS: Within normal limits.  ADRENALS: Within normal limits.  KIDNEYS/URETERS: Bilateral renal hypodensities, some which represent simple cysts and others too small to characterize. No hydronephrosis.    BLADDER: Within normal limits.  REPRODUCTIVE ORGANS: Hysterectomy. No adnexal masses.    BOWEL: Diverticulosis without evidence of diverticulitis. Nobowel obstruction. Appendix is normal.  PERITONEUM: No ascites.  VESSELS: Atherosclerotic changes.  RETROPERITONEUM/LYMPH NODES: No lymphadenopathy.  ABDOMINAL WALL: Within normal limits.  BONES: Within normal limits.    IMPRESSION:    No acute intra-abdominal pathology.    Consultant(s) Notes Reviewed:      Care Discussed with Consultants/Other Providers:

## 2021-09-14 NOTE — ED ADULT NURSE NOTE - OBJECTIVE STATEMENT
60 yo female PMH  HTN, DM, pancreatitis, hep c, recurrent cdiff currently taking fidaxomicin, on xarelto, scheduled for fecal transplant BIB EMS from home for multiple episodes of bright red bloody BMs x 2 days. Endorsing  nausea without emesis, abd discomfort, weakness. Abd soft, distended, nontender. Rectum red, excoriated, [place] IGNACIO at bedside aware. CM applied, EKG done. Education provided for cdiff precautions. Denies CP, SOB, fevers, chills, urinary symptoms, weakness, fatigue, numbness, tingling in upper and lower extremities, HA, blurry vision. VSS updated on plan of care. 60 yo female PMH  HTN, DM, pancreatitis, hep c, recurrent cdiff currently taking fidaxomicin, afib on xarelto, scheduled for fecal transplant BIB EMS from home for multiple episodes of bright red bloody BMs x 2 days. Endorsing  nausea without emesis, abd discomfort, weakness. Abd soft, distended, nontender. Rectum red, excoriated, [place] IGNACIO at bedside aware. CM applied, EKG done. Education provided for cdiff precautions. Denies CP, SOB, fevers, chills, urinary symptoms, weakness, fatigue, numbness, tingling in upper and lower extremities, HA, blurry vision. VSS updated on plan of care.

## 2021-09-14 NOTE — H&P ADULT - PROBLEM SELECTOR PLAN 7
- continue rexulti 1mg daily  - continue prazosin 2mg nightly  - hx of bipolar 1, schizoaffective disorder, and PTSD - continue rexulti 1mg daily, not on formulary  - continue prazosin 2mg nightly  - hx of bipolar 1, schizoaffective disorder, and PTSD - continue rexulti 1mg daily, ordered through pharmacy  - continue prazosin 2mg nightly  - hx of bipolar 1, schizoaffective disorder, and PTSD

## 2021-09-14 NOTE — H&P ADULT - PROBLEM/PLAN-7
No Change Improving Improving Improving No Change DISPLAY PLAN FREE TEXT Improving No Change Improving

## 2021-09-14 NOTE — H&P ADULT - PROBLEM SELECTOR PLAN 6
- continue Breo 200-25  - patient takes dexamethasone 1mg daily - continue Breo 200-25  - patient takes dexamethasone 1mg daily, can hold if concern for worsening infection

## 2021-09-14 NOTE — ED PROVIDER NOTE - PHYSICAL EXAMINATION
General: well appearing, no acute distress, AOx3  Skin: no rash, no pallor  Head: normocephalic, atraumatic  Eyes: clear conjunctiva, EOMI  ENMT: airway patent, no nasal discharge  Cardiovascular: normal rate, irregular rhythm, S1/S2  Pulmonary: clear to auscultation bilaterally, no rales, rhonchi, or wheeze  Abdomen: soft, diffuse mild tenderness, rectal exam chaperoned by STEPHANIE martin/mitra skin excoriations surrounding rectum, unable to tolerate internal exam   Musculoskeletal: moving extremities well, no deformity  Psych: normal mood, normal affect

## 2021-09-14 NOTE — H&P ADULT - NSICDXPASTMEDICALHX_GEN_ALL_CORE_FT
PAST MEDICAL HISTORY:  AF (atrial fibrillation)      PAST MEDICAL HISTORY:  AF (atrial fibrillation)     H/O: HTN (hypertension)     Pulmonary embolism 2020    Recurrent Clostridioides difficile infection     Recurrent pancreatitis      PAST MEDICAL HISTORY:  Bipolar 1 disorder     H/O: HTN (hypertension)     PTSD (post-traumatic stress disorder)     Pulmonary embolism 2020    Recurrent Clostridioides difficile infection     Recurrent pancreatitis     Schizoaffective disorder

## 2021-09-14 NOTE — H&P ADULT - PROBLEM SELECTOR PLAN 8
- dvt ppx: home xarelto  - dispo: admit to medicine for diarrhea/rectal bleed work-up - dvt ppx: home xarelto  - dispo: admit to medicine for diarrhea/rectal bleed work-up  - carb consistent diet with Ensures  - PT consult ordered

## 2021-09-14 NOTE — H&P ADULT - PROBLEM SELECTOR PLAN 4
- patient takes bisoprolol 10mg BID at home - patient takes bisoprolol 10mg BID at home  - converted to metoprolol 200mg daily - patient takes bisoprolol 10mg BID at home  - converted to metoprolol 200mg daily per EMR conversion

## 2021-09-14 NOTE — ED PROVIDER NOTE - NS ED ROS FT
General: no fever, no chills  Eyes: no vision changes, no eye pain  Cardiovascular: no chest pain, no edema  Respiratory: no cough, no shortness of breath  Gastrointestinal: +abdominal pain, +nausea, no vomiting, +diarrhea, +bloody stool   Genitourinary: no dysuria, no hematuria  Musculoskeletal: no muscle pain, no joint pain  Skin: no rash, no lesions  Neuro: no numbness, no tingling  Psych: no depression, no anxiety

## 2021-09-14 NOTE — H&P ADULT - PROBLEM SELECTOR PLAN 3
- SSI   - hold patient's home miglitol  - patient's leg pain likely related to diabetic neuropathy  - check A1C

## 2021-09-14 NOTE — H&P ADULT - PROBLEM SELECTOR PLAN 2
- more likely related to acute on chronic diarrhea more likely due to c.diff  - check lipase to r/o pancreatitis as patient has hx of recurrent pancreatitis  - PRN tylenol for pain  - patient takes alosetron 1mg BID at home - more likely related to acute on chronic diarrhea more likely due to c.diff, IBS also possible in setting of psychiatric history, or IBD  - check lipase to r/o pancreatitis as patient has hx of recurrent pancreatitis  - PRN tylenol for pain  - patient takes alosetron 1mg BID at home, hold for now, not on hospital formulary

## 2021-09-14 NOTE — H&P ADULT - ATTENDING COMMENTS
61F w/ PMH of PE on Xarelto, T2DM,  HTN, COPD, bipolar disorder, PTSD, recurrent C-diff and recurrent pancreatitis, and IBS presenting with multiple episodes of loose stools, per patient 5-6 BMS per day, also with rectal bleeding, and abdominal pain. Per nurse, stool is soft, not watery, unable to send for C-diff. Patient is currently on fidaxomicin however says it is not helping. She has been trying to get fecal microbial transplant but has been rejected by multiple different hospitals due to insurance. Will need to obtain more information from PCP/GI doctor Dr Juan Fong for collateral. Hgb stable and patient is hemodynamically stable. Will consult GI, obtain GI PCR, stool studies, monitor stool counts.

## 2021-09-14 NOTE — H&P ADULT - HISTORY OF PRESENT ILLNESS
Patient is a 62yo F with PMH of PE on xarelto, DM, HTN, COPD, schizoaffective disorder, Bipolar 1, PTSD, recurrent C diff infections Patient is a 62yo F with PMH of PE on xarelto, DM, HTN, COPD, schizoaffective disorder, Bipolar 1, PTSD, recurrent C diff infections, recurrent pancreatitis, ?IBS, ?ulcerative colitis presenting with abdominal pain, diarrhea, and rectal bleeding. Patient notes that she has had worsening diarrhea over the past 3-4 days.  Patient is a 62yo F with PMH of PE on xarelto, DM, HTN, COPD, schizoaffective disorder, Bipolar 1, PTSD, recurrent C diff infections, recurrent pancreatitis, ?IBS, ?ulcerative colitis presenting with abdominal pain, diarrhea, and rectal bleeding. Patient notes that she has had diarrhea over the past week with worsening over the past 3-4 days. She also endorses incontinence due to the diarrhea. She states the rectal bleeding started 2 days ago. She notes bright red blood when she uses toilet paper. She has also been having abdominal pain and diffuse tenderness. She also associates these symptoms with decreased PO intake and nausea. Patient notes that she is also having rectal pain due to the frequency of diarrhea. Pain is severe enough where she cannot even walk at times. Patient has been taking fidaxomicin 200mg TID for C.diff as she is allergic to vancomycin and metronidazole. She was recently planned for fecal transplant on 9/9 but it got cancelled due to insurance issues.     Patient has been seen by several physicians ([facility], [place], [place], [facility], [facility], etc) and hospitalized for C.diff since 2016. She has also been previously hospitalized for recurrent pancreatitis. She states that she has IBS and ulcerative colitis. She states that once previously she was treated with Stelera that improved her symptoms but she did not receive medication again.

## 2021-09-14 NOTE — H&P ADULT - NSICDXFAMILYHX_GEN_ALL_CORE_FT
FAMILY HISTORY:  Grandparent  Still living? Unknown  FH: GI cancer, Age at diagnosis: Age Unknown    Aunt  Still living? Unknown  FH: GI cancer, Age at diagnosis: Age Unknown

## 2021-09-14 NOTE — H&P ADULT - ASSESSMENT
Patient is a 62yo F with PMH of PE on xarelto, DM, HTN, COPD, schizoaffective disorder, Bipolar 1, PTSD, recurrent C diff infections, recurrent pancreatitis, ?IBS, ?ulcerative colitis presenting with abdominal pain, diarrhea, and rectal bleeding.

## 2021-09-14 NOTE — H&P ADULT - NSHPPHYSICALEXAM_GEN_ALL_CORE
Vital Signs Last 24 Hrs  T(C): 36.3 (14 Sep 2021 19:28), Max: 36.8 (14 Sep 2021 16:26)  T(F): 97.4 (14 Sep 2021 19:28), Max: 98.3 (14 Sep 2021 16:26)  HR: 71 (14 Sep 2021 19:28) (64 - 71)  BP: 123/85 (14 Sep 2021 19:28) (113/60 - 148/68)  BP(mean): --  RR: 18 (14 Sep 2021 19:28) (16 - 20)  SpO2: 98% (14 Sep 2021 19:28) (97% - 98%)  CAPILLARY BLOOD GLUCOSE        I&O's Summary      PHYSICAL EXAM:  GENERAL: NAD, well-developed  HEAD:  Atraumatic, Normocephalic  EYES: EOMI, PERRLA, conjunctiva and sclera clear  NECK: Supple, No JVD  CHEST/LUNG: Clear to auscultation bilaterally; No wheeze  HEART: Regular rate and rhythm; No murmurs, rubs, or gallops  ABDOMEN: Soft, Nontender, Nondistended; Bowel sounds present  EXTREMITIES:  2+ Peripheral Pulses, No clubbing, cyanosis, or edema  PSYCH: AAOx3  NEUROLOGY: non-focal  SKIN: No rashes or lesions Vital Signs Last 24 Hrs  T(C): 36.3 (14 Sep 2021 19:28), Max: 36.8 (14 Sep 2021 16:26)  T(F): 97.4 (14 Sep 2021 19:28), Max: 98.3 (14 Sep 2021 16:26)  HR: 71 (14 Sep 2021 19:28) (64 - 71)  BP: 123/85 (14 Sep 2021 19:28) (113/60 - 148/68)  BP(mean): --  RR: 18 (14 Sep 2021 19:28) (16 - 20)  SpO2: 98% (14 Sep 2021 19:28) (97% - 98%)  CAPILLARY BLOOD GLUCOSE        I&O's Summary      PHYSICAL EXAM:  GENERAL: sitting uncomfortably in bed  HEAD:  Atraumatic, Normocephalic  EYES: EOMI, PERRLA, conjunctiva and sclera clear  NECK: Supple, No JVD  CHEST/LUNG: Clear to auscultation bilaterally; No wheeze  HEART: Normal rate and regular rhythm; No murmurs, rubs, or gallops  ABDOMEN: Soft, diffusely tender to palpation throughout abdomen, Bowel sounds present  EXTREMITIES:  2+ Peripheral Pulses, No clubbing, cyanosis, or edema  PSYCH: AAOx3, tends to focus on one aspect   NEUROLOGY: non-focal  SKIN: No rashes or lesions Vital Signs Last 24 Hrs  T(C): 36.3 (14 Sep 2021 19:28), Max: 36.8 (14 Sep 2021 16:26)  T(F): 97.4 (14 Sep 2021 19:28), Max: 98.3 (14 Sep 2021 16:26)  HR: 71 (14 Sep 2021 19:28) (64 - 71)  BP: 123/85 (14 Sep 2021 19:28) (113/60 - 148/68)  BP(mean): --  RR: 18 (14 Sep 2021 19:28) (16 - 20)  SpO2: 98% (14 Sep 2021 19:28) (97% - 98%)  CAPILLARY BLOOD GLUCOSE        I&O's Summary      PHYSICAL EXAM:  GENERAL: sitting uncomfortably in bed  HEAD:  Atraumatic, Normocephalic  EYES: EOMI, PERRLA, conjunctiva and sclera clear  NECK: Supple, No JVD  CHEST/LUNG: Clear to auscultation bilaterally; No wheeze  HEART: Normal rate and regular rhythm; No murmurs, rubs, or gallops  ABDOMEN: Soft, diffusely tender to palpation throughout abdomen, Bowel sounds present  EXTREMITIES:  2+ Peripheral Pulses, No clubbing, cyanosis, or edema, pain in bilateral lower extremities  PSYCH: AAOx3, tends to focus on one aspect of thoughts  NEUROLOGY: non-focal  SKIN: No rashes or lesions

## 2021-09-14 NOTE — ED PROVIDER NOTE - OBJECTIVE STATEMENT
61 year old female with a pmhx of afib on Xarelto, HTN, DM, C. diff infection, presents to ED for evaluation of rectal bleeding. Patient states symptoms have been acute on chronic. Recently having diarrhea x1 week, with incontinence. States rectal bleeding began 2 days ago. Went to GI physician, but was unable to tolerate a rectal exam and was sent to ED. Has been taking Daptomycin for C. diff. States she has a Vancomycin allergy. Was scheduled for a fecal transplant but it recently got cancelled due to insurance issues. Reports nausea with eating and difficulty tolerating PO. Notes diffuse abd discomfort. Denies fever, chills, cp, sob, cough, vomiting, dysuria.   GI- Dr. Fong

## 2021-09-15 DIAGNOSIS — M54.9 DORSALGIA, UNSPECIFIED: ICD-10-CM

## 2021-09-15 DIAGNOSIS — R19.7 DIARRHEA, UNSPECIFIED: ICD-10-CM

## 2021-09-15 DIAGNOSIS — K62.5 HEMORRHAGE OF ANUS AND RECTUM: ICD-10-CM

## 2021-09-15 DIAGNOSIS — I10 ESSENTIAL (PRIMARY) HYPERTENSION: ICD-10-CM

## 2021-09-15 DIAGNOSIS — Z90.710 ACQUIRED ABSENCE OF BOTH CERVIX AND UTERUS: Chronic | ICD-10-CM

## 2021-09-15 DIAGNOSIS — E11.9 TYPE 2 DIABETES MELLITUS WITHOUT COMPLICATIONS: ICD-10-CM

## 2021-09-15 DIAGNOSIS — J44.9 CHRONIC OBSTRUCTIVE PULMONARY DISEASE, UNSPECIFIED: ICD-10-CM

## 2021-09-15 DIAGNOSIS — Z29.9 ENCOUNTER FOR PROPHYLACTIC MEASURES, UNSPECIFIED: ICD-10-CM

## 2021-09-15 DIAGNOSIS — R10.9 UNSPECIFIED ABDOMINAL PAIN: ICD-10-CM

## 2021-09-15 DIAGNOSIS — R09.89 OTHER SPECIFIED SYMPTOMS AND SIGNS INVOLVING THE CIRCULATORY AND RESPIRATORY SYSTEMS: ICD-10-CM

## 2021-09-15 DIAGNOSIS — I27.82 CHRONIC PULMONARY EMBOLISM: ICD-10-CM

## 2021-09-15 DIAGNOSIS — F31.9 BIPOLAR DISORDER, UNSPECIFIED: ICD-10-CM

## 2021-09-15 LAB
A1C WITH ESTIMATED AVERAGE GLUCOSE RESULT: 6.1 % — HIGH (ref 4–5.6)
ALBUMIN SERPL ELPH-MCNC: 3.5 G/DL — SIGNIFICANT CHANGE UP (ref 3.3–5)
ALP SERPL-CCNC: 62 U/L — SIGNIFICANT CHANGE UP (ref 40–120)
ALT FLD-CCNC: 10 U/L — SIGNIFICANT CHANGE UP (ref 10–45)
ANION GAP SERPL CALC-SCNC: 12 MMOL/L — SIGNIFICANT CHANGE UP (ref 5–17)
AST SERPL-CCNC: 8 U/L — LOW (ref 10–40)
BILIRUB SERPL-MCNC: 0.1 MG/DL — LOW (ref 0.2–1.2)
BUN SERPL-MCNC: 9 MG/DL — SIGNIFICANT CHANGE UP (ref 7–23)
CALCIUM SERPL-MCNC: 9.1 MG/DL — SIGNIFICANT CHANGE UP (ref 8.4–10.5)
CHLORIDE SERPL-SCNC: 109 MMOL/L — HIGH (ref 96–108)
CO2 SERPL-SCNC: 19 MMOL/L — LOW (ref 22–31)
CREAT SERPL-MCNC: 0.66 MG/DL — SIGNIFICANT CHANGE UP (ref 0.5–1.3)
CULTURE RESULTS: SIGNIFICANT CHANGE UP
ESTIMATED AVERAGE GLUCOSE: 128 MG/DL — HIGH (ref 68–114)
GLUCOSE SERPL-MCNC: 121 MG/DL — HIGH (ref 70–99)
HCT VFR BLD CALC: 37.1 % — SIGNIFICANT CHANGE UP (ref 34.5–45)
HGB BLD-MCNC: 11.7 G/DL — SIGNIFICANT CHANGE UP (ref 11.5–15.5)
LIDOCAIN IGE QN: 53 U/L — SIGNIFICANT CHANGE UP (ref 7–60)
MAGNESIUM SERPL-MCNC: 1.9 MG/DL — SIGNIFICANT CHANGE UP (ref 1.6–2.6)
MCHC RBC-ENTMCNC: 30.6 PG — SIGNIFICANT CHANGE UP (ref 27–34)
MCHC RBC-ENTMCNC: 31.5 GM/DL — LOW (ref 32–36)
MCV RBC AUTO: 97.1 FL — SIGNIFICANT CHANGE UP (ref 80–100)
NRBC # BLD: 0 /100 WBCS — SIGNIFICANT CHANGE UP (ref 0–0)
PHOSPHATE SERPL-MCNC: 4.2 MG/DL — SIGNIFICANT CHANGE UP (ref 2.5–4.5)
PLATELET # BLD AUTO: 246 K/UL — SIGNIFICANT CHANGE UP (ref 150–400)
POTASSIUM SERPL-MCNC: 3.9 MMOL/L — SIGNIFICANT CHANGE UP (ref 3.5–5.3)
POTASSIUM SERPL-SCNC: 3.9 MMOL/L — SIGNIFICANT CHANGE UP (ref 3.5–5.3)
PROT SERPL-MCNC: 6.5 G/DL — SIGNIFICANT CHANGE UP (ref 6–8.3)
RBC # BLD: 3.82 M/UL — SIGNIFICANT CHANGE UP (ref 3.8–5.2)
RBC # FLD: 14.9 % — HIGH (ref 10.3–14.5)
SODIUM SERPL-SCNC: 140 MMOL/L — SIGNIFICANT CHANGE UP (ref 135–145)
SPECIMEN SOURCE: SIGNIFICANT CHANGE UP
WBC # BLD: 10.77 K/UL — HIGH (ref 3.8–10.5)
WBC # FLD AUTO: 10.77 K/UL — HIGH (ref 3.8–10.5)

## 2021-09-15 PROCEDURE — 99233 SBSQ HOSP IP/OBS HIGH 50: CPT | Mod: GC

## 2021-09-15 PROCEDURE — 99223 1ST HOSP IP/OBS HIGH 75: CPT

## 2021-09-15 RX ORDER — GLUCAGON INJECTION, SOLUTION 0.5 MG/.1ML
1 INJECTION, SOLUTION SUBCUTANEOUS ONCE
Refills: 0 | Status: DISCONTINUED | OUTPATIENT
Start: 2021-09-15 | End: 2021-09-17

## 2021-09-15 RX ORDER — BISOPROLOL FUMARATE 10 MG/1
10 TABLET, FILM COATED ORAL
Refills: 0 | Status: DISCONTINUED | OUTPATIENT
Start: 2021-09-15 | End: 2021-09-17

## 2021-09-15 RX ORDER — MORPHINE SULFATE 50 MG/1
1 CAPSULE, EXTENDED RELEASE ORAL EVERY 6 HOURS
Refills: 0 | Status: DISCONTINUED | OUTPATIENT
Start: 2021-09-15 | End: 2021-09-17

## 2021-09-15 RX ORDER — INFLUENZA VIRUS VACCINE 15; 15; 15; 15 UG/.5ML; UG/.5ML; UG/.5ML; UG/.5ML
0.5 SUSPENSION INTRAMUSCULAR ONCE
Refills: 0 | Status: DISCONTINUED | OUTPATIENT
Start: 2021-09-15 | End: 2021-09-17

## 2021-09-15 RX ORDER — ONDANSETRON 8 MG/1
4 TABLET, FILM COATED ORAL ONCE
Refills: 0 | Status: COMPLETED | OUTPATIENT
Start: 2021-09-15 | End: 2021-09-16

## 2021-09-15 RX ORDER — DEXTROSE 50 % IN WATER 50 %
25 SYRINGE (ML) INTRAVENOUS ONCE
Refills: 0 | Status: DISCONTINUED | OUTPATIENT
Start: 2021-09-15 | End: 2021-09-17

## 2021-09-15 RX ORDER — INSULIN LISPRO 100/ML
VIAL (ML) SUBCUTANEOUS
Refills: 0 | Status: DISCONTINUED | OUTPATIENT
Start: 2021-09-15 | End: 2021-09-17

## 2021-09-15 RX ORDER — DEXAMETHASONE 0.5 MG/5ML
1 ELIXIR ORAL DAILY
Refills: 0 | Status: DISCONTINUED | OUTPATIENT
Start: 2021-09-15 | End: 2021-09-17

## 2021-09-15 RX ORDER — DEXTROSE 50 % IN WATER 50 %
15 SYRINGE (ML) INTRAVENOUS ONCE
Refills: 0 | Status: DISCONTINUED | OUTPATIENT
Start: 2021-09-15 | End: 2021-09-17

## 2021-09-15 RX ORDER — SODIUM CHLORIDE 9 MG/ML
1000 INJECTION, SOLUTION INTRAVENOUS
Refills: 0 | Status: DISCONTINUED | OUTPATIENT
Start: 2021-09-15 | End: 2021-09-17

## 2021-09-15 RX ORDER — METOPROLOL TARTRATE 50 MG
200 TABLET ORAL DAILY
Refills: 0 | Status: DISCONTINUED | OUTPATIENT
Start: 2021-09-15 | End: 2021-09-15

## 2021-09-15 RX ORDER — RIVAROXABAN 15 MG-20MG
20 KIT ORAL
Refills: 0 | Status: DISCONTINUED | OUTPATIENT
Start: 2021-09-15 | End: 2021-09-17

## 2021-09-15 RX ORDER — DOXAZOSIN MESYLATE 4 MG
2 TABLET ORAL AT BEDTIME
Refills: 0 | Status: DISCONTINUED | OUTPATIENT
Start: 2021-09-15 | End: 2021-09-17

## 2021-09-15 RX ORDER — BREXPIPRAZOLE 0.25 MG/1
1 TABLET ORAL DAILY
Refills: 0 | Status: DISCONTINUED | OUTPATIENT
Start: 2021-09-15 | End: 2021-09-17

## 2021-09-15 RX ORDER — ACETAMINOPHEN 500 MG
650 TABLET ORAL EVERY 6 HOURS
Refills: 0 | Status: DISCONTINUED | OUTPATIENT
Start: 2021-09-15 | End: 2021-09-17

## 2021-09-15 RX ORDER — BUDESONIDE AND FORMOTEROL FUMARATE DIHYDRATE 160; 4.5 UG/1; UG/1
2 AEROSOL RESPIRATORY (INHALATION)
Refills: 0 | Status: DISCONTINUED | OUTPATIENT
Start: 2021-09-15 | End: 2021-09-17

## 2021-09-15 RX ORDER — INSULIN LISPRO 100/ML
VIAL (ML) SUBCUTANEOUS AT BEDTIME
Refills: 0 | Status: DISCONTINUED | OUTPATIENT
Start: 2021-09-15 | End: 2021-09-17

## 2021-09-15 RX ORDER — DEXTROSE 50 % IN WATER 50 %
12.5 SYRINGE (ML) INTRAVENOUS ONCE
Refills: 0 | Status: DISCONTINUED | OUTPATIENT
Start: 2021-09-15 | End: 2021-09-17

## 2021-09-15 RX ORDER — OXYBUTYNIN CHLORIDE 5 MG
10 TABLET ORAL EVERY 12 HOURS
Refills: 0 | Status: DISCONTINUED | OUTPATIENT
Start: 2021-09-15 | End: 2021-09-17

## 2021-09-15 RX ADMIN — Medication 650 MILLIGRAM(S): at 04:06

## 2021-09-15 RX ADMIN — MORPHINE SULFATE 1 MILLIGRAM(S): 50 CAPSULE, EXTENDED RELEASE ORAL at 19:46

## 2021-09-15 RX ADMIN — BREXPIPRAZOLE 1 MILLIGRAM(S): 0.25 TABLET ORAL at 13:25

## 2021-09-15 RX ADMIN — BUDESONIDE AND FORMOTEROL FUMARATE DIHYDRATE 2 PUFF(S): 160; 4.5 AEROSOL RESPIRATORY (INHALATION) at 18:33

## 2021-09-15 RX ADMIN — Medication 2 MILLIGRAM(S): at 22:26

## 2021-09-15 RX ADMIN — Medication 10 MILLIGRAM(S): at 05:04

## 2021-09-15 RX ADMIN — Medication 1 MILLIGRAM(S): at 13:24

## 2021-09-15 RX ADMIN — BISOPROLOL FUMARATE 10 MILLIGRAM(S): 10 TABLET, FILM COATED ORAL at 22:22

## 2021-09-15 RX ADMIN — Medication 10 MILLIGRAM(S): at 18:33

## 2021-09-15 RX ADMIN — MORPHINE SULFATE 1 MILLIGRAM(S): 50 CAPSULE, EXTENDED RELEASE ORAL at 20:15

## 2021-09-15 RX ADMIN — MORPHINE SULFATE 1 MILLIGRAM(S): 50 CAPSULE, EXTENDED RELEASE ORAL at 14:12

## 2021-09-15 RX ADMIN — RIVAROXABAN 20 MILLIGRAM(S): KIT at 22:26

## 2021-09-15 NOTE — PROGRESS NOTE ADULT - SUBJECTIVE AND OBJECTIVE BOX
PROGRESS NOTE:   Authored by Dr. Josette Mojica MD (PGY-1). Pager [facility] [phone] / [facility]     Patient is a 61y old  Female who presents with a chief complaint of Rectal bleeding (14 Sep 2021 21:58)      SUBJECTIVE / OVERNIGHT EVENTS:      ADDITIONAL REVIEW OF SYSTEMS:    MEDICATIONS  (STANDING):  brexpiprazole 1 milliGRAM(s) Oral daily  budesonide 160 MICROgram(s)/formoterol 4.5 MICROgram(s) Inhaler 2 Puff(s) Inhalation two times a day  dexAMETHasone     Tablet 1 milliGRAM(s) Oral daily  dextrose 40% Gel 15 Gram(s) Oral once  dextrose 5%. 1000 milliLiter(s) (50 mL/Hr) IV Continuous <Continuous>  dextrose 5%. 1000 milliLiter(s) (100 mL/Hr) IV Continuous <Continuous>  dextrose 50% Injectable 25 Gram(s) IV Push once  dextrose 50% Injectable 12.5 Gram(s) IV Push once  dextrose 50% Injectable 25 Gram(s) IV Push once  doxazosin 2 milliGRAM(s) Oral at bedtime  glucagon  Injectable 1 milliGRAM(s) IntraMuscular once  influenza   Vaccine 0.5 milliLiter(s) IntraMuscular once  insulin lispro (ADMELOG) corrective regimen sliding scale   SubCutaneous three times a day before meals  insulin lispro (ADMELOG) corrective regimen sliding scale   SubCutaneous at bedtime  metoprolol succinate  milliGRAM(s) Oral daily  oxybutynin 10 milliGRAM(s) Oral every 12 hours  rivaroxaban 20 milliGRAM(s) Oral <User Schedule>    MEDICATIONS  (PRN):  acetaminophen   Tablet .. 650 milliGRAM(s) Oral every 6 hours PRN Mild Pain (1 - 3), Moderate Pain (4 - 6)  ondansetron Injectable 4 milliGRAM(s) IV Push once PRN Nausea and/or Vomiting      CAPILLARY BLOOD GLUCOSE        I&O's Summary      PHYSICAL EXAM:  Vital Signs Last 24 Hrs  T(C): 37.1 (15 Sep 2021 00:32), Max: 37.1 (15 Sep 2021 00:32)  T(F): 98.7 (15 Sep 2021 00:32), Max: 98.7 (15 Sep 2021 00:32)  HR: 65 (15 Sep 2021 00:32) (64 - 78)  BP: 136/86 (15 Sep 2021 00:32) (113/60 - 148/68)  BP(mean): --  RR: 18 (15 Sep 2021 00:32) (16 - 20)  SpO2: 99% (15 Sep 2021 00:32) (97% - 99%)    CONSTITUTIONAL: NAD, well-developed  RESPIRATORY: Normal respiratory effort; lungs are clear to auscultation bilaterally  CARDIOVASCULAR: Regular rate and rhythm, normal S1 and S2, no murmur/rub/gallop; No lower extremity edema; Peripheral pulses are 2+ bilaterally  ABDOMEN: Nontender to palpation, normoactive bowel sounds, no rebound/guarding; No hepatosplenomegaly  MUSCLOSKELETAL: no clubbing or cyanosis of digits; no joint swelling or tenderness to palpation  PSYCH: A+O to person, place, and time; affect appropriate    LABS:                        11.7   10.78 )-----------( 293      ( 14 Sep 2021 17:29 )             37.7     09-15    140  |  109<H>  |  9   ----------------------------<  121<H>  3.9   |  19<L>  |  0.66    Ca    9.1      15 Sep 2021 05:49  Phos  4.2     09-15  Mg     1.9     09-15    TPro  6.5  /  Alb  3.5  /  TBili  0.1<L>  /  DBili  x   /  AST  8<L>  /  ALT  10  /  AlkPhos  62  09-15    PT/INR - ( 14 Sep 2021 17:29 )   PT: 12.2 sec;   INR: 1.02 ratio         PTT - ( 14 Sep 2021 17:29 )  PTT:28.1 sec            Tele Reviewed:    RADIOLOGY & ADDITIONAL TESTS:  Results Reviewed:   Imaging Personally Reviewed:  Electrocardiogram Personally Reviewed:     PROGRESS NOTE:   Authored by Dr. Josette Mojica MD (PGY-1). Pager [facility] [phone] / [facility]     Patient is a 61y old  Female who presents with a chief complaint of Rectal bleeding (14 Sep 2021 21:58)      SUBJECTIVE / OVERNIGHT EVENTS:      ADDITIONAL REVIEW OF SYSTEMS:    MEDICATIONS  (STANDING):  brexpiprazole 1 milliGRAM(s) Oral daily  budesonide 160 MICROgram(s)/formoterol 4.5 MICROgram(s) Inhaler 2 Puff(s) Inhalation two times a day  dexAMETHasone     Tablet 1 milliGRAM(s) Oral daily  dextrose 40% Gel 15 Gram(s) Oral once  dextrose 5%. 1000 milliLiter(s) (50 mL/Hr) IV Continuous <Continuous>  dextrose 5%. 1000 milliLiter(s) (100 mL/Hr) IV Continuous <Continuous>  dextrose 50% Injectable 25 Gram(s) IV Push once  dextrose 50% Injectable 12.5 Gram(s) IV Push once  dextrose 50% Injectable 25 Gram(s) IV Push once  doxazosin 2 milliGRAM(s) Oral at bedtime  glucagon  Injectable 1 milliGRAM(s) IntraMuscular once  influenza   Vaccine 0.5 milliLiter(s) IntraMuscular once  insulin lispro (ADMELOG) corrective regimen sliding scale   SubCutaneous three times a day before meals  insulin lispro (ADMELOG) corrective regimen sliding scale   SubCutaneous at bedtime  metoprolol succinate  milliGRAM(s) Oral daily  oxybutynin 10 milliGRAM(s) Oral every 12 hours  rivaroxaban 20 milliGRAM(s) Oral <User Schedule>    MEDICATIONS  (PRN):  acetaminophen   Tablet .. 650 milliGRAM(s) Oral every 6 hours PRN Mild Pain (1 - 3), Moderate Pain (4 - 6)  ondansetron Injectable 4 milliGRAM(s) IV Push once PRN Nausea and/or Vomiting      CAPILLARY BLOOD GLUCOSE        I&O's Summary      PHYSICAL EXAM:  Vital Signs Last 24 Hrs  T(C): 37.1 (15 Sep 2021 00:32), Max: 37.1 (15 Sep 2021 00:32)  T(F): 98.7 (15 Sep 2021 00:32), Max: 98.7 (15 Sep 2021 00:32)  HR: 65 (15 Sep 2021 00:32) (64 - 78)  BP: 136/86 (15 Sep 2021 00:32) (113/60 - 148/68)  BP(mean): --  RR: 18 (15 Sep 2021 00:32) (16 - 20)  SpO2: 99% (15 Sep 2021 00:32) (97% - 99%)    CONSTITUTIONAL: NAD, well-developed  RESPIRATORY: Normal respiratory effort; lungs are clear to auscultation bilaterally  CARDIOVASCULAR: Regular rate and rhythm, normal S1 and S2, no murmur/rub/gallop; No lower extremity edema; Peripheral pulses are 2+ bilaterally  ABDOMEN: Nontender to palpation, normoactive bowel sounds, no rebound/guarding; No hepatosplenomegaly  MUSCLOSKELETAL: no clubbing or cyanosis of digits; no joint swelling or tenderness to palpation  PSYCH: A+O to person, place, and time; affect appropriate    LABS:                        11.7   10.78 )-----------( 293      ( 14 Sep 2021 17:29 )             37.7     09-15    140  |  109<H>  |  9   ----------------------------<  121<H>  3.9   |  19<L>  |  0.66    Ca    9.1      15 Sep 2021 05:49  Phos  4.2     09-15  Mg     1.9     09-15    TPro  6.5  /  Alb  3.5  /  TBili  0.1<L>  /  DBili  x   /  AST  8<L>  /  ALT  10  /  AlkPhos  62  09-15    PT/INR - ( 14 Sep 2021 17:29 )   PT: 12.2 sec;   INR: 1.02 ratio         PTT - ( 14 Sep 2021 17:29 )  PTT:28.1 sec         PROGRESS NOTE:   Authored by Dr. Josette Mojica MD (PGY-1). Pager [facility] [phone] / [facility]     Patient is a 61y old  Female who presents with a chief complaint of Rectal bleeding (14 Sep 2021 21:58). Pt sitting in bed surrounded by loose paper work (hospital papers, hand written notes, etc). The patient states she has chronic left lower back pain that is acting up. Mild epigastric pain, but denies any nausea, vomiting, constipation, recurrent loose stools since admission. Her stools are not liquid, but not hard. Noted blood prior to admission, no recurrent episode of bloody diarrhea.       ADDITIONAL REVIEW OF SYSTEMS:    MEDICATIONS  (STANDING):  brexpiprazole 1 milliGRAM(s) Oral daily  budesonide 160 MICROgram(s)/formoterol 4.5 MICROgram(s) Inhaler 2 Puff(s) Inhalation two times a day  dexAMETHasone     Tablet 1 milliGRAM(s) Oral daily  dextrose 40% Gel 15 Gram(s) Oral once  dextrose 5%. 1000 milliLiter(s) (50 mL/Hr) IV Continuous <Continuous>  dextrose 5%. 1000 milliLiter(s) (100 mL/Hr) IV Continuous <Continuous>  dextrose 50% Injectable 25 Gram(s) IV Push once  dextrose 50% Injectable 12.5 Gram(s) IV Push once  dextrose 50% Injectable 25 Gram(s) IV Push once  doxazosin 2 milliGRAM(s) Oral at bedtime  glucagon  Injectable 1 milliGRAM(s) IntraMuscular once  influenza   Vaccine 0.5 milliLiter(s) IntraMuscular once  insulin lispro (ADMELOG) corrective regimen sliding scale   SubCutaneous three times a day before meals  insulin lispro (ADMELOG) corrective regimen sliding scale   SubCutaneous at bedtime  metoprolol succinate  milliGRAM(s) Oral daily  oxybutynin 10 milliGRAM(s) Oral every 12 hours  rivaroxaban 20 milliGRAM(s) Oral <User Schedule>    MEDICATIONS  (PRN):  acetaminophen   Tablet .. 650 milliGRAM(s) Oral every 6 hours PRN Mild Pain (1 - 3), Moderate Pain (4 - 6)  ondansetron Injectable 4 milliGRAM(s) IV Push once PRN Nausea and/or Vomiting      CAPILLARY BLOOD GLUCOSE        I&O's Summary      PHYSICAL EXAM:  Vital Signs Last 24 Hrs  T(C): 37.1 (15 Sep 2021 00:32), Max: 37.1 (15 Sep 2021 00:32)  T(F): 98.7 (15 Sep 2021 00:32), Max: 98.7 (15 Sep 2021 00:32)  HR: 65 (15 Sep 2021 00:32) (64 - 78)  BP: 136/86 (15 Sep 2021 00:32) (113/60 - 148/68)  BP(mean): --  RR: 18 (15 Sep 2021 00:32) (16 - 20)  SpO2: 99% (15 Sep 2021 00:32) (97% - 99%)    CONSTITUTIONAL: NAD, well-developed  RESPIRATORY: Normal respiratory effort; lungs are clear to auscultation bilaterally  CARDIOVASCULAR: Regular rate and rhythm, normal S1 and S2, no murmur/rub/gallop; No lower extremity edema; Peripheral pulses are 2+ bilaterally  ABDOMEN: Nontender to palpation, normoactive bowel sounds, no rebound/guarding; No hepatosplenomegaly  MUSCLOSKELETAL: L paraspinal ttp, no clubbing or cyanosis of digits; no joint swelling or tenderness to palpation  PSYCH: A+O to person, place, and time; affect appropriate    LABS:                        11.7   10.78 )-----------( 293      ( 14 Sep 2021 17:29 )             37.7     09-15    140  |  109<H>  |  9   ----------------------------<  121<H>  3.9   |  19<L>  |  0.66    Ca    9.1      15 Sep 2021 05:49  Phos  4.2     09-15  Mg     1.9     09-15    TPro  6.5  /  Alb  3.5  /  TBili  0.1<L>  /  DBili  x   /  AST  8<L>  /  ALT  10  /  AlkPhos  62  09-15    PT/INR - ( 14 Sep 2021 17:29 )   PT: 12.2 sec;   INR: 1.02 ratio         PTT - ( 14 Sep 2021 17:29 )  PTT:28.1 sec

## 2021-09-15 NOTE — PROGRESS NOTE ADULT - ASSESSMENT
Patient is a 62yo F with PMH of PE on xarelto, DM, HTN, COPD, schizoaffective disorder, Bipolar 1, PTSD, recurrent C diff infections, recurrent pancreatitis, ?IBS, ?ulcerative colitis presenting with abdominal pain, diarrhea, and rectal bleeding.  Patient is a 60yo F with PMH of PE on xarelto, DM, HTN, COPD, schizoaffective disorder, Bipolar 1, PTSD, recurrent C diff infections, recurrent pancreatitis, ?IBS, ?ulcerative colitis presenting with abdominal pain, diarrhea, and rectal bleeding. Pending C.diff studies. Pending ID evaluation.

## 2021-09-15 NOTE — PHYSICAL THERAPY INITIAL EVALUATION ADULT - GENERAL OBSERVATIONS, REHAB EVAL
Received pt sitting EOB, +IVL, NAD. Pt endorsing 9/10 B knee and rectal pain, RN Jaylyn made aware. Pt amenable to PT at this time. Contact precautions maintained t/o 2/2 r/o C.diff.

## 2021-09-15 NOTE — PROGRESS NOTE ADULT - PROBLEM SELECTOR PLAN 4
- patient takes bisoprolol 10mg BID at home  - converted to metoprolol 200mg daily per EMR conversion - patient takes bisoprolol 10mg BID at home  - called pharmacy to continue home meds - patient takes bisoprolol 10mg BID at home  - called pharmacy to continue home meds, hold if HR <60 or SBP <100 - A1C: 6.1%  - c/w SSI   - patient's leg pain likely related to diabetic neuropathy  - hold patient's home miglitol

## 2021-09-15 NOTE — CONSULT NOTE ADULT - ASSESSMENT
Requesting FMT for recurrent episodes of C diff colitis since 2018.   I question this diagnosis.   She's been on Fidaxomicin since July and reports it's not working which would be unusual.   Also reports IBS and UC which can confound the clinical picture.   I also question her list of drug allergies given her psychiatric history and [place] care [place].   Nothing urgent to do now - not systemically ill, abdomen soft, CT unremarkable.   GI PCR negative.   C diff EIA pending.   If she does have C diff, I would favor a few days of Fidaxomicin (she brought her own) followed by FMT.   GI evaluation for other causes. Reports no prior colonoscopy which is surprising.     Discussed with medicine     Royce Ness MD   Infectious Disease   Pager [phone]   After 5PM and on weekends please page fellow on call or call [phone]

## 2021-09-15 NOTE — PROGRESS NOTE ADULT - PROBLEM SELECTOR PLAN 1
- possibly related to colitis in setting of C.diff, also could be having hemorrhoids bleeding in addition to diarrhea  - C.diff ordered  - GI PCR ordered  - stool culture ordered  - stool count and fecal occult ordered  - given patient is allergic to PO vancomycin and flagyl, and seems to be failing treatment with fidaxomicin, consider ID consult for assistance in treatment for C.diff once studies come back  - consider allergy consult outpatient to test patient's allergies to various antibiotics  - GI consult to determine if fecal transplant would be possible in hospital  - contact precautions - possibly related to colitis in setting of C.diff, also could be having hemorrhoids bleeding in addition to diarrhea  - FU C.diff, GI PCR  - FU stool culture, stool count   + fecal occult (positive)  - given patient is allergic to PO vancomycin and flagyl, and seems to be failing treatment with fidaxomicin, consider ID consult for assistance in treatment for C.diff once studies come back  - ID consulted   - Outpt allergy consult to test patient's allergies to various antibiotics  - GI consulted, referred to ID   - contact precautions - possibly related to colitis in setting of C.diff, also could be having hemorrhoids bleeding in addition to diarrhea  - FU C.diff, GI PCR  - FU stool culture, stool count   + fecal occult (positive)  - given patient is allergic to PO vancomycin and flagyl, and seems to be failing treatment with fidaxomicin, consider ID consult for assistance in treatment for C.diff once studies come back  - ID consulted, less likely C.diff (no leukocytosis, Cr<1.5, pending c.diff)  - Outpt allergy consult to test patient's allergies to various antibiotics  - GI consulted  - contact precautions - possibly related to colitis in setting of C.diff, also could be having hemorrhoids bleeding in addition to diarrhea  - FU C.diff, GI PCR, stool culture, stool count,   + fecal occult (positive)  - ID consulted, need to confirm c.diff, complicated w/hx of IBS/UC, continue Fidaxomicin  - Outpt allergy consult to test patient's allergies to various antibiotics  - GI consulted  - contact precautions

## 2021-09-15 NOTE — PROGRESS NOTE ADULT - PROBLEM SELECTOR PLAN 7
- continue rexulti 1mg daily, ordered through pharmacy  - continue prazosin 2mg nightly  - hx of bipolar 1, schizoaffective disorder, and PTSD - continue Breo 200-25  - patient takes dexamethasone 1mg daily, can hold if concern for worsening infection
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
## 2021-09-15 NOTE — PROGRESS NOTE ADULT - PROBLEM SELECTOR PLAN 2
- more likely related to acute on chronic diarrhea more likely due to c.diff, IBS also possible in setting of psychiatric history, or IBD  - check lipase to r/o pancreatitis as patient has hx of recurrent pancreatitis  - PRN tylenol for pain  - patient takes alosetron 1mg BID at home, hold for now, not on hospital formulary - H/O recurrent c.diff   - Follows  ___ in [facility]  - Traveled to [place], [place], and other states for FMT evaluation  - Scheduled for FMT in [place] on October 13/14  - Requesting FMT for recurrent episodes of C diff colitis since 2018  - Currently on Fidaxomicin, but no improvement, filled Rx in July (allergic to vanc/flagyl)  - p/w bloody diarrhea w/hx of IBS/UC  - Non-Urgent per ID- not systemically ill, abdomen soft, CT unremarkable  - C diff EIA pending, GI PCR negative  - GI consult, for bloody diarrhea w/hx UC/IBS (no known hx of colonoscopy)

## 2021-09-15 NOTE — PROGRESS NOTE ADULT - PROBLEM SELECTOR PLAN 3
- SSI   - hold patient's home miglitol  - patient's leg pain likely related to diabetic neuropathy  - check A1C - A1C: 6.1%  - c/w SSI   - patient's leg pain likely related to diabetic neuropathy  - hold patient's home miglitol - more likely related to acute on chronic diarrhea more likely due to c.diff, IBS also possible in setting of psychiatric history, or IBD  - check lipase to r/o pancreatitis as patient has hx of recurrent pancreatitis  - PRN tylenol for pain  - patient takes alosetron 1mg BID at home, hold for now, not on hospital formulary

## 2021-09-15 NOTE — PHYSICAL THERAPY INITIAL EVALUATION ADULT - PRECAUTIONS/LIMITATIONS, REHAB EVAL
She states the rectal bleeding started 2 days ago. She notes bright red blood when she uses toilet paper. She has also been having abdominal pain and diffuse tenderness. She also associates these symptoms with decreased PO intake and nausea. Patient notes that she is also having rectal pain due to the frequency of diarrhea. Pain is severe enough where she cannot even walk at times. She was recently planned for fecal transplant on 9/9 but it got cancelled due to insurance issues. IMAGING: CT A/P: No acute intra-abdominal pathology.

## 2021-09-15 NOTE — PHYSICAL THERAPY INITIAL EVALUATION ADULT - ACTIVE RANGE OF MOTION EXAMINATION, REHAB EVAL
*Pain during B knee ext and hip flx AROM*/bilateral upper extremity Active ROM was WFL (within functional limits)/bilateral  lower extremity Active ROM was WFL (within functional limits)

## 2021-09-15 NOTE — PROGRESS NOTE ADULT - PROBLEM SELECTOR PLAN 9
- dvt ppx: home xarelto  - dispo: admit to medicine for diarrhea/rectal bleed work-up  - carb consistent diet with Ensures  - PT consult ordered - chronic back pain   - morphine prn

## 2021-09-15 NOTE — PHYSICAL THERAPY INITIAL EVALUATION ADULT - PERTINENT HX OF CURRENT PROBLEM, REHAB EVAL
Pt is a 61y F with PMH of PE on xarelto, DM, HTN, COPD, schizoaffective disorder, Bipolar 1, PTSD, recurrent C diff infections, recurrent pancreatitis, ?IBS, ?ulcerative colitis. P/w abdominal pain, diarrhea, and rectal bleeding. Patient notes that she has had diarrhea over the past week with worsening over the past 3-4 days. She also endorses incontinence due to the diarrhea.

## 2021-09-15 NOTE — PROGRESS NOTE ADULT - PROBLEM SELECTOR PLAN 6
- continue Breo 200-25  - patient takes dexamethasone 1mg daily, can hold if concern for worsening infection - continue home xarelto 20mg daily

## 2021-09-15 NOTE — PROGRESS NOTE ADULT - PROBLEM SELECTOR PLAN 8
- dvt ppx: home xarelto  - dispo: admit to medicine for diarrhea/rectal bleed work-up  - carb consistent diet with Ensures  - PT consult ordered - chronic back pain   - morphine prn - continue rexulti 1mg daily, ordered through pharmacy  - continue prazosin 2mg nightly  - hx of bipolar 1, schizoaffective disorder, and PTSD

## 2021-09-15 NOTE — PROGRESS NOTE ADULT - PROBLEM SELECTOR PLAN 5
- continue home xarelto 20mg daily - patient takes bisoprolol 10mg BID at home  - called pharmacy to continue home meds, hold if HR <60 or SBP <100

## 2021-09-15 NOTE — PROGRESS NOTE ADULT - ATTENDING COMMENTS
above plans discussed with Dr. Mojica    # recurrent/refractory c.diff colitis?  # allergy to vancomycin, flagyl  # bloody diarrhea, abdominal pain  # bipolar disorder  # PE on xarelto    - pt came to ED due to recurrent bloody diarrhea, abdominal pain  - has been closely following with GI for refractory c.diff and planned for fecal transplant but has been delayed 2/2 insurance issues  - on admission, pt does NOT meet sepsis criteria; CTAP without signs of colitis  - pt reports that she cannot tolerate either vanco or flagyl  - ID consulted for possible fecal transplant as inpatient  - check c.diff toxin/pcr, GI PCR, stool culture  - H/H stable, no signs of bleeding other than pt reporting bloody diarrhea; continue Xarelto  - pain control  - pt already has appt in [place] for fecal transplant procedure on Oct 13th    Radha Galvez MD  Division of Hospital Medicine  Cell: [phone]  Office: [phone] above plans discussed with Dr. Mojica    # recurrent/refractory c.diff colitis?  # allergy to vancomycin, flagyl  # bloody diarrhea, abdominal pain  # bipolar disorder  # PE on xarelto    - pt came to ED due to recurrent bloody diarrhea, abdominal pain  - has been closely following with GI for refractory c.diff and planned for fecal transplant but has been delayed 2/2 insurance issues  - on admission, pt does NOT meet sepsis criteria; CTAP without signs of colitis  - pt reports that she cannot tolerate either vanco or flagyl  - ID consulted for possible fecal transplant as inpatient: care discussed with Dr. Ness  - check c.diff toxin/pcr, GI PCR, stool culture  - H/H stable, no signs of bleeding other than pt reporting bloody diarrhea; continue Xarelto  - pain control  - pt already has appt in [place] for fecal transplant procedure on Oct 13th    Radha Galvez MD  Division of Hospital Medicine  Cell: [phone]  Office: [phone]

## 2021-09-16 DIAGNOSIS — Z86.19 PERSONAL HISTORY OF OTHER INFECTIOUS AND PARASITIC DISEASES: ICD-10-CM

## 2021-09-16 LAB
ALBUMIN SERPL ELPH-MCNC: 3.9 G/DL — SIGNIFICANT CHANGE UP (ref 3.3–5)
ALP SERPL-CCNC: 59 U/L — SIGNIFICANT CHANGE UP (ref 40–120)
ALT FLD-CCNC: 14 U/L — SIGNIFICANT CHANGE UP (ref 10–45)
ANION GAP SERPL CALC-SCNC: 13 MMOL/L — SIGNIFICANT CHANGE UP (ref 5–17)
AST SERPL-CCNC: 11 U/L — SIGNIFICANT CHANGE UP (ref 10–40)
BILIRUB SERPL-MCNC: <0.1 MG/DL — LOW (ref 0.2–1.2)
BUN SERPL-MCNC: 17 MG/DL — SIGNIFICANT CHANGE UP (ref 7–23)
CALCIUM SERPL-MCNC: 8.8 MG/DL — SIGNIFICANT CHANGE UP (ref 8.4–10.5)
CHLORIDE SERPL-SCNC: 107 MMOL/L — SIGNIFICANT CHANGE UP (ref 96–108)
CO2 SERPL-SCNC: 19 MMOL/L — LOW (ref 22–31)
CREAT SERPL-MCNC: 0.56 MG/DL — SIGNIFICANT CHANGE UP (ref 0.5–1.3)
GLUCOSE SERPL-MCNC: 93 MG/DL — SIGNIFICANT CHANGE UP (ref 70–99)
HCT VFR BLD CALC: 35.1 % — SIGNIFICANT CHANGE UP (ref 34.5–45)
HCV AB S/CO SERPL IA: 8.95 S/CO — HIGH (ref 0–0.99)
HCV AB SERPL-IMP: REACTIVE
HGB BLD-MCNC: 11.2 G/DL — LOW (ref 11.5–15.5)
MAGNESIUM SERPL-MCNC: 1.8 MG/DL — SIGNIFICANT CHANGE UP (ref 1.6–2.6)
MCHC RBC-ENTMCNC: 30.4 PG — SIGNIFICANT CHANGE UP (ref 27–34)
MCHC RBC-ENTMCNC: 31.9 GM/DL — LOW (ref 32–36)
MCV RBC AUTO: 95.1 FL — SIGNIFICANT CHANGE UP (ref 80–100)
NRBC # BLD: 0 /100 WBCS — SIGNIFICANT CHANGE UP (ref 0–0)
PHOSPHATE SERPL-MCNC: 4.3 MG/DL — SIGNIFICANT CHANGE UP (ref 2.5–4.5)
PLATELET # BLD AUTO: 263 K/UL — SIGNIFICANT CHANGE UP (ref 150–400)
POTASSIUM SERPL-MCNC: 3.9 MMOL/L — SIGNIFICANT CHANGE UP (ref 3.5–5.3)
POTASSIUM SERPL-SCNC: 3.9 MMOL/L — SIGNIFICANT CHANGE UP (ref 3.5–5.3)
PROT SERPL-MCNC: 6.9 G/DL — SIGNIFICANT CHANGE UP (ref 6–8.3)
RBC # BLD: 3.69 M/UL — LOW (ref 3.8–5.2)
RBC # FLD: 15 % — HIGH (ref 10.3–14.5)
SODIUM SERPL-SCNC: 139 MMOL/L — SIGNIFICANT CHANGE UP (ref 135–145)
WBC # BLD: 11.29 K/UL — HIGH (ref 3.8–10.5)
WBC # FLD AUTO: 11.29 K/UL — HIGH (ref 3.8–10.5)

## 2021-09-16 PROCEDURE — 99232 SBSQ HOSP IP/OBS MODERATE 35: CPT

## 2021-09-16 PROCEDURE — 99222 1ST HOSP IP/OBS MODERATE 55: CPT

## 2021-09-16 PROCEDURE — 99233 SBSQ HOSP IP/OBS HIGH 50: CPT | Mod: GC

## 2021-09-16 RX ORDER — ZINC OXIDE 200 MG/G
1 OINTMENT TOPICAL DAILY
Refills: 0 | Status: DISCONTINUED | OUTPATIENT
Start: 2021-09-16 | End: 2021-09-17

## 2021-09-16 RX ADMIN — BISOPROLOL FUMARATE 10 MILLIGRAM(S): 10 TABLET, FILM COATED ORAL at 05:10

## 2021-09-16 RX ADMIN — BUDESONIDE AND FORMOTEROL FUMARATE DIHYDRATE 2 PUFF(S): 160; 4.5 AEROSOL RESPIRATORY (INHALATION) at 05:10

## 2021-09-16 RX ADMIN — MORPHINE SULFATE 1 MILLIGRAM(S): 50 CAPSULE, EXTENDED RELEASE ORAL at 17:15

## 2021-09-16 RX ADMIN — MORPHINE SULFATE 1 MILLIGRAM(S): 50 CAPSULE, EXTENDED RELEASE ORAL at 08:58

## 2021-09-16 RX ADMIN — MORPHINE SULFATE 1 MILLIGRAM(S): 50 CAPSULE, EXTENDED RELEASE ORAL at 01:55

## 2021-09-16 RX ADMIN — MORPHINE SULFATE 1 MILLIGRAM(S): 50 CAPSULE, EXTENDED RELEASE ORAL at 23:00

## 2021-09-16 RX ADMIN — MORPHINE SULFATE 1 MILLIGRAM(S): 50 CAPSULE, EXTENDED RELEASE ORAL at 09:15

## 2021-09-16 RX ADMIN — ZINC OXIDE 1 APPLICATION(S): 200 OINTMENT TOPICAL at 10:05

## 2021-09-16 RX ADMIN — Medication 2 MILLIGRAM(S): at 22:05

## 2021-09-16 RX ADMIN — Medication 650 MILLIGRAM(S): at 12:51

## 2021-09-16 RX ADMIN — Medication 1 MILLIGRAM(S): at 05:10

## 2021-09-16 RX ADMIN — Medication 650 MILLIGRAM(S): at 20:30

## 2021-09-16 RX ADMIN — Medication 650 MILLIGRAM(S): at 19:47

## 2021-09-16 RX ADMIN — MORPHINE SULFATE 1 MILLIGRAM(S): 50 CAPSULE, EXTENDED RELEASE ORAL at 16:56

## 2021-09-16 RX ADMIN — ZINC OXIDE 1 APPLICATION(S): 200 OINTMENT TOPICAL at 12:49

## 2021-09-16 RX ADMIN — Medication 10 MILLIGRAM(S): at 16:56

## 2021-09-16 RX ADMIN — Medication 10 MILLIGRAM(S): at 05:09

## 2021-09-16 RX ADMIN — BREXPIPRAZOLE 1 MILLIGRAM(S): 0.25 TABLET ORAL at 12:48

## 2021-09-16 RX ADMIN — Medication 650 MILLIGRAM(S): at 05:50

## 2021-09-16 RX ADMIN — Medication 650 MILLIGRAM(S): at 13:30

## 2021-09-16 RX ADMIN — BISOPROLOL FUMARATE 10 MILLIGRAM(S): 10 TABLET, FILM COATED ORAL at 16:56

## 2021-09-16 RX ADMIN — BUDESONIDE AND FORMOTEROL FUMARATE DIHYDRATE 2 PUFF(S): 160; 4.5 AEROSOL RESPIRATORY (INHALATION) at 16:56

## 2021-09-16 RX ADMIN — ONDANSETRON 4 MILLIGRAM(S): 8 TABLET, FILM COATED ORAL at 22:06

## 2021-09-16 RX ADMIN — Medication 650 MILLIGRAM(S): at 05:12

## 2021-09-16 RX ADMIN — RIVAROXABAN 20 MILLIGRAM(S): KIT at 22:05

## 2021-09-16 RX ADMIN — MORPHINE SULFATE 1 MILLIGRAM(S): 50 CAPSULE, EXTENDED RELEASE ORAL at 02:20

## 2021-09-16 RX ADMIN — MORPHINE SULFATE 1 MILLIGRAM(S): 50 CAPSULE, EXTENDED RELEASE ORAL at 22:13

## 2021-09-16 RX ADMIN — Medication 0: at 22:05

## 2021-09-16 NOTE — CONSULT NOTE ADULT - SUBJECTIVE AND OBJECTIVE BOX
HPI:  61F with psychiatric history admitted for diarrhea since June.   Reports history of IBS and Ulcerative colitis and was diagnosed with Clostridioides difficile colitis in 2018.   Reports recurrent C diff infections since then despite Fidaxomicin, has seen multiple doctors around the country and was scheduled for FMT in [place] next month but couldn't wait.   Lived in [place], went to [place] to be with her sick mother and now is in [place].   Fidaxomicin was started end of July and refilled again, she has the bottle with her.   Vancomycin causes swelling of her arms and legs and a severe rash.   She says "all other antibiotics" cause rash too on her arms and swelling. Flagyl, Penicillin, Macrolides, Clindamycin are listed. Acyclovir too.   Today she's had 2-3 bowel movements.   Some blood in stool and abdominal pain.   No fevers.     PAST MEDICAL & SURGICAL HISTORY:  Recurrent pancreatitis  Recurrent Clostridioides difficile infection  H/O: HTN (hypertension)  Bipolar 1 disorder  Schizoaffective disorder  PTSD (post-traumatic stress disorder)  S/P hysterectomy  COPD       Allergies    acyclovir (Swelling; Pruritus)  azithromycin (Rash)  Bupap (Swelling; Rash)  clindamycin (Rash)  erythromycin (Anaphylaxis; Rash; Swelling; Pruritus)  fentanyl (Hives)  Flagyl (Rash; Swelling; Pruritus; Hives)  penicillin (Pruritus; Rash)  pioglitazone (Vomiting; Rash; Nausea)  vancomycin (Swelling; Pruritus)    Intolerances        ANTIMICROBIALS:      OTHER MEDS:  acetaminophen   Tablet .. 650 milliGRAM(s) Oral every 6 hours PRN  bisoprolol   Tablet 10 milliGRAM(s) Oral <User Schedule>  brexpiprazole 1 milliGRAM(s) Oral daily  budesonide 160 MICROgram(s)/formoterol 4.5 MICROgram(s) Inhaler 2 Puff(s) Inhalation two times a day  dexAMETHasone     Tablet 1 milliGRAM(s) Oral daily  dextrose 40% Gel 15 Gram(s) Oral once  dextrose 5%. 1000 milliLiter(s) IV Continuous <Continuous>  dextrose 5%. 1000 milliLiter(s) IV Continuous <Continuous>  dextrose 50% Injectable 25 Gram(s) IV Push once  dextrose 50% Injectable 12.5 Gram(s) IV Push once  dextrose 50% Injectable 25 Gram(s) IV Push once  doxazosin 2 milliGRAM(s) Oral at bedtime  glucagon  Injectable 1 milliGRAM(s) IntraMuscular once  influenza   Vaccine 0.5 milliLiter(s) IntraMuscular once  insulin lispro (ADMELOG) corrective regimen sliding scale   SubCutaneous three times a day before meals  insulin lispro (ADMELOG) corrective regimen sliding scale   SubCutaneous at bedtime  morphine  - Injectable 1 milliGRAM(s) IV Push every 6 hours PRN  ondansetron Injectable 4 milliGRAM(s) IV Push once PRN  oxybutynin 10 milliGRAM(s) Oral every 12 hours  rivaroxaban 20 milliGRAM(s) Oral <User Schedule>      SOCIAL HISTORY: Former smoker     FAMILY HISTORY:  FH: GI cancer (Grandparent, Aunt)    ROS:  All other systems negative   Constitutional: no fever   Eye: no vision changes  ENT: no sore throat, no rhinorrhea  Cardiovascular: no chest pain  Respiratory: no SOB  GI:  per HPI   urinary: no dysuria, no hematuria, no flank pain  musculoskeletal: chronic joint pains from arthritis   skin: no rash  neurology: no headache  psych: no anxiety    Physical Exam:  General: awake, alert, non toxic, overweight   Head: atraumatic, normocephalic  Eyes: normal sclera and conjunctiva  ENT: neck supple  Cardio: regular rate   Respiratory: nonlabored on room air  abd: soft, reports tenderness but no guarding   : no wright  Musculoskeletal: no joint swelling, no edema  Skin: no rash  vascular: no phlebitis  Neurologic: no focal deficits  psych: normal affect       Drug Dosing Weight  Height (cm): 167.6 (14 Sep 2021 16:26)  Weight (kg): 72.6 (14 Sep 2021 16:26)  BMI (kg/m2): 25.8 (14 Sep 2021 16:26)  BSA (m2): 1.82 (14 Sep 2021 16:26)    Vital Signs Last 24 Hrs  T(F): 98 (09-15-21 @ 11:46), Max: 98.7 (09-15-21 @ 00:32)    Vital Signs Last 24 Hrs  HR: 66 (09-15-21 @ 11:46) (64 - 78)  BP: 116/62 (09-15-21 @ 11:46) (113/60 - 148/68)  RR: 18 (09-15-21 @ 11:46)  SpO2: 98% (09-15-21 @ 11:46) (97% - 99%)  Wt(kg): --                          11.7   10.77 )-----------( 246      ( 15 Sep 2021 09:06 )             37.1       09-15    140  |  109<H>  |  9   ----------------------------<  121<H>  3.9   |  19<L>  |  0.66    Ca    9.1      15 Sep 2021 05:49  Phos  4.2     09-15  Mg     1.9     09-15    TPro  6.5  /  Alb  3.5  /  TBili  0.1<L>  /  DBili  x   /  AST  8<L>  /  ALT  10  /  AlkPhos  62  09-15          MICROBIOLOGY:  Blood and stool cultures in lab     GI PCR Panel, Stool (collected 09-15-21 @ 08:39)  Source: .Stool Feces  Final Report (09-15-21 @ 13:03):    GI PCR Results: NOT detected    *******Please Note:*******    GI panel PCR evaluates for:    Campylobacter, Plesiomonas shigelloides, Salmonella,    Vibrio, Yersinia enterocolitica, Enteroaggregative    Escherichia coli (EAEC), Enteropathogenic E.coli (EPEC),    Enterotoxigenic E. coli (ETEC) lt/st, Shiga-like    toxin-producing E. coli (STEC) stx1/stx2,    Shigella/ Enteroinvasive E. coli (EIEC), Cryptosporidium,    Cyclospora cayetanensis, Entamoeba histolytica,    Giardia lamblia, Adenovirus F 40/41, Astrovirus,    Norovirus GI/GII, Rotavirus A, Sapovirus      RADIOLOGY:  Images below reviewed personally  CT Abdomen and Pelvis w/ IV Cont (09.14.21 @ 20:25)   No acute intra-abdominal pathology.  
Contact Information:  Fahad Muniz II, MD, MPH  PGY-3, Internal Medicine  Pager: [phone] ([facility]) /// [phone] ([facility])    DOREEN EMERY, MRN-[id number]    Patient is a 61y old  Female who presents with a chief complaint of Rectal bleeding (16 Sep 2021 07:45)      HPI/INTERVAL EVENTS: Please note that patient is a very unreliable historian. 61F PMHx PE on Xarelto, T2DM, HTN, COPD, schizoaffective disorder, bipolar 1 disorder, PTSD, recurrent C. difficile, recurrent pancreatitis, questionable history of IBS and ulcerative colitis presents for complaints of ABD pain, diarrhea, and rectal bleeding. Per patient records present with her, she has had previous hospitalizations with C. diff; she was started on fidaxomicin in late July for a short course. However, she states that she has been having recurrent symptoms, specifically alternating watery and soft movements about 6 times per day and severe abdominal pain. She says that her outpatient GI doctor Dr. Fong states that she needs to have a fecal microbiota transplant; according to her, she was scheduled for transplantation on 9/9, but her insurance company did not authorize the procedure.    In the emergency department, she was noted to have stable hemoglobin in the 11 range. She produced a stool sample that was not suitable for C. diff testing due to semisolid consistency. GI PCR was negative and stool culture pending. CT scan of abdomen and pelvis demonstrated diverticulosis, a contracted gallbladder, and a small intrahepatic hypodensity; no other abnormalities were noted. Thus far, no stool sample has been visualized since her admission.    Of note, patient states that she was diagnosed with ulcerative colitis in 2019 in [place], though she does not remember ever having a colonoscopy; she is currently on mesalamine therapy. She states that she has had flares chronically since then. She also says that she suffers from chronic pancreatitis, the etiology of which she cannot provide. Aside from diarrhea with blood and severe abdominal pain, the rest of review of systems is negative as noted below.    PAST MEDICAL & SURGICAL HISTORY:  Pulmonary embolism  2020    Recurrent pancreatitis    Recurrent Clostridioides difficile infection    H/O: HTN (hypertension)    Bipolar 1 disorder    Schizoaffective disorder    PTSD (post-traumatic stress disorder)    S/P hysterectomy        Home Medications:  alosetron 1 mg oral tablet: 1 tab(s) orally 2 times a day (15 Sep 2021 04:08)  bisoprolol 10 mg oral tablet: orally 2 times a day (15 Sep 2021 04:11)  Breo Ellipta 200 mcg-25 mcg/inh inhalation powder: 1 puff(s) inhaled once a day (15 Sep 2021 04:11)  dexamethasone 1 mg oral tablet: orally once a day (15 Sep 2021 04:09)  Dificid 200 mg oral tablet: orally 3 times a day (15 Sep 2021 04:10)  estradiol 1 mg oral tablet: 1 tab(s) orally once a day (15 Sep 2021 04:04)  mesalamine 1.2 g oral delayed release tablet: orally 2 times a day (15 Sep 2021 04:05)  miglitol 25 mg oral tablet: orally 2 times a day (15 Sep 2021 04:05)  omeprazole 40 mg oral delayed release capsule: 1 cap(s) orally once a day (15 Sep 2021 04:02)  ondansetron 4 mg oral tablet: orally 2 times a day, As Needed (15 Sep 2021 04:04)  oxybutynin extended release: 10 milligram(s) orally 2 times a day (15 Sep 2021 04:06)  prazosin 2 mg oral capsule: orally once a day (at bedtime) (15 Sep 2021 04:03)  Rexulti 1 mg oral tablet: 1 tab(s) orally once a day (15 Sep 2021 04:08)  Xarelto 20 mg oral tablet: 1 tab(s) orally once a day (in the evening) (15 Sep 2021 04:08)      Allergies    acyclovir (Swelling; Pruritus)  azithromycin (Rash)  Bupap (Swelling; Rash)  clindamycin (Rash)  erythromycin (Anaphylaxis; Rash; Swelling; Pruritus)  fentanyl (Hives)  Flagyl (Rash; Swelling; Pruritus; Hives)  penicillin (Pruritus; Rash)  pioglitazone (Vomiting; Rash; Nausea)  vancomycin (Swelling; Pruritus)    Intolerances        FAMILY HISTORY:  FH: GI cancer (Grandparent, Aunt)        Social History:  Patient lives alone in apartment. She denies smoking, drinking alcohol, illicit drug use. She notes that she lives in an apartment with a lot of second-hand smoke. She previously used to work as a  but is currently on disability due to c.diff and pain. 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 (14 Sep 2021 21:58)      CONSTITUTIONAL: No weakness. No fatigue. No fever.  HEAD: No head trauma.   EYES: No vision changes.  ENT: No hearing changes or tinnitus. No ear pain. No changes in smell. No nasal congestion or discharge. No sore throat. No voice hoarseness.   NECK: No neck pain or stiffness. No lumps.  RESPIRATORY: No cough. No SOB. No wheezing. No hemoptysis.   CARDIOVASCULAR: No chest pain. No palpitations.   GASTROINTESTINAL: +Soft/liquid stools with blood. +Abdominal pain. No dysphagia. No distension. No constipation. No pain with defecation. No hematemesis. No melena.  BACK: No back pain.  GENITOURINARY: No dysuria. No frequency or urgency. No hesitancy. No incontinence. No urinary retention. No suprapubic pain. No hematuria.  EXTREMITY: No swelling.  MUSCULOSKELETAL: No joint pain or swelling. No fractures. No stiffness.    SKIN: No rashes. No itching. No skin, hair, or nail changes.  NEUROLOGICAL: No weakness or paralysis. No lightheadedness or dizziness. No HA. No numbness or tingling.   PSYCHIATRIC: No depression.       OBJECTIVE:  Vital Signs Last 24 Hrs  T(C): 36.6 (16 Sep 2021 09:58), Max: 36.7 (15 Sep 2021 18:30)  T(F): 97.9 (16 Sep 2021 09:58), Max: 98 (15 Sep 2021 18:30)  HR: 63 (16 Sep 2021 09:58) (62 - 70)  BP: 101/67 (16 Sep 2021 09:58) (101/67 - 124/73)  BP(mean): --  RR: 18 (16 Sep 2021 09:58) (18 - 18)  SpO2: 95% (16 Sep 2021 09:58) (95% - 100%)  I&O's Summary    16 Sep 2021 07:01  -  16 Sep 2021 13:18  --------------------------------------------------------  IN: 320 mL / OUT: 0 mL / NET: 320 mL        MEDICATIONS  (STANDING):  bisoprolol   Tablet 10 milliGRAM(s) Oral <User Schedule>  brexpiprazole 1 milliGRAM(s) Oral daily  budesonide 160 MICROgram(s)/formoterol 4.5 MICROgram(s) Inhaler 2 Puff(s) Inhalation two times a day  dexAMETHasone     Tablet 1 milliGRAM(s) Oral daily  dextrose 40% Gel 15 Gram(s) Oral once  dextrose 5%. 1000 milliLiter(s) (50 mL/Hr) IV Continuous <Continuous>  dextrose 5%. 1000 milliLiter(s) (100 mL/Hr) IV Continuous <Continuous>  dextrose 50% Injectable 25 Gram(s) IV Push once  dextrose 50% Injectable 12.5 Gram(s) IV Push once  dextrose 50% Injectable 25 Gram(s) IV Push once  doxazosin 2 milliGRAM(s) Oral at bedtime  glucagon  Injectable 1 milliGRAM(s) IntraMuscular once  influenza   Vaccine 0.5 milliLiter(s) IntraMuscular once  insulin lispro (ADMELOG) corrective regimen sliding scale   SubCutaneous three times a day before meals  insulin lispro (ADMELOG) corrective regimen sliding scale   SubCutaneous at bedtime  oxybutynin 10 milliGRAM(s) Oral every 12 hours  rivaroxaban 20 milliGRAM(s) Oral <User Schedule>  zinc oxide 40% Ointment 1 Application(s) Topical daily    MEDICATIONS  (PRN):  acetaminophen   Tablet .. 650 milliGRAM(s) Oral every 6 hours PRN Mild Pain (1 - 3), Moderate Pain (4 - 6)  morphine  - Injectable 1 milliGRAM(s) IV Push every 6 hours PRN Severe Pain (7 - 10)  ondansetron Injectable 4 milliGRAM(s) IV Push once PRN Nausea and/or Vomiting    Allergies    acyclovir (Swelling; Pruritus)  azithromycin (Rash)  Bupap (Swelling; Rash)  clindamycin (Rash)  erythromycin (Anaphylaxis; Rash; Swelling; Pruritus)  fentanyl (Hives)  Flagyl (Rash; Swelling; Pruritus; Hives)  penicillin (Pruritus; Rash)  pioglitazone (Vomiting; Rash; Nausea)  vancomycin (Swelling; Pruritus)    Intolerances        CONSTITUTIONAL: No acute distress. Awake and alert.  RESPIRATORY: CTAB. No wheezes, rales, or rhonchi. No accessory muscle use. No apparent respiratory distress.  CARDIOVASCULAR: +S1/S2. No audible S3/S4. Regular rate and rhythm. No murmurs, rubs, or gallops.   GASTROINTESTINAL: Soft, nondistended, tender to palpation ine epigastrium and the lower central abdomen. +BS. No rebound or guarding.   EXTREMITY: No LE swelling or edema. EXTs warm to touch.  MUSCULOSKELETAL: Spontaneous movement in all extremities.  DERMATOLOGICAL: No abnormal rashes or lesions.  NEUROLOGICAL: No focal deficits. A&Ox3 (oriented to person, place, and time).                            11.7   10.77 )-----------( 246      ( 15 Sep 2021 09:06 )             37.1     PT/INR - ( 14 Sep 2021 17:29 )   PT: 12.2 sec;   INR: 1.02 ratio         PTT - ( 14 Sep 2021 17:29 )  PTT:28.1 sec  09-15    140  |  109<H>  |  9   ----------------------------<  121<H>  3.9   |  19<L>  |  0.66    Ca    9.1      15 Sep 2021 05:49  Phos  4.2     09-15  Mg     1.9     09-15    TPro  6.5  /  Alb  3.5  /  TBili  0.1<L>  /  DBili  x   /  AST  8<L>  /  ALT  10  /  AlkPhos  62  09-15    CAPILLARY BLOOD GLUCOSE      POCT Blood Glucose.: 104 mg/dL (16 Sep 2021 11:40)  POCT Blood Glucose.: 79 mg/dL (16 Sep 2021 07:39)  POCT Blood Glucose.: 134 mg/dL (15 Sep 2021 21:20)  POCT Blood Glucose.: 136 mg/dL (15 Sep 2021 17:54)    LIVER FUNCTIONS - ( 15 Sep 2021 05:49 )  Alb: 3.5 g/dL / Pro: 6.5 g/dL / ALK PHOS: 62 U/L / ALT: 10 U/L / AST: 8 U/L / GGT: x                   Culture - Stool (collected 15 Sep 2021 08:39)  Source: .Stool Feces  Preliminary Report (16 Sep 2021 12:21):    No enteric pathogens to date: Final culture pending    GI PCR Panel, Stool (collected 15 Sep 2021 08:39)  Source: .Stool Feces  Final Report (15 Sep 2021 13:03):    GI PCR Results: NOT detected    *******Please Note:*******    GI panel PCR evaluates for:    Campylobacter, Plesiomonas shigelloides, Salmonella,    Vibrio, Yersinia enterocolitica, Enteroaggregative    Escherichia coli (EAEC), Enteropathogenic E.coli (EPEC),    Enterotoxigenic E. coli (ETEC) lt/st, Shiga-like    toxin-producing E. coli (STEC) stx1/stx2,    Shigella/ Enteroinvasive E. coli (EIEC), Cryptosporidium,    Cyclospora cayetanensis, Entamoeba histolytica,    Giardia lamblia, Adenovirus F 40/41, Astrovirus,    Norovirus GI/GII, Rotavirus A, Sapovirus    Culture - Blood (collected 14 Sep 2021 20:54)  Source: .Blood Blood-Peripheral  Preliminary Report (15 Sep 2021 21:02):    No growth to date.    Culture - Blood (collected 14 Sep 2021 20:54)  Source: .Blood Blood-Peripheral  Preliminary Report (15 Sep 2021 21:02):    No growth to date.          RADIOLOGY AND ADDITIONAL TESTS:    CONSULTANT NOTES REVIEWED:    CARE DISCUSSED WITH THE FOLLOWING CONSULTANTS/PROVIDERS:

## 2021-09-16 NOTE — PROGRESS NOTE ADULT - ATTENDING COMMENTS
above plans discussed with Dr. Ferrer    # recurrent/refractory c.diff colitis?  # allergy to vancomycin, flagyl  # bloody diarrhea, abdominal pain  # bipolar disorder  # PE on xarelto    - few episodes of BM overnight but formed, no diarrhea; not suitable for C.diff testing  - on admission, pt does NOT meet sepsis criteria; CTAP without signs of colitis  - pt reports that she cannot tolerate either vanco or flagyl, along with allergies to multiple abx and other meds  - ID consulted for possible fecal transplant as inpatient: care discussed with Dr. Ness; resume Fidaxomicin  - fu c.diff toxin/pcr, GI PCR, stool culture  - H/H stable, no signs of bleeding other than pt reporting bloody diarrhea; continue Xarelto  - pain control  - PT consult  - nutrition consult  - pt already has appt in [place] for fecal transplant procedure on Oct 13th    Radha Galvez MD  Division of Hospital Medicine  Cell: [phone]  Office: [phone]

## 2021-09-16 NOTE — PROGRESS NOTE ADULT - SUBJECTIVE AND OBJECTIVE BOX
Patient is a 61y old  Female who presents with a chief complaint of Rectal bleeding (15 Sep 2021 15:49)      SUBJECTIVE / OVERNIGHT EVENTS: No acute events overnight, No     MEDICATIONS  (STANDING):  bisoprolol   Tablet 10 milliGRAM(s) Oral <User Schedule>  brexpiprazole 1 milliGRAM(s) Oral daily  budesonide 160 MICROgram(s)/formoterol 4.5 MICROgram(s) Inhaler 2 Puff(s) Inhalation two times a day  dexAMETHasone     Tablet 1 milliGRAM(s) Oral daily  dextrose 40% Gel 15 Gram(s) Oral once  dextrose 5%. 1000 milliLiter(s) (50 mL/Hr) IV Continuous <Continuous>  dextrose 5%. 1000 milliLiter(s) (100 mL/Hr) IV Continuous <Continuous>  dextrose 50% Injectable 25 Gram(s) IV Push once  dextrose 50% Injectable 12.5 Gram(s) IV Push once  dextrose 50% Injectable 25 Gram(s) IV Push once  doxazosin 2 milliGRAM(s) Oral at bedtime  glucagon  Injectable 1 milliGRAM(s) IntraMuscular once  influenza   Vaccine 0.5 milliLiter(s) IntraMuscular once  insulin lispro (ADMELOG) corrective regimen sliding scale   SubCutaneous three times a day before meals  insulin lispro (ADMELOG) corrective regimen sliding scale   SubCutaneous at bedtime  oxybutynin 10 milliGRAM(s) Oral every 12 hours  rivaroxaban 20 milliGRAM(s) Oral <User Schedule>  zinc oxide 40% Ointment 1 Application(s) Topical daily    MEDICATIONS  (PRN):  acetaminophen   Tablet .. 650 milliGRAM(s) Oral every 6 hours PRN Mild Pain (1 - 3), Moderate Pain (4 - 6)  morphine  - Injectable 1 milliGRAM(s) IV Push every 6 hours PRN Severe Pain (7 - 10)  ondansetron Injectable 4 milliGRAM(s) IV Push once PRN Nausea and/or Vomiting      Vital Signs Last 24 Hrs  T(C): 36.1 (16 Sep 2021 05:06), Max: 36.7 (15 Sep 2021 11:46)  T(F): 96.9 (16 Sep 2021 05:06), Max: 98 (15 Sep 2021 11:46)  HR: 65 (16 Sep 2021 05:06) (62 - 78)  BP: 116/72 (16 Sep 2021 05:06) (116/62 - 138/89)  BP(mean): --  RR: 18 (16 Sep 2021 05:06) (18 - 18)  SpO2: 100% (16 Sep 2021 05:06) (97% - 100%)  CAPILLARY BLOOD GLUCOSE      POCT Blood Glucose.: 79 mg/dL (16 Sep 2021 07:39)  POCT Blood Glucose.: 134 mg/dL (15 Sep 2021 21:20)  POCT Blood Glucose.: 136 mg/dL (15 Sep 2021 17:54)  POCT Blood Glucose.: 95 mg/dL (15 Sep 2021 13:07)  POCT Blood Glucose.: 130 mg/dL (15 Sep 2021 09:15)    I&O's Summary    Physical Exam:   CONSTITUTIONAL: NAD, well-developed  RESPIRATORY: Normal respiratory effort; lungs are clear to auscultation bilaterally  CARDIOVASCULAR: Regular rate and rhythm, normal S1 and S2, no murmur/rub/gallop; No lower extremity edema; Peripheral pulses are 2+ bilaterally  ABDOMEN: Nontender to palpation, normoactive bowel sounds, no rebound/guarding; No hepatosplenomegaly  MUSCLOSKELETAL: L paraspinal ttp, no clubbing or cyanosis of digits; no joint swelling or tenderness to palpation  PSYCH: A+O to person, place, and time; affect appropriate  lesions    LABS:                        11.7   10.77 )-----------( 246      ( 15 Sep 2021 09:06 )             37.1     09-15    140  |  109<H>  |  9   ----------------------------<  121<H>  3.9   |  19<L>  |  0.66    Ca    9.1      15 Sep 2021 05:49  Phos  4.2     09-15  Mg     1.9     09-15    TPro  6.5  /  Alb  3.5  /  TBili  0.1<L>  /  DBili  x   /  AST  8<L>  /  ALT  10  /  AlkPhos  62  09-15    PT/INR - ( 14 Sep 2021 17:29 )   PT: 12.2 sec;   INR: 1.02 ratio         PTT - ( 14 Sep 2021 17:29 )  PTT:28.1 sec          GI PCR Panel, Stool (collected 15 Sep 2021 08:39)  Source: .Stool Feces  Final Report (15 Sep 2021 13:03):    GI PCR Results: NOT detected    *******Please Note:*******    GI panel PCR evaluates for:    Campylobacter, Plesiomonas shigelloides, Salmonella,    Vibrio, Yersinia enterocolitica, Enteroaggregative    Escherichia coli (EAEC), Enteropathogenic E.coli (EPEC),    Enterotoxigenic E. coli (ETEC) lt/st, Shiga-like    toxin-producing E. coli (STEC) stx1/stx2,    Shigella/ Enteroinvasive E. coli (EIEC), Cryptosporidium,    Cyclospora cayetanensis, Entamoeba histolytica,    Giardia lamblia, Adenovirus F 40/41, Astrovirus,    Norovirus GI/GII, Rotavirus A, Sapovirus    Culture - Blood (collected 14 Sep 2021 20:54)  Source: .Blood Blood-Peripheral  Preliminary Report (15 Sep 2021 21:02):    No growth to date.    Culture - Blood (collected 14 Sep 2021 20:54)  Source: .Blood Blood-Peripheral  Preliminary Report (15 Sep 2021 21:02):    No growth to date.

## 2021-09-16 NOTE — PROGRESS NOTE ADULT - PROBLEM SELECTOR PLAN 2
- H/O recurrent c.diff   - Follows  ___ in [place] [facility]  - Traveled to [place], [place], and other [facility] for FMT evaluation  - Scheduled for FMT in [place] on October 13/14  - Requesting FMT for recurrent episodes of C diff colitis since 2018  - Currently on Fidaxomicin, but no improvement, filled Rx in July (allergic to vanc?/flagyl?)  - p/w bloody diarrhea w/hx of IBS/UC  - Non-Urgent per ID- not systemically ill, abdomen soft, CT unremarkable  - C diff EIA pending, GI PCR negative  - GI consult, for bloody diarrhea w/hx UC/IBS (no known hx of colonoscopy)

## 2021-09-16 NOTE — PROGRESS NOTE ADULT - PROBLEM SELECTOR PLAN 7
- continue Breo 200-25  - patient takes dexamethasone 1mg daily, can hold if concern for worsening infection

## 2021-09-16 NOTE — PROGRESS NOTE ADULT - PROBLEM SELECTOR PLAN 1
- possibly related to colitis in setting of C.diff, also could be having hemorrhoids bleeding in addition to diarrhea  - GI PCR negative, pending C.DIff culture   + fecal occult (positive)  - ID consulted, need to confirm c.diff,  questionable medical hx given no response to fidaxomicin?, also has never received colonscopy in past per patient  - GI consulted, pending recs   - contact precautions

## 2021-09-16 NOTE — PROGRESS NOTE ADULT - PROBLEM SELECTOR PLAN 8
- continue rexulti 1mg daily, ordered through pharmacy  - continue prazosin 2mg nightly  - hx of bipolar 1, schizoaffective disorder, and PTSD

## 2021-09-16 NOTE — PROGRESS NOTE ADULT - ASSESSMENT
Requesting FMT for recurrent episodes of C diff colitis since 2018.   I question this diagnosis.   Bowel movements were too formed to send for C diff and actually stopped now.   She's been on Fidaxomicin since July and reports it's not working which would be unusual.   Also reports IBS and UC which can confound the clinical picture but reports no prior colonoscopy.   I also question her list of drug allergies given her psychiatric history and seeking care from providers all over [place].   Nothing urgent to do now - not systemically ill, abdomen soft, CT unremarkable.   GI PCR negative.   Monitor clinically.   GI evaluation for other causes.     Discussed with medicine   Will follow as needed    Royce Ness MD   Infectious Disease   Pager [phone]   After 5PM and on weekends please page fellow on call or call [phone]

## 2021-09-16 NOTE — CONSULT NOTE ADULT - ATTENDING COMMENTS
60 yo F pmh PE on a/c, DM/HTN, COPD, bipolar/schizoaffective, PTSD, ? UC history, ? IBS history, ? recurrent pancreatitis, and ? h/o C diff with recurrence that was reportedly treated with Dificid in late July.  Patient presented to ED with worsening diarrhea and blood in stool.  Since getting to ED - no diarrhea (no bm today yet) and no more blood.  Stable anemia to 11s.  No wbc.  Negative CT scan    Of note, patient's history if complete with many inconsistencies including 'going to [place] for FMT' and 'Diagnosed with UC but never needed a Colonoscopy'.      At this point, would give some time to see what her bowel movements are truly doing.  If profuse diarrhea develops -> agree with resending to CT  Would get collateral information from her physicians regarding prior C Diff treatments and planning  No endoscopic testing at this time    GI team will follow

## 2021-09-16 NOTE — PROGRESS NOTE ADULT - PROBLEM SELECTOR PLAN 3
- more likely related to acute on chronic diarrhea more likely due to c.diff, IBS also possible in setting of psychiatric history, or IBD  - check lipase to r/o pancreatitis as patient has hx of recurrent pancreatitis  - PRN tylenol for pain  - patient takes alosetron 1mg BID at home, hold for now, not on hospital formulary

## 2021-09-16 NOTE — PROGRESS NOTE ADULT - PROBLEM SELECTOR PLAN 5
- patient takes bisoprolol 10mg BID at home  - called pharmacy to continue home meds, hold if HR <60 or SBP <100

## 2021-09-16 NOTE — PROGRESS NOTE ADULT - PROBLEM SELECTOR PLAN 10
- dvt ppx: home xarelto  - dispo: admit to medicine for diarrhea/rectal bleed work-up  - carb consistent diet with Ensures  - PT consult ordered
- dvt ppx: home xarelto  - dispo: admit to medicine for diarrhea/rectal bleed work-up  - carb consistent diet with Ensures  - PT consult ordered

## 2021-09-16 NOTE — PROGRESS NOTE ADULT - PROBLEM SELECTOR PLAN 4
- A1C: 6.1%  - c/w SSI   - patient's leg pain likely related to diabetic neuropathy  - hold patient's home miglitol

## 2021-09-16 NOTE — CONSULT NOTE ADULT - ASSESSMENT
61F PMHx PE on Xarelto, T2DM, HTN, COPD, schizoaffective disorder, bipolar 1 disorder, PTSD, recurrent C. difficile, recurrent pancreatitis, questionable history of IBS and ulcerative colitis presents for complaints of ABD pain, diarrhea, and rectal bleeding, possibly due to multiple etiologies. GI consulted for rectal bleeding and possible fecal transplantation.    #Rectal bleeding - etiologies include mucosal irritation and tearing from ?active C. diff vs flare of IBD. Possible hemorrhoids as well. Not anemic currently and no noted bowel movements since admission  #Diarrhea - recurrent C. diff vs IBD flare vs IBS-D vs pancreatic insufficiency vs microscopic colitis. C. diff pending because previous samples not appropriate. GI PCR negative, stool culture pending.  #PE - currently on Xarelto  #Psychiatric comorbidities     This patient has a complex history which obscures the etiology of her diarrhea. She is noted to have had recurrent episodes of diarrhea related to C. diff, for which she was supposed to receive a fecal transplant on 9/9, but was postponed due to insurance issues. On this admission, she was noted to have loose stool but it was not consistent with the typical stool seen with C. diff infection. Furthermore, patient has not had a visualized bowel movement since admission. Thus, it is unclear if the patient has C. diff at this moment.     Patient also notes that she was diagnosed with ulcerative colitis in [place]; however, there is no colonoscopic or biopsy evidence substantiating the diagnosis. Imaging performed on admission demonstrates no active bowel pathology other than diverticulosis (without diverticulitis).     She also states a history of chronic pancreatitis, which can cause pancreatic insufficiency and lead to diarrhea. She has endorsed chronic severe abdominal pain, but is not the characteristic pain of pancreatitis (radiating to the back). Additionally, imaging is negative for any acute findings within the pancreas. Finally, lipase on admission was WNL.    Microscopic colitis is certainly a possibility as the patient is within the age range. However, she describes her stools as alternating between watery and mushy (never solid) and dark, which is not typical of diarrhea seen in microscopic colitis.     Lastly, patient may also be afflicted with diarrheal variant of IBS.     #Recommendations  - No fecal transplantation inpatient. Patient states she has an appointment for fecal transplantation in [place] or [place] in October.  - Contact outpatient GI doctor to inquire about IBD history, specifically about diagnosis modality (colonoscopy and histological/specimen results)  - Obtain stool sample to assess for presence of C. diff.  - Monitor stools closely for evidence of bleeding as well as consistency and frequency  - Monitor blood counts in setting of complaint of rectal bleeding  - Will continue to monitor closely. Further intervention pending visualization of stools, evaluation of presence of C. diff, and persistence of symptoms with clear objective clinical evidence.    If you have any questions, please contact myself or the [facility] [place] on the General GI service via [employer] Teams. Thank you.    Fahad Muniz II, PGY-3 61F PMHx PE on Xarelto, T2DM, HTN, COPD, schizoaffective disorder, bipolar 1 disorder, PTSD, recurrent C. difficile, recurrent pancreatitis, questionable history of IBS and ulcerative colitis presents for complaints of ABD pain, diarrhea, and rectal bleeding, possibly due to multiple etiologies. GI consulted for rectal bleeding and possible fecal transplantation.    #Rectal bleeding - etiologies include mucosal irritation and tearing from ?active C. diff vs flare of IBD. Possible hemorrhoids as well. Not anemic currently and no noted bowel movements since admission  #Diarrhea - recurrent C. diff vs IBD flare vs IBS-D vs pancreatic insufficiency vs microscopic colitis. C. diff pending because previous samples not appropriate. GI PCR negative, stool culture pending.  #PE - currently on Xarelto  #Psychiatric comorbidities     This patient has a complex history which obscures the etiology of her diarrhea. She is noted to have had recurrent episodes of diarrhea related to C. diff, for which she was supposed to receive a fecal transplant on 9/9, but was postponed due to insurance issues. On this admission, she was noted to have loose stool but it was not consistent with the typical stool seen with C. diff infection. Furthermore, patient has not had a visualized bowel movement since admission. Thus, it is unclear if the patient has C. diff at this moment.     Patient also notes that she was diagnosed with ulcerative colitis in [place]; however, there is no colonoscopic or biopsy evidence substantiating the diagnosis. Imaging performed on admission demonstrates no active bowel pathology other than diverticulosis (without diverticulitis).     She also states a history of chronic pancreatitis, which can cause pancreatic insufficiency and lead to diarrhea. She has endorsed chronic severe abdominal pain, but is not the characteristic pain of pancreatitis (radiating to the back). Additionally, imaging is negative for any acute findings within the pancreas. Finally, lipase on admission was WNL.    Microscopic colitis is certainly a possibility as the patient is within the age range. However, she describes her stools as alternating between watery and mushy (never solid) and dark, which is not typical of diarrhea seen in microscopic colitis.     Lastly, patient may also be afflicted with diarrheal variant of IBS. Or this could be a manifestation of patient's psychiatric comorbidities.    #Recommendations  - No fecal transplantation inpatient. Patient states she has an appointment for fecal transplantation in [place] or [place] in October.  - Contact outpatient GI doctor to inquire about IBD history, specifically about diagnosis modality (colonoscopy and histological/specimen results)  - Obtain stool sample to assess for presence of C. diff.  - Monitor stools closely for evidence of bleeding as well as consistency and frequency  - Monitor blood counts in setting of complaint of rectal bleeding  - Further intervention pending visualization of stools, evaluation for presence of C. diff, and persistence of symptoms with clear objective clinical evidence.  - Would overall monitor patient very closely. She perseverates about the fecal transplant which is not unreasonable if she has persistent C. diff; however, on this hospitalization, she appears highly motivated to receive this transplant. The inconsistencies between her history and objective labs/findings, her allergy to vancomycin, the absence of diarrhea, and her psychiatric history/overall demeanor hitchcock a concern that she may create circumstances that she believes will expedite that process.    If you have any questions, please contact myself or the [facility] [place] on the General GI service via [employer] Teams. Thank you.    Fahad Muniz II, PGY-3 61F PMHx PE on Xarelto, T2DM, HTN, COPD, schizoaffective disorder, bipolar 1 disorder, PTSD, recurrent C. difficile, recurrent pancreatitis, questionable history of IBS and ulcerative colitis presents for complaints of ABD pain, diarrhea, and rectal bleeding, possibly due to multiple etiologies. GI consulted for rectal bleeding and possible fecal transplantation.    #Rectal bleeding - etiologies include mucosal irritation and tearing from ?active C. diff vs flare of IBD. Possible hemorrhoids as well. Not anemic currently and no noted bowel movements since admission  #Diarrhea - recurrent C. diff vs IBD flare vs IBS-D vs pancreatic insufficiency vs microscopic colitis. C. diff pending because previous samples not appropriate. GI PCR negative, stool culture pending.  #PE - currently on Xarelto  #Psychiatric comorbidities     This patient has a complex history which obscures the etiology of her diarrhea. She is noted to have had recurrent episodes of diarrhea related to C. diff, for which she was supposed to receive a fecal transplant on 9/9, but was postponed due to insurance issues. On this admission, she was noted to have loose stool but it was not consistent with the typical stool seen with C. diff infection. Furthermore, patient has not had a visualized bowel movement since admission. Thus, it is unclear if the patient has C. diff at this moment.     Patient also notes that she was diagnosed with ulcerative colitis in [place]; however, there is no colonoscopic or biopsy evidence substantiating the diagnosis. Imaging performed on admission demonstrates no active bowel pathology other than diverticulosis (without diverticulitis).     She also states a history of chronic pancreatitis, which can cause pancreatic insufficiency and lead to diarrhea. She has endorsed chronic severe abdominal pain, but is not the characteristic pain of pancreatitis (radiating to the back). Additionally, imaging is negative for any acute findings within the pancreas. Finally, lipase on admission was WNL.    Microscopic colitis is certainly a possibility as the patient is within the age range. However, she describes her stools as alternating between watery and mushy (never solid) and dark, which is not typical of diarrhea seen in microscopic colitis.     Lastly, patient may also be afflicted with diarrheal variant of IBS. Or this could be a manifestation of patient's psychiatric comorbidities.    #Recommendations  - No fecal transplantation inpatient. Patient states she has an appointment for fecal transplantation in [place] or [place] in October.  - No acute indication for scope as there is no objective evidence that patient is bleeding and that she is hemodynamically stable with stable blood counts not requiring transfusion  - Contact outpatient GI doctor to inquire about IBD history, specifically about diagnosis modality (colonoscopy and histological/specimen results)  - Obtain stool sample and send (if appropriate sample for testing) to assess for presence of C. diff.  - Monitor stools closely for evidence of bleeding as well as consistency and frequency  - Monitor blood counts in setting of complaint of rectal bleeding  - Further intervention pending visualization of stools, evaluation for presence of C. diff, and persistence of symptoms with clear objective clinical evidence.  - Would overall monitor patient very closely. She perseverates about the fecal transplant which is not unreasonable if she has persistent C. diff; however, on this hospitalization, she appears highly motivated to receive this transplant. The inconsistencies between her history and objective labs/findings, her allergy to vancomycin, the absence of diarrhea, and her psychiatric history/overall demeanor hitchcock a concern that she may create circumstances that she believes will expedite that process.    If you have any questions, please contact myself or the [facility] [place] on the General GI service via [employer] Teams. Thank you.    Fahad Muniz II, PGY-3

## 2021-09-16 NOTE — PROGRESS NOTE ADULT - ASSESSMENT
Patient is a 62yo F with PMH of PE on xarelto, DM, HTN, COPD, schizoaffective disorder, Bipolar 1, PTSD, recurrent C diff infections, recurrent pancreatitis, ?IBS, ?ulcerative colitis presenting with abdominal pain, diarrhea, and rectal bleeding. Pending C.diff studies. Pending ID evaluation.

## 2021-09-16 NOTE — PROGRESS NOTE ADULT - SUBJECTIVE AND OBJECTIVE BOX
Follow Up: Diarrhea     Interval History/ROS: No bowel movement today. She's "backed up". This has never happened to her before. She also has a painful rash in her gluteal cleft which she says is brand new since coming to the ED. No fevers.     Allergies  acyclovir (Swelling; Pruritus)  azithromycin (Rash)  Bupap (Swelling; Rash)  clindamycin (Rash)  erythromycin (Anaphylaxis; Rash; Swelling; Pruritus)  fentanyl (Hives)  Flagyl (Rash; Swelling; Pruritus; Hives)  penicillin (Pruritus; Rash)  pioglitazone (Vomiting; Rash; Nausea)  vancomycin (Swelling; Pruritus)        ANTIMICROBIALS:      OTHER MEDS:  acetaminophen   Tablet .. 650 milliGRAM(s) Oral every 6 hours PRN  bisoprolol   Tablet 10 milliGRAM(s) Oral <User Schedule>  brexpiprazole 1 milliGRAM(s) Oral daily  budesonide 160 MICROgram(s)/formoterol 4.5 MICROgram(s) Inhaler 2 Puff(s) Inhalation two times a day  dexAMETHasone     Tablet 1 milliGRAM(s) Oral daily  dextrose 40% Gel 15 Gram(s) Oral once  dextrose 5%. 1000 milliLiter(s) IV Continuous <Continuous>  dextrose 5%. 1000 milliLiter(s) IV Continuous <Continuous>  dextrose 50% Injectable 25 Gram(s) IV Push once  dextrose 50% Injectable 12.5 Gram(s) IV Push once  dextrose 50% Injectable 25 Gram(s) IV Push once  doxazosin 2 milliGRAM(s) Oral at bedtime  glucagon  Injectable 1 milliGRAM(s) IntraMuscular once  influenza   Vaccine 0.5 milliLiter(s) IntraMuscular once  insulin lispro (ADMELOG) corrective regimen sliding scale   SubCutaneous three times a day before meals  insulin lispro (ADMELOG) corrective regimen sliding scale   SubCutaneous at bedtime  morphine  - Injectable 1 milliGRAM(s) IV Push every 6 hours PRN  ondansetron Injectable 4 milliGRAM(s) IV Push once PRN  oxybutynin 10 milliGRAM(s) Oral every 12 hours  rivaroxaban 20 milliGRAM(s) Oral <User Schedule>  zinc oxide 40% Ointment 1 Application(s) Topical daily      Vital Signs Last 24 Hrs  T(C): 36.8 (16 Sep 2021 16:36), Max: 36.8 (16 Sep 2021 16:36)  T(F): 98.3 (16 Sep 2021 16:36), Max: 98.3 (16 Sep 2021 16:36)  HR: 65 (16 Sep 2021 16:36) (62 - 70)  BP: 106/69 (16 Sep 2021 16:36) (101/67 - 124/73)  BP(mean): --  RR: 18 (16 Sep 2021 16:36) (18 - 18)  SpO2: 96% (16 Sep 2021 16:36) (95% - 100%)    Physical Exam:  General: awake, alert, non toxic  Head: atraumatic, normocephalic  Eye: normal sclera and conjunctiva  Cardio: regular rate   Respiratory: nonlabored on room air  abd: soft, no tenderness, nondistended   Skin: no rash  Neurologic: no focal deficit  psych: normal affect                          11.7   10.77 )-----------( 246      ( 15 Sep 2021 09:06 )             37.1       09-15    140  |  109<H>  |  9   ----------------------------<  121<H>  3.9   |  19<L>  |  0.66    Ca    9.1      15 Sep 2021 05:49  Phos  4.2     09-15  Mg     1.9     09-15    TPro  6.5  /  Alb  3.5  /  TBili  0.1<L>  /  DBili  x   /  AST  8<L>  /  ALT  10  /  AlkPhos  62  09-15          MICROBIOLOGY:  Culture - Stool (collected 09-15-21 @ 08:39)  Source: .Stool Feces  Preliminary Report (09-16-21 @ 12:21):    No enteric pathogens to date: Final culture pending    GI PCR Panel, Stool (collected 09-15-21 @ 08:39)  Source: .Stool Feces  Final Report (09-15-21 @ 13:03):    GI PCR Results: NOT detected    *******Please Note:*******    GI panel PCR evaluates for:    Campylobacter, Plesiomonas shigelloides, Salmonella,    Vibrio, Yersinia enterocolitica, Enteroaggregative    Escherichia coli (EAEC), Enteropathogenic E.coli (EPEC),    Enterotoxigenic E. coli (ETEC) lt/st, Shiga-like    toxin-producing E. coli (STEC) stx1/stx2,    Shigella/ Enteroinvasive E. coli (EIEC), Cryptosporidium,    Cyclospora cayetanensis, Entamoeba histolytica,    Giardia lamblia, Adenovirus F 40/41, Astrovirus,    Norovirus GI/GII, Rotavirus A, Sapovirus    Culture - Blood (collected 09-14-21 @ 20:54)  Source: .Blood Blood-Peripheral  Preliminary Report (09-15-21 @ 21:02):    No growth to date.    Culture - Blood (collected 09-14-21 @ 20:54)  Source: .Blood Blood-Peripheral  Preliminary Report (09-15-21 @ 21:02):    No growth to date.    RADIOLOGY:  Images below reviewed personally  CT Abdomen and Pelvis w/ IV Cont (09.14.21 @ 20:25)   No acute intra-abdominal pathology.

## 2021-09-17 ENCOUNTER — TRANSCRIPTION ENCOUNTER (OUTPATIENT)
Age: 61
End: 2021-09-17

## 2021-09-17 VITALS
OXYGEN SATURATION: 98 % | TEMPERATURE: 98 F | DIASTOLIC BLOOD PRESSURE: 70 MMHG | HEART RATE: 70 BPM | RESPIRATION RATE: 18 BRPM | SYSTOLIC BLOOD PRESSURE: 110 MMHG

## 2021-09-17 LAB
A1C WITH ESTIMATED AVERAGE GLUCOSE RESULT: 6.1 % — HIGH (ref 4–5.6)
ALBUMIN SERPL ELPH-MCNC: 3.9 G/DL — SIGNIFICANT CHANGE UP (ref 3.3–5)
ALP SERPL-CCNC: 57 U/L — SIGNIFICANT CHANGE UP (ref 40–120)
ALT FLD-CCNC: 11 U/L — SIGNIFICANT CHANGE UP (ref 10–45)
ANION GAP SERPL CALC-SCNC: 16 MMOL/L — SIGNIFICANT CHANGE UP (ref 5–17)
APPEARANCE UR: ABNORMAL
AST SERPL-CCNC: 10 U/L — SIGNIFICANT CHANGE UP (ref 10–40)
BACTERIA # UR AUTO: ABNORMAL
BILIRUB SERPL-MCNC: 0.1 MG/DL — LOW (ref 0.2–1.2)
BILIRUB UR-MCNC: NEGATIVE — SIGNIFICANT CHANGE UP
BUN SERPL-MCNC: 15 MG/DL — SIGNIFICANT CHANGE UP (ref 7–23)
CALCIUM SERPL-MCNC: 9.3 MG/DL — SIGNIFICANT CHANGE UP (ref 8.4–10.5)
CHLORIDE SERPL-SCNC: 104 MMOL/L — SIGNIFICANT CHANGE UP (ref 96–108)
CO2 SERPL-SCNC: 19 MMOL/L — LOW (ref 22–31)
COLOR SPEC: ABNORMAL
COVID-19 SPIKE DOMAIN AB INTERP: POSITIVE
COVID-19 SPIKE DOMAIN ANTIBODY RESULT: >250 U/ML — HIGH
CREAT SERPL-MCNC: 0.63 MG/DL — SIGNIFICANT CHANGE UP (ref 0.5–1.3)
CULTURE RESULTS: SIGNIFICANT CHANGE UP
DIFF PNL FLD: ABNORMAL
EPI CELLS # UR: 3 /HPF — SIGNIFICANT CHANGE UP
ESTIMATED AVERAGE GLUCOSE: 128 MG/DL — HIGH (ref 68–114)
GLUCOSE SERPL-MCNC: 125 MG/DL — HIGH (ref 70–99)
GLUCOSE UR QL: NEGATIVE — SIGNIFICANT CHANGE UP
HCT VFR BLD CALC: 35.6 % — SIGNIFICANT CHANGE UP (ref 34.5–45)
HGB BLD-MCNC: 11.2 G/DL — LOW (ref 11.5–15.5)
HYALINE CASTS # UR AUTO: 0 /LPF — SIGNIFICANT CHANGE UP (ref 0–2)
KETONES UR-MCNC: NEGATIVE — SIGNIFICANT CHANGE UP
LEUKOCYTE ESTERASE UR-ACNC: ABNORMAL
MCHC RBC-ENTMCNC: 30.4 PG — SIGNIFICANT CHANGE UP (ref 27–34)
MCHC RBC-ENTMCNC: 31.5 GM/DL — LOW (ref 32–36)
MCV RBC AUTO: 96.5 FL — SIGNIFICANT CHANGE UP (ref 80–100)
NITRITE UR-MCNC: NEGATIVE — SIGNIFICANT CHANGE UP
NRBC # BLD: 0 /100 WBCS — SIGNIFICANT CHANGE UP (ref 0–0)
PH UR: 6.5 — SIGNIFICANT CHANGE UP (ref 5–8)
PLATELET # BLD AUTO: 243 K/UL — SIGNIFICANT CHANGE UP (ref 150–400)
POTASSIUM SERPL-MCNC: 4 MMOL/L — SIGNIFICANT CHANGE UP (ref 3.5–5.3)
POTASSIUM SERPL-SCNC: 4 MMOL/L — SIGNIFICANT CHANGE UP (ref 3.5–5.3)
PROT SERPL-MCNC: 6.8 G/DL — SIGNIFICANT CHANGE UP (ref 6–8.3)
PROT UR-MCNC: NEGATIVE — SIGNIFICANT CHANGE UP
RBC # BLD: 3.69 M/UL — LOW (ref 3.8–5.2)
RBC # FLD: 15.2 % — HIGH (ref 10.3–14.5)
RBC CASTS # UR COMP ASSIST: 49 /HPF — HIGH (ref 0–4)
SARS-COV-2 IGG+IGM SERPL QL IA: >250 U/ML — HIGH
SARS-COV-2 IGG+IGM SERPL QL IA: POSITIVE
SODIUM SERPL-SCNC: 139 MMOL/L — SIGNIFICANT CHANGE UP (ref 135–145)
SP GR SPEC: 1.01 — SIGNIFICANT CHANGE UP (ref 1.01–1.02)
SPECIMEN SOURCE: SIGNIFICANT CHANGE UP
UROBILINOGEN FLD QL: NEGATIVE — SIGNIFICANT CHANGE UP
WBC # BLD: 10.11 K/UL — SIGNIFICANT CHANGE UP (ref 3.8–10.5)
WBC # FLD AUTO: 10.11 K/UL — SIGNIFICANT CHANGE UP (ref 3.8–10.5)
WBC UR QL: 6 /HPF — HIGH (ref 0–5)

## 2021-09-17 PROCEDURE — 86850 RBC ANTIBODY SCREEN: CPT

## 2021-09-17 PROCEDURE — 87045 FECES CULTURE AEROBIC BACT: CPT

## 2021-09-17 PROCEDURE — 86900 BLOOD TYPING SEROLOGIC ABO: CPT

## 2021-09-17 PROCEDURE — 86769 SARS-COV-2 COVID-19 ANTIBODY: CPT

## 2021-09-17 PROCEDURE — 83690 ASSAY OF LIPASE: CPT

## 2021-09-17 PROCEDURE — 96374 THER/PROPH/DIAG INJ IV PUSH: CPT | Mod: XU

## 2021-09-17 PROCEDURE — 84295 ASSAY OF SERUM SODIUM: CPT

## 2021-09-17 PROCEDURE — 96375 TX/PRO/DX INJ NEW DRUG ADDON: CPT

## 2021-09-17 PROCEDURE — U0003: CPT

## 2021-09-17 PROCEDURE — 94640 AIRWAY INHALATION TREATMENT: CPT

## 2021-09-17 PROCEDURE — 85610 PROTHROMBIN TIME: CPT

## 2021-09-17 PROCEDURE — 87077 CULTURE AEROBIC IDENTIFY: CPT

## 2021-09-17 PROCEDURE — 87507 IADNA-DNA/RNA PROBE TQ 12-25: CPT

## 2021-09-17 PROCEDURE — 82435 ASSAY OF BLOOD CHLORIDE: CPT

## 2021-09-17 PROCEDURE — 86803 HEPATITIS C AB TEST: CPT

## 2021-09-17 PROCEDURE — 82947 ASSAY GLUCOSE BLOOD QUANT: CPT

## 2021-09-17 PROCEDURE — 84132 ASSAY OF SERUM POTASSIUM: CPT

## 2021-09-17 PROCEDURE — 82962 GLUCOSE BLOOD TEST: CPT

## 2021-09-17 PROCEDURE — 99233 SBSQ HOSP IP/OBS HIGH 50: CPT | Mod: GC

## 2021-09-17 PROCEDURE — 80053 COMPREHEN METABOLIC PANEL: CPT

## 2021-09-17 PROCEDURE — 85025 COMPLETE CBC W/AUTO DIFF WBC: CPT

## 2021-09-17 PROCEDURE — 83605 ASSAY OF LACTIC ACID: CPT

## 2021-09-17 PROCEDURE — 87046 STOOL CULTR AEROBIC BACT EA: CPT

## 2021-09-17 PROCEDURE — 74177 CT ABD & PELVIS W/CONTRAST: CPT | Mod: MA

## 2021-09-17 PROCEDURE — 99285 EMERGENCY DEPT VISIT HI MDM: CPT | Mod: 25

## 2021-09-17 PROCEDURE — 87040 BLOOD CULTURE FOR BACTERIA: CPT

## 2021-09-17 PROCEDURE — 85027 COMPLETE CBC AUTOMATED: CPT

## 2021-09-17 PROCEDURE — 85018 HEMOGLOBIN: CPT

## 2021-09-17 PROCEDURE — 86901 BLOOD TYPING SEROLOGIC RH(D): CPT

## 2021-09-17 PROCEDURE — 99232 SBSQ HOSP IP/OBS MODERATE 35: CPT

## 2021-09-17 PROCEDURE — 87521 HEPATITIS C PROBE&RVRS TRNSC: CPT

## 2021-09-17 PROCEDURE — 97161 PT EVAL LOW COMPLEX 20 MIN: CPT

## 2021-09-17 PROCEDURE — 83735 ASSAY OF MAGNESIUM: CPT

## 2021-09-17 PROCEDURE — 85730 THROMBOPLASTIN TIME PARTIAL: CPT

## 2021-09-17 PROCEDURE — 84100 ASSAY OF PHOSPHORUS: CPT

## 2021-09-17 PROCEDURE — 81001 URINALYSIS AUTO W/SCOPE: CPT

## 2021-09-17 PROCEDURE — 97116 GAIT TRAINING THERAPY: CPT

## 2021-09-17 PROCEDURE — 82803 BLOOD GASES ANY COMBINATION: CPT

## 2021-09-17 PROCEDURE — 82330 ASSAY OF CALCIUM: CPT

## 2021-09-17 PROCEDURE — U0005: CPT

## 2021-09-17 PROCEDURE — 83036 HEMOGLOBIN GLYCOSYLATED A1C: CPT

## 2021-09-17 PROCEDURE — 85014 HEMATOCRIT: CPT

## 2021-09-17 RX ORDER — ZINC OXIDE 200 MG/G
1 OINTMENT TOPICAL
Qty: 0 | Refills: 0 | DISCHARGE
Start: 2021-09-17

## 2021-09-17 RX ADMIN — MORPHINE SULFATE 1 MILLIGRAM(S): 50 CAPSULE, EXTENDED RELEASE ORAL at 03:30

## 2021-09-17 RX ADMIN — Medication 650 MILLIGRAM(S): at 16:01

## 2021-09-17 RX ADMIN — BISOPROLOL FUMARATE 10 MILLIGRAM(S): 10 TABLET, FILM COATED ORAL at 06:42

## 2021-09-17 RX ADMIN — BREXPIPRAZOLE 1 MILLIGRAM(S): 0.25 TABLET ORAL at 13:31

## 2021-09-17 RX ADMIN — RIVAROXABAN 20 MILLIGRAM(S): KIT at 21:17

## 2021-09-17 RX ADMIN — Medication 10 MILLIGRAM(S): at 06:42

## 2021-09-17 RX ADMIN — MORPHINE SULFATE 1 MILLIGRAM(S): 50 CAPSULE, EXTENDED RELEASE ORAL at 13:01

## 2021-09-17 RX ADMIN — Medication 650 MILLIGRAM(S): at 22:42

## 2021-09-17 RX ADMIN — Medication 1 MILLIGRAM(S): at 06:42

## 2021-09-17 RX ADMIN — Medication 0: at 08:11

## 2021-09-17 RX ADMIN — Medication 10 MILLIGRAM(S): at 17:18

## 2021-09-17 RX ADMIN — Medication 0: at 12:04

## 2021-09-17 RX ADMIN — BUDESONIDE AND FORMOTEROL FUMARATE DIHYDRATE 2 PUFF(S): 160; 4.5 AEROSOL RESPIRATORY (INHALATION) at 17:25

## 2021-09-17 RX ADMIN — MORPHINE SULFATE 1 MILLIGRAM(S): 50 CAPSULE, EXTENDED RELEASE ORAL at 18:35

## 2021-09-17 RX ADMIN — BUDESONIDE AND FORMOTEROL FUMARATE DIHYDRATE 2 PUFF(S): 160; 4.5 AEROSOL RESPIRATORY (INHALATION) at 06:43

## 2021-09-17 RX ADMIN — Medication 650 MILLIGRAM(S): at 21:24

## 2021-09-17 RX ADMIN — MORPHINE SULFATE 1 MILLIGRAM(S): 50 CAPSULE, EXTENDED RELEASE ORAL at 19:05

## 2021-09-17 RX ADMIN — ZINC OXIDE 1 APPLICATION(S): 200 OINTMENT TOPICAL at 13:30

## 2021-09-17 RX ADMIN — Medication 2 MILLIGRAM(S): at 21:17

## 2021-09-17 RX ADMIN — Medication 650 MILLIGRAM(S): at 16:31

## 2021-09-17 RX ADMIN — MORPHINE SULFATE 1 MILLIGRAM(S): 50 CAPSULE, EXTENDED RELEASE ORAL at 12:31

## 2021-09-17 RX ADMIN — BISOPROLOL FUMARATE 10 MILLIGRAM(S): 10 TABLET, FILM COATED ORAL at 17:19

## 2021-09-17 NOTE — DISCHARGE NOTE PROVIDER - NSDCCPTREATMENT_GEN_ALL_CORE_FT
PRINCIPAL PROCEDURE  Procedure: Abdomen CT  Findings and Treatment: 9/14/2021:   FINDINGS:  LOWER CHEST: Within normal limits.  LIVER: Small hypodensity in the right hepatic lobe, too small to characterize.  BILE DUCTS: Normal caliber.  GALLBLADDER: Contracted  SPLEEN: Within normal limits.  PANCREAS: Within normal limits.  ADRENALS: Within normal limits.  KIDNEYS/URETERS: Bilateral renal hypodensities, some which represent simple cysts and others too small to characterize. No hydronephrosis.  BLADDER: Within normal limits.  REPRODUCTIVE ORGANS: Hysterectomy. No adnexal masses.  BOWEL: Diverticulosis without evidence of diverticulitis. Nobowel obstruction. Appendix is normal.  PERITONEUM: No ascites.  VESSELS: Atherosclerotic changes.  RETROPERITONEUM/LYMPH NODES: No lymphadenopathy.  ABDOMINAL WALL: Within normal limits.  BONES: Within normal limits.  IMPRESSION:  No acute intra-abdominal pathology.

## 2021-09-17 NOTE — PROGRESS NOTE ADULT - ATTENDING COMMENTS
above plans discussed with Dr. Mojica    # recurrent/refractory c.diff colitis?  # allergy to vancomycin, flagyl  # bloody diarrhea, abdominal pain  # bipolar disorder  # PE on xarelto    - few episodes of BM overnight but formed, no diarrhea; not suitable for C.diff testing  - on admission, pt does NOT meet sepsis criteria; CTAP without signs of colitis  - pt reports that she cannot tolerate either vanco or flagyl, along with allergies to multiple abx and other meds  - ID consulted for possible fecal transplant as inpatient: care discussed with Dr. Ness; resume Fidaxomicin; not a candidate for FMT  - appreciate GI recs: no indication for scope at this time  - fu c.diff toxin/pcr, GI PCR, stool culture  - H/H stable, no signs of bleeding other than pt reporting bloody diarrhea; continue Xarelto  - pain control  - PT consult  - nutrition consult  - pt already has appt in [place] for fecal transplant procedure on Oct 13th    Radha Galvez MD  Division of Hospital Medicine  Cell: [phone]  Office: [phone] above plans discussed with Dr. Mojica    # recurrent/refractory c.diff colitis?  # allergy to vancomycin, flagyl  # bloody diarrhea, abdominal pain  # bipolar disorder  # PE on xarelto    - few episodes of BM overnight but formed, no diarrhea; not suitable for C.diff testing  - on admission, pt does NOT meet sepsis criteria; CTAP without signs of colitis  - pt reports that she cannot tolerate either vanco or flagyl, along with allergies to multiple abx and other meds  - ID consulted for possible fecal transplant as inpatient: care discussed with Dr. Ness; resume Fidaxomicin; not a candidate for FMT  - appreciate GI recs: no indication for scope at this time  - fu c.diff toxin/pcr, GI PCR, stool culture  - H/H stable, no signs of bleeding other than pt reporting bloody diarrhea; continue Xarelto  - pain control  - PT recs home PT  - pt already has appt in [place] for fecal transplant procedure on Oct 13th    Radha Galvez MD  Division of Hospital Medicine  Cell: [phone]  Office: [phone]

## 2021-09-17 NOTE — DISCHARGE NOTE PROVIDER - CARE PROVIDER_API CALL
Vargas Koch)  Gastroenterology; Internal Medicine  [address] [place] [address]  Phone: [phone]  Fax: [phone]  Follow Up Time:

## 2021-09-17 NOTE — DISCHARGE NOTE NURSING/CASE MANAGEMENT/SOCIAL WORK - PATIENT PORTAL LINK FT
You can access the FollowMyHealth Patient Portal offered by [facility] by registering at the following website: [email or web address]. By joining [facility]’s FollowMyHealth portal, you will also be able to view your health information using other applications (apps) compatible with our system.

## 2021-09-17 NOTE — DISCHARGE NOTE PROVIDER - NSFOLLOWUPCLINICS_GEN_ALL_ED_FT
Gastroenterology at [facility]  Gastroenterology  [address] [place] [id number]  Phone: [phone]  Fax:

## 2021-09-17 NOTE — DISCHARGE NOTE PROVIDER - CARE PROVIDERS DIRECT ADDRESSES
[email or web address]

## 2021-09-17 NOTE — DISCHARGE NOTE PROVIDER - NSDCFUADDAPPT_GEN_ALL_CORE_FT
1. Please follow-up with outpatient GI clinic to establish care with a GI physician at [facility]

## 2021-09-17 NOTE — PROGRESS NOTE ADULT - ATTENDING COMMENTS
Patient reports having 2 BM overnight  Not witnessed by overnight team, nursing, or collected    No GI intervention at this time including endoscopy or inpatient FMT    If patient has witnessed loose stool, would send for CDiff  Otherwise would defer rest of GI workup to outpatient setting with the physician she follows with

## 2021-09-17 NOTE — PROGRESS NOTE ADULT - SUBJECTIVE AND OBJECTIVE BOX
Interval Events:   - Inconsistent history regarding BMs - reports 2 "hard" BMs overnight. No BM per team.  - Requests access to her medications from home    Allergies:  acyclovir (Swelling; Pruritus)  azithromycin (Rash)  Bupap (Swelling; Rash)  clindamycin (Rash)  erythromycin (Anaphylaxis; Rash; Swelling; Pruritus)  fentanyl (Hives)  Flagyl (Rash; Swelling; Pruritus; Hives)  penicillin (Pruritus; Rash)  pioglitazone (Vomiting; Rash; Nausea)  vancomycin (Swelling; Pruritus)      Hospital Medications:  acetaminophen   Tablet .. 650 milliGRAM(s) Oral every 6 hours PRN  bisoprolol   Tablet 10 milliGRAM(s) Oral <User Schedule>  brexpiprazole 1 milliGRAM(s) Oral daily  budesonide 160 MICROgram(s)/formoterol 4.5 MICROgram(s) Inhaler 2 Puff(s) Inhalation two times a day  dexAMETHasone     Tablet 1 milliGRAM(s) Oral daily  dextrose 40% Gel 15 Gram(s) Oral once  dextrose 5%. 1000 milliLiter(s) IV Continuous <Continuous>  dextrose 5%. 1000 milliLiter(s) IV Continuous <Continuous>  dextrose 50% Injectable 25 Gram(s) IV Push once  dextrose 50% Injectable 12.5 Gram(s) IV Push once  dextrose 50% Injectable 25 Gram(s) IV Push once  doxazosin 2 milliGRAM(s) Oral at bedtime  glucagon  Injectable 1 milliGRAM(s) IntraMuscular once  influenza   Vaccine 0.5 milliLiter(s) IntraMuscular once  insulin lispro (ADMELOG) corrective regimen sliding scale   SubCutaneous three times a day before meals  insulin lispro (ADMELOG) corrective regimen sliding scale   SubCutaneous at bedtime  morphine  - Injectable 1 milliGRAM(s) IV Push every 6 hours PRN  oxybutynin 10 milliGRAM(s) Oral every 12 hours  rivaroxaban 20 milliGRAM(s) Oral <User Schedule>  zinc oxide 40% Ointment 1 Application(s) Topical daily      PMHX/PSHX:  AF (atrial fibrillation)    Pulmonary embolism    Recurrent pancreatitis    Recurrent Clostridioides difficile infection    H/O: HTN (hypertension)    Bipolar 1 disorder    Schizoaffective disorder    PTSD (post-traumatic stress disorder)    S/P hysterectomy        Family history:  FH: GI cancer (Grandparent, Aunt)        ROS:   General:  No wt loss, fevers, chills, night sweats, fatigue,   Eyes:  Good vision, no reported pain  ENT:  No sore throat, pain, runny nose, dysphagia  CV:  No pain, palpitations, hypo/hypertension  Pulm:  No dyspnea, cough, tachypnea, wheezing  GI:  As per HPI  :  No pain, bleeding, incontinence, nocturia  Muscle:  No pain, weakness  Neuro:  No weakness, tingling, memory problems  Psych:  No fatigue, insomnia, mood problems, depression  Endocrine:  No polyuria, polydipsia, cold/heat intolerance  Heme:  No petechiae, ecchymosis, easy bruisability  Skin:  No rash, tattoos, scars, edema      PHYSICAL EXAM:   Vital Signs:  Vital Signs Last 24 Hrs  T(C): 36.8 (17 Sep 2021 07:58), Max: 36.9 (16 Sep 2021 22:00)  T(F): 98.2 (17 Sep 2021 07:58), Max: 98.5 (16 Sep 2021 22:00)  HR: 67 (17 Sep 2021 07:58) (56 - 78)  BP: 115/80 (17 Sep 2021 07:58) (106/69 - 130/78)  BP(mean): --  RR: 18 (17 Sep 2021 07:58) (16 - 18)  SpO2: 100% (17 Sep 2021 07:58) (96% - 100%)  Daily     Daily       21 @ 07:01  -  21 @ 07:00  --------------------------------------------------------  IN: 1000 mL / OUT: 0 mL / NET: 1000 mL      GENERAL:  No acute distress  HEENT:  Normocephalic/atraumtic,  no scleral icterus  CHEST:  No accessory muscle use  HEART:  Regular rate and rhythm  ABDOMEN:  Soft, non-tender, non-distended  EXTREMITIES:  No cyanosis, clubbing, or edema  SKIN:  No rash  NEURO:  Alert and oriented x 3, no asterixis, no tremor    LABS:                        11.2   10.11 )-----------( 243      ( 17 Sep 2021 09:32 )             35.6     Mean Cell Volume: 96.5 fl (09-17-21 @ 09:32)        139  |  104  |  15  ----------------------------<  125<H>  4.0   |  19<L>  |  0.63    Ca    9.3      17 Sep 2021 09:33  Phos  4.3       Mg     1.8     -    TPro  6.8  /  Alb  3.9  /  TBili  0.1<L>  /  DBili  x   /  AST  10  /  ALT  11  /  AlkPhos  57  17    LIVER FUNCTIONS - ( 17 Sep 2021 09:33 )  Alb: 3.9 g/dL / Pro: 6.8 g/dL / ALK PHOS: 57 U/L / ALT: 11 U/L / AST: 10 U/L / GGT: x             Urinalysis Basic - ( 17 Sep 2021 09:47 )    Color: Light Orange / Appearance: Slightly Turbid / S.012 / pH: x  Gluc: x / Ketone: Negative  / Bili: Negative / Urobili: Negative   Blood: x / Protein: Negative / Nitrite: Negative   Leuk Esterase: Large / RBC: 49 /hpf / WBC 6 /HPF   Sq Epi: x / Non Sq Epi: 3 /hpf / Bacteria: Moderate                              11.2   10.11 )-----------( 243      ( 17 Sep 2021 09:32 )             35.6                         11.2   11.29 )-----------( 263      ( 16 Sep 2021 17:56 )             35.1                         11.7   10.77 )-----------( 246      ( 15 Sep 2021 09:06 )             37.1                         11.7   10.78 )-----------( 293      ( 14 Sep 2021 17:29 )             37.7       Imaging:

## 2021-09-17 NOTE — PROGRESS NOTE ADULT - SUBJECTIVE AND OBJECTIVE BOX
PROGRESS NOTE:   Authored by Dr. Josette Mojica MD (PGY-1). Pager [facility] [phone] / [facility]     Patient is a 61y old  Female who presents with a chief complaint of Rectal bleeding (16 Sep 2021 17:31)      SUBJECTIVE / OVERNIGHT EVENTS:  No acute events overnight.     ADDITIONAL REVIEW OF SYSTEMS:  Patient denies fevers, chills, chest pain, shortness of breath, nausea, abdominal pain, diarrhea, constipation, dysuria, leg swelling, headache, light headedness.    MEDICATIONS  (STANDING):  bisoprolol   Tablet 10 milliGRAM(s) Oral <User Schedule>  brexpiprazole 1 milliGRAM(s) Oral daily  budesonide 160 MICROgram(s)/formoterol 4.5 MICROgram(s) Inhaler 2 Puff(s) Inhalation two times a day  dexAMETHasone     Tablet 1 milliGRAM(s) Oral daily  dextrose 40% Gel 15 Gram(s) Oral once  dextrose 5%. 1000 milliLiter(s) (50 mL/Hr) IV Continuous <Continuous>  dextrose 5%. 1000 milliLiter(s) (100 mL/Hr) IV Continuous <Continuous>  dextrose 50% Injectable 25 Gram(s) IV Push once  dextrose 50% Injectable 12.5 Gram(s) IV Push once  dextrose 50% Injectable 25 Gram(s) IV Push once  doxazosin 2 milliGRAM(s) Oral at bedtime  glucagon  Injectable 1 milliGRAM(s) IntraMuscular once  influenza   Vaccine 0.5 milliLiter(s) IntraMuscular once  insulin lispro (ADMELOG) corrective regimen sliding scale   SubCutaneous three times a day before meals  insulin lispro (ADMELOG) corrective regimen sliding scale   SubCutaneous at bedtime  oxybutynin 10 milliGRAM(s) Oral every 12 hours  rivaroxaban 20 milliGRAM(s) Oral <User Schedule>  zinc oxide 40% Ointment 1 Application(s) Topical daily    MEDICATIONS  (PRN):  acetaminophen   Tablet .. 650 milliGRAM(s) Oral every 6 hours PRN Mild Pain (1 - 3), Moderate Pain (4 - 6)  morphine  - Injectable 1 milliGRAM(s) IV Push every 6 hours PRN Severe Pain (7 - 10)      CAPILLARY BLOOD GLUCOSE      POCT Blood Glucose.: 129 mg/dL (16 Sep 2021 21:15)  POCT Blood Glucose.: 122 mg/dL (16 Sep 2021 17:02)  POCT Blood Glucose.: 104 mg/dL (16 Sep 2021 11:40)  POCT Blood Glucose.: 79 mg/dL (16 Sep 2021 07:39)    I&O's Summary    16 Sep 2021 07:01  -  17 Sep 2021 07:00  --------------------------------------------------------  IN: 1000 mL / OUT: 0 mL / NET: 1000 mL        PHYSICAL EXAM:  Vital Signs Last 24 Hrs  T(C): 36.8 (17 Sep 2021 05:52), Max: 36.9 (16 Sep 2021 22:00)  T(F): 98.3 (17 Sep 2021 05:52), Max: 98.5 (16 Sep 2021 22:00)  HR: 56 (17 Sep 2021 05:52) (56 - 78)  BP: 130/57 (17 Sep 2021 05:52) (101/67 - 130/78)  BP(mean): --  RR: 18 (17 Sep 2021 05:52) (16 - 18)  SpO2: 96% (17 Sep 2021 05:52) (95% - 97%)    CONSTITUTIONAL: NAD, well-developed  RESPIRATORY: Normal respiratory effort; lungs are clear to auscultation bilaterally  CARDIOVASCULAR: Regular rate and rhythm, normal S1 and S2, no murmur/rub/gallop; No lower extremity edema; Peripheral pulses are 2+ bilaterally  ABDOMEN: Nontender to palpation, normoactive bowel sounds, no rebound/guarding; No hepatosplenomegaly  MUSCLOSKELETAL: no clubbing or cyanosis of digits; no joint swelling or tenderness to palpation  PSYCH: A+O to person, place, and time; affect appropriate    LABS:                        11.2   11.29 )-----------( 263      ( 16 Sep 2021 17:56 )             35.1     09-16    139  |  107  |  17  ----------------------------<  93  3.9   |  19<L>  |  0.56    Ca    8.8      16 Sep 2021 17:56  Phos  4.3     09-16  Mg     1.8     09-16    TPro  6.9  /  Alb  3.9  /  TBili  <0.1<L>  /  DBili  x   /  AST  11  /  ALT  14  /  AlkPhos  59  09-16              Culture - Stool (collected 15 Sep 2021 08:39)  Source: .Stool Feces  Preliminary Report (16 Sep 2021 12:21):    No enteric pathogens to date: Final culture pending    GI PCR Panel, Stool (collected 15 Sep 2021 08:39)  Source: .Stool Feces  Final Report (15 Sep 2021 13:03):    GI PCR Results: NOT detected    *******Please Note:*******    GI panel PCR evaluates for:    Campylobacter, Plesiomonas shigelloides, Salmonella,    Vibrio, Yersinia enterocolitica, Enteroaggregative    Escherichia coli (EAEC), Enteropathogenic E.coli (EPEC),    Enterotoxigenic E. coli (ETEC) lt/st, Shiga-like    toxin-producing E. coli (STEC) stx1/stx2,    Shigella/ Enteroinvasive E. coli (EIEC), Cryptosporidium,    Cyclospora cayetanensis, Entamoeba histolytica,    Giardia lamblia, Adenovirus F 40/41, Astrovirus,    Norovirus GI/GII, Rotavirus A, Sapovirus    Culture - Blood (collected 14 Sep 2021 20:54)  Source: .Blood Blood-Peripheral  Preliminary Report (15 Sep 2021 21:02):    No growth to date.    Culture - Blood (collected 14 Sep 2021 20:54)  Source: .Blood Blood-Peripheral  Preliminary Report (15 Sep 2021 21:02):    No growth to date.        Tele Reviewed:    RADIOLOGY & ADDITIONAL TESTS:  Results Reviewed:   Imaging Personally Reviewed:  Electrocardiogram Personally Reviewed:

## 2021-09-17 NOTE — PROGRESS NOTE ADULT - ASSESSMENT
61F PMHx PE on Xarelto, T2DM, HTN, COPD, schizoaffective disorder, bipolar 1 disorder, PTSD, recurrent C. difficile, recurrent pancreatitis, questionable history of IBS and ulcerative colitis presents for complaints of ABD pain, diarrhea, and rectal bleeding, possibly due to multiple etiologies. GI consulted for rectal bleeding and possible fecal transplantation.    #Rectal bleeding - etiologies include mucosal irritation and tearing from ?active C. diff vs flare of IBD. Possible hemorrhoids as well. Not anemic currently and no noted bowel movements since admission  #Diarrhea - Patient with no documented bowel movements since admission. Patient now reports having "hard" bowel movements.   #PE - currently on Xarelto  #Psychiatric comorbidities     This patient has a complex history which obscures the etiology of her diarrhea. She is noted to have had recurrent episodes of diarrhea related to C. diff, for which she was supposed to receive a fecal transplant on 9/9, but was postponed due to insurance issues. On this admission, she was noted to have loose stool but it was not consistent with the typical stool seen with C. diff infection. Furthermore, patient has not had a visualized bowel movement since admission. Thus, it is unclear if the patient has C. diff at this moment.     Patient also notes that she was diagnosed with ulcerative colitis in [place]; however, there is no colonoscopic or biopsy evidence substantiating the diagnosis. Imaging performed on admission demonstrates no active bowel pathology other than diverticulosis (without diverticulitis).     She also states a history of chronic pancreatitis, which can cause pancreatic insufficiency and lead to diarrhea. She has endorsed chronic severe abdominal pain, but is not the characteristic pain of pancreatitis (radiating to the back). Additionally, imaging is negative for any acute findings within the pancreas. Finally, lipase on admission was WNL.    Microscopic colitis is certainly a possibility as the patient is within the age range. However, she describes her stools as alternating between watery and mushy (never solid) and dark, which is not typical of diarrhea seen in microscopic colitis.     Lastly, patient may also be afflicted with diarrheal variant of IBS. Or this could be a manifestation of patient's psychiatric comorbidities.    #Recommendations  - No fecal transplantation inpatient. Patient states she has an appointment for fecal transplantation in [place] or [place] in October.  - No acute indication for scope as there is no objective evidence that patient is bleeding and that she is hemodynamically stable with stable blood counts not requiring transfusion  - Contact outpatient GI doctor to inquire about IBD history, specifically about diagnosis modality (colonoscopy and histological/specimen results)  - Obtain stool sample and send (if appropriate sample for testing) to assess for presence of C. diff.  - Monitor stools closely for evidence of bleeding as well as consistency and frequency  - Monitor blood counts in setting of complaint of rectal bleeding  - Further intervention pending visualization of stools, evaluation for presence of C. diff, and persistence of symptoms with clear objective clinical evidence.  - Would overall monitor patient very closely. She perseverates about the fecal transplant which is not unreasonable if she has persistent C. diff; however, on this hospitalization, she appears highly motivated to receive this transplant. The inconsistencies between her history and objective labs/findings, her allergy to vancomycin, the absence of diarrhea, and her psychiatric history/overall demeanor hitchcock a concern that she may create circumstances that she believes will expedite that process.  - At this point in time, GI will sign off. If has documented diarrhea please let GI know.    Thank you for involving us in the care of this patient. Please reach out if any further questions.    Morales Campuzano, PGY-5  GI Fellow    Available on [employer] Teams  Pager [phone] ([facility]) or [phone] ([facility])  After 5PM/Weekends, please contact the on-call GI fellow: [phone]  Available through [employer] Teams

## 2021-09-17 NOTE — DISCHARGE NOTE PROVIDER - NSDCMRMEDTOKEN_GEN_ALL_CORE_FT
alosetron 1 mg oral tablet: 1 tab(s) orally 2 times a day  bisoprolol 10 mg oral tablet: orally 2 times a day  Breo Ellipta 200 mcg-25 mcg/inh inhalation powder: 1 puff(s) inhaled once a day  dexamethasone 1 mg oral tablet: orally once a day  Dificid 200 mg oral tablet: orally 3 times a day  estradiol 1 mg oral tablet: 1 tab(s) orally once a day  mesalamine 1.2 g oral delayed release tablet: orally 2 times a day  miglitol 25 mg oral tablet: orally 2 times a day  omeprazole 40 mg oral delayed release capsule: 1 cap(s) orally once a day  ondansetron 4 mg oral tablet: orally 2 times a day, As Needed  oxybutynin extended release: 10 milligram(s) orally 2 times a day  prazosin 2 mg oral capsule: orally once a day (at bedtime)  Rexulti 1 mg oral tablet: 1 tab(s) orally once a day  Xarelto 20 mg oral tablet: 1 tab(s) orally once a day (in the evening)   alosetron 1 mg oral tablet: 1 tab(s) orally 2 times a day  bisoprolol 10 mg oral tablet: orally 2 times a day  Breo Ellipta 200 mcg-25 mcg/inh inhalation powder: 1 puff(s) inhaled once a day  dexamethasone 1 mg oral tablet: orally once a day  Dificid 200 mg oral tablet: orally 3 times a day  estradiol 1 mg oral tablet: 1 tab(s) orally once a day  mesalamine 1.2 g oral delayed release tablet: orally 2 times a day  miglitol 25 mg oral tablet: orally 2 times a day  omeprazole 40 mg oral delayed release capsule: 1 cap(s) orally once a day  ondansetron 4 mg oral tablet: orally 2 times a day, As Needed  oxybutynin extended release: 10 milligram(s) orally 2 times a day  prazosin 2 mg oral capsule: orally once a day (at bedtime)  Rexulti 1 mg oral tablet: 1 tab(s) orally once a day  Xarelto 20 mg oral tablet: 1 tab(s) orally once a day (in the evening)  zinc oxide 40% topical ointment: 1 application topically once a day

## 2021-09-17 NOTE — DISCHARGE NOTE PROVIDER - NSDCCPCAREPLAN_GEN_ALL_CORE_FT
PRINCIPAL DISCHARGE DIAGNOSIS  Diagnosis: Diarrhea  Assessment and Plan of Treatment: You presented to the hospital with concerns of bloody diarrhea consistent with your history of C.diff. During your evaluation, you vitals signs remained stable, hemoglobin levels were stable, and no episodes of any fever or chills. You had formed stools during your stay, however these stools are not compatible with the c.diff lab testing tubes. We obtained a CT scan of your abdomen, which showed no concerns for colitis, which would be seen in C.diff. GI PCR of your stool found no pathogens. We also requested infectious disease and our gastroenterolgy team to evaluate you during your stay. Unfortunately, your stools were not compatible for C.diff at this time and an emergent scope was not indicated given no sign of active bleeding. We reviewed your medical records and are aware that you have a scheduled appointment for fecal transplant in October 13-14th of this year, 2021. You are currently taking a medication for your history of c.diff called, Fidaxamicin and hope you continue to take this medication. Since you were not diagnosed with C.diff during the hospital stay, you will not need isolation or contact precuations at this time. Please continue your medications and have a safe recovery with your appointment in [place]. We hope this procedure helps your gut heal. Please follow-up with the outpatient GI clinic to establish care and schedule routine colonoscopy moving forward. If you have bloody diarrhea, fever, chills, nausea, abdominal pain, chest pain or shortness of breath, please contact your doctor immediately.         SECONDARY DISCHARGE DIAGNOSES  Diagnosis: Musculoskeletal back pain  Assessment and Plan of Treatment: You have a history of lumbar arthritic pain requiring assistance and pain controll. We evaluated you during your admission and controlled your pain. We requested rehab as a consideration and fortunately are able to place you at [facility]. We hope you recover at this rehab. Please keep a record of your pain and continue exercises to improve circulation through your extremities. Please follow-up with your PCP to discuss management of your pain moving forward.

## 2021-09-17 NOTE — DISCHARGE NOTE PROVIDER - HOSPITAL COURSE
Patient is a 60yo F with PMH of PE on xarelto, DM, HTN, COPD, schizoaffective disorder, Bipolar 1, PTSD, recurrent C diff infections, recurrent pancreatitis, ?IBS, ?ulcerative colitis presenting with abdominal pain, diarrhea, and rectal bleeding. Patient notes that she has had diarrhea over the past week with worsening over the past 3-4 days. She also endorses incontinence due to the diarrhea. She states the rectal bleeding started 2 days ago. She notes bright red blood when she uses toilet paper. She has also been having abdominal pain and diffuse tenderness. She also associates these symptoms with decreased PO intake and nausea. Patient notes that she is also having rectal pain due to the frequency of diarrhea. Pain is severe enough where she cannot even walk at times. Patient has been taking fidaxomicin 200mg TID for C.diff as she is allergic to vancomycin and metronidazole. She was recently planned for fecal transplant on 9/9 but it got cancelled due to insurance issues.     Patient has been seen by several physicians ([facility], [place], [place], [facility], [facility], etc) and hospitalized for C.diff since 2016. She has also been previously hospitalized for recurrent pancreatitis. She states that she has IBS and ulcerative colitis. She states that once previously she was treated with Stelera that improved her symptoms but she did not receive medication again.     his patient has a complex history which obscures the etiology of her diarrhea. She is noted to have had recurrent episodes of diarrhea related to C. diff, for which she was supposed to receive a fecal transplant on 9/9, but was postponed due to insurance issues. On this admission, she was noted to have loose stool but it was not consistent with the typical stool seen with C. diff infection. Furthermore, patient has not had a visualized bowel movement since admission. Thus, it is unclear if the patient has C. diff at this moment.     Patient also notes that she was diagnosed with ulcerative colitis in [place]; however, there is no colonoscopic or biopsy evidence substantiating the diagnosis. Imaging performed on admission demonstrates no active bowel pathology other than diverticulosis (without diverticulitis).     She also states a history of chronic pancreatitis, which can cause pancreatic insufficiency and lead to diarrhea. She has endorsed chronic severe abdominal pain, but is not the characteristic pain of pancreatitis (radiating to the back). Additionally, imaging is negative for any acute findings within the pancreas. Finally, lipase on admission was WNL.    Microscopic colitis is certainly a possibility as the patient is within the age range. However, she describes her stools as alternating between watery and mushy (never solid) and dark, which is not typical of diarrhea seen in microscopic colitis.     Lastly, patient may also be afflicted with diarrheal variant of IBS. Or this could be a manifestation of patient's psychiatric comorbidities.     Patient is a 62yo F with PMH of PE on xarelto, DM, HTN, COPD, schizoaffective disorder, Bipolar 1, PTSD, recurrent C diff infections, recurrent pancreatitis, ?IBS, ?ulcerative colitis presenting with abdominal pain, diarrhea, and rectal bleeding. Patient notes that she has had diarrhea over the past week with worsening over the past 3-4 days. She also endorses incontinence due to the diarrhea. She states the rectal bleeding started 2 days ago. She notes bright red blood when she uses toilet paper. She has also been having abdominal pain and diffuse tenderness. She also associates these symptoms with decreased PO intake and nausea. Patient notes that she is also having rectal pain due to the frequency of diarrhea. Pain is severe enough where she cannot even walk at times. Patient has been taking fidaxomicin 200mg TID for C.diff as she is allergic to vancomycin and metronidazole. She was recently planned for fecal transplant on 9/9 but it got cancelled due to insurance issues.     Patient has been seen by several physicians ([facility], [place], [facility], [facility], [facility], etc) and hospitalized for C.diff since 2016. She has also been previously hospitalized for recurrent pancreatitis. She states that she has IBS and ulcerative colitis. She states that once previously she was treated with Stelera that improved her symptoms but she did not receive medication again.     his patient has a complex history which obscures the etiology of her diarrhea. She is noted to have had recurrent episodes of diarrhea related to C. diff, for which she was supposed to receive a fecal transplant on 9/9, but was postponed due to insurance issues. On this admission, she was noted to have loose stool but it was not consistent with the typical stool seen with C. diff infection. Furthermore, patient has not had a visualized bowel movement since admission. Thus, it is unclear if the patient has C. diff at this moment.     Patient also notes that she was diagnosed with ulcerative colitis in [place]; however, there is no colonoscopic or biopsy evidence substantiating the diagnosis. Imaging performed on admission demonstrates no active bowel pathology other than diverticulosis (without diverticulitis).     She also states a history of chronic pancreatitis, which can cause pancreatic insufficiency and lead to diarrhea. She has endorsed chronic severe abdominal pain, but is not the characteristic pain of pancreatitis (radiating to the back). Additionally, imaging is negative for any acute findings within the pancreas. Finally, lipase on admission was WNL.    Microscopic colitis is certainly a possibility as the patient is within the age range. However, she describes her stools as alternating between watery and mushy (never solid) and dark, which is not typical of diarrhea seen in microscopic colitis.     Lastly, patient may also be afflicted with diarrheal variant of IBS. Or this could be a manifestation of patient's psychiatric comorbidities.    During admission, the patient had one bowel movement at the ED, however, the stools were "too formed" to send for C.diff. Since then, there have been no witnessed bowel movement. She has been on Fidaxomicin since July and reports its not working, which is unusual. She is not septic appearing, VSS, GI PCR negative, abdomen soft, CT unremarkable. Last known C.diff per review of records in 2019. Pt has a scheduled appointment in [place] for a fecal transplant on 10/13- 10/14. Presentation not consistent with C.diff. GI consulted, no hx of colonoscopy from review of records. No indication for scope, no evidence of bleeding, hemodynamically stable with stable blood counts not requiring transfusion. Will follow-up with outpatient GI.            Patient is a 60yo F with PMH of PE on xarelto, DM, HTN, COPD, schizoaffective disorder, Bipolar 1, PTSD, recurrent C diff infections, recurrent pancreatitis, ?IBS, ?ulcerative colitis presenting with abdominal pain, diarrhea, and rectal bleeding. Patient notes that she has had diarrhea over the past week with worsening over the past 3-4 days. She also endorses incontinence due to the diarrhea. She states the rectal bleeding started 2 days ago. She notes bright red blood when she uses toilet paper. She has also been having abdominal pain and diffuse tenderness. She also associates these symptoms with decreased PO intake and nausea. Patient notes that she is also having rectal pain due to the frequency of diarrhea. Pain is severe enough where she cannot even walk at times. Patient has been taking fidaxomicin 200mg TID for C.diff as she is allergic to vancomycin and metronidazole. She was recently planned for fecal transplant on 9/9 but it got cancelled due to insurance issues.     Patient has been seen by several physicians ([facility], [place], [place], [facility], [facility], etc) and hospitalized for C.diff since 2016. She has also been previously hospitalized for recurrent pancreatitis. She states that she has IBS and ulcerative colitis. She states that once previously she was treated with Stelera that improved her symptoms but she did not receive medication again.     This patient has a complex history which obscures the etiology of her diarrhea. She is noted to have had recurrent episodes of diarrhea related to C. diff, for which she was supposed to receive a fecal transplant on 9/9, but was postponed due to insurance issues. On this admission, she was noted to have loose stool but it was not consistent with the typical stool seen with C. diff infection. Furthermore, patient has not had a visualized bowel movement since admission. Thus, it is unclear if the patient has C. diff at this moment.     Patient also notes that she was diagnosed with ulcerative colitis in [place]; however, there is no colonoscopic or biopsy evidence substantiating the diagnosis. Imaging performed on admission demonstrates no active bowel pathology other than diverticulosis (without diverticulitis).     She also states a history of chronic pancreatitis, which can cause pancreatic insufficiency and lead to diarrhea. She has endorsed chronic severe abdominal pain, but is not the characteristic pain of pancreatitis (radiating to the back). Additionally, imaging is negative for any acute findings within the pancreas. Finally, lipase on admission was WNL.    Microscopic colitis is certainly a possibility as the patient is within the age range. However, she describes her stools as alternating between watery and mushy (never solid) and dark, which is not typical of diarrhea seen in microscopic colitis.     Lastly, patient may also be afflicted with diarrheal variant of IBS. Or this could be a manifestation of patient's psychiatric comorbidities.    During admission, the patient had one bowel movement at the ED, however, the stools were "too formed" to send for C.diff. Since then, there have been no witnessed bowel movement. She has been on Fidaxomicin since July and reports its not working, which is unusual. She is not septic appearing, VSS, GI PCR negative, abdomen soft, CT unremarkable. Last known C.diff per review of records in 2019. Pt has a scheduled appointment in [place] for a fecal transplant on 10/13- 10/14. Presentation not consistent with C.diff. GI consulted, no hx of colonoscopy from review of records. No indication for scope, no evidence of bleeding, hemodynamically stable with stable blood counts not requiring transfusion. Will follow-up with outpatient GI. Pt hemodynamically stable. DC to rehab [facility]

## 2021-09-18 LAB — HCV RNA FLD QL NAA+PROBE: SIGNIFICANT CHANGE UP

## 2021-09-19 LAB
CULTURE RESULTS: SIGNIFICANT CHANGE UP
CULTURE RESULTS: SIGNIFICANT CHANGE UP
SPECIMEN SOURCE: SIGNIFICANT CHANGE UP
SPECIMEN SOURCE: SIGNIFICANT CHANGE UP

## 2021-09-27 ENCOUNTER — EMERGENCY (EMERGENCY)
Facility: HOSPITAL | Age: 61
LOS: 1 days | Discharge: HOME | End: 2021-09-27
Attending: PERSONAL EMERGENCY RESPONSE ATTENDANT | Admitting: EMERGENCY MEDICINE
Payer: MEDICARE

## 2021-09-27 VITALS
TEMPERATURE: 98 F | RESPIRATION RATE: 18 BRPM | SYSTOLIC BLOOD PRESSURE: 115 MMHG | DIASTOLIC BLOOD PRESSURE: 73 MMHG | HEART RATE: 67 BPM | OXYGEN SATURATION: 100 % | HEIGHT: 66 IN

## 2021-09-27 DIAGNOSIS — Z90.710 ACQUIRED ABSENCE OF BOTH CERVIX AND UTERUS: Chronic | ICD-10-CM

## 2021-09-27 LAB
ALBUMIN SERPL ELPH-MCNC: 4.5 G/DL — SIGNIFICANT CHANGE UP (ref 3.3–5)
ALP SERPL-CCNC: 59 U/L — SIGNIFICANT CHANGE UP (ref 40–120)
ALT FLD-CCNC: 17 U/L — SIGNIFICANT CHANGE UP (ref 4–33)
ANION GAP SERPL CALC-SCNC: 15 MMOL/L — HIGH (ref 7–14)
AST SERPL-CCNC: 15 U/L — SIGNIFICANT CHANGE UP (ref 4–32)
BASOPHILS # BLD AUTO: 0.03 K/UL — SIGNIFICANT CHANGE UP (ref 0–0.2)
BASOPHILS NFR BLD AUTO: 0.2 % — SIGNIFICANT CHANGE UP (ref 0–2)
BILIRUB SERPL-MCNC: 0.2 MG/DL — SIGNIFICANT CHANGE UP (ref 0.2–1.2)
BLOOD GAS VENOUS COMPREHENSIVE RESULT: SIGNIFICANT CHANGE UP
BUN SERPL-MCNC: 33 MG/DL — HIGH (ref 7–23)
CALCIUM SERPL-MCNC: 9.5 MG/DL — SIGNIFICANT CHANGE UP (ref 8.4–10.5)
CHLORIDE SERPL-SCNC: 105 MMOL/L — SIGNIFICANT CHANGE UP (ref 98–107)
CO2 SERPL-SCNC: 15 MMOL/L — LOW (ref 22–31)
CREAT SERPL-MCNC: 1.04 MG/DL — SIGNIFICANT CHANGE UP (ref 0.5–1.3)
EOSINOPHIL # BLD AUTO: 0.01 K/UL — SIGNIFICANT CHANGE UP (ref 0–0.5)
EOSINOPHIL NFR BLD AUTO: 0.1 % — SIGNIFICANT CHANGE UP (ref 0–6)
GLUCOSE SERPL-MCNC: 102 MG/DL — HIGH (ref 70–99)
HCT VFR BLD CALC: 35.1 % — SIGNIFICANT CHANGE UP (ref 34.5–45)
HGB BLD-MCNC: 12.2 G/DL — SIGNIFICANT CHANGE UP (ref 11.5–15.5)
IANC: 8.95 K/UL — HIGH (ref 1.5–8.5)
IMM GRANULOCYTES NFR BLD AUTO: 1.1 % — SIGNIFICANT CHANGE UP (ref 0–1.5)
LIDOCAIN IGE QN: 98 U/L — HIGH (ref 7–60)
LYMPHOCYTES # BLD AUTO: 19.2 % — SIGNIFICANT CHANGE UP (ref 13–44)
LYMPHOCYTES # BLD AUTO: 2.38 K/UL — SIGNIFICANT CHANGE UP (ref 1–3.3)
MCHC RBC-ENTMCNC: 32.1 PG — SIGNIFICANT CHANGE UP (ref 27–34)
MCHC RBC-ENTMCNC: 34.8 GM/DL — SIGNIFICANT CHANGE UP (ref 32–36)
MCV RBC AUTO: 92.4 FL — SIGNIFICANT CHANGE UP (ref 80–100)
MONOCYTES # BLD AUTO: 0.88 K/UL — SIGNIFICANT CHANGE UP (ref 0–0.9)
MONOCYTES NFR BLD AUTO: 7.1 % — SIGNIFICANT CHANGE UP (ref 2–14)
NEUTROPHILS # BLD AUTO: 8.95 K/UL — HIGH (ref 1.8–7.4)
NEUTROPHILS NFR BLD AUTO: 72.3 % — SIGNIFICANT CHANGE UP (ref 43–77)
NRBC # BLD: 0 /100 WBCS — SIGNIFICANT CHANGE UP
NRBC # FLD: 0 K/UL — SIGNIFICANT CHANGE UP
PLATELET # BLD AUTO: 282 K/UL — SIGNIFICANT CHANGE UP (ref 150–400)
POTASSIUM SERPL-MCNC: 3.9 MMOL/L — SIGNIFICANT CHANGE UP (ref 3.5–5.3)
POTASSIUM SERPL-SCNC: 3.9 MMOL/L — SIGNIFICANT CHANGE UP (ref 3.5–5.3)
PROT SERPL-MCNC: 7.9 G/DL — SIGNIFICANT CHANGE UP (ref 6–8.3)
RBC # BLD: 3.8 M/UL — SIGNIFICANT CHANGE UP (ref 3.8–5.2)
RBC # FLD: 14.4 % — SIGNIFICANT CHANGE UP (ref 10.3–14.5)
SODIUM SERPL-SCNC: 135 MMOL/L — SIGNIFICANT CHANGE UP (ref 135–145)
WBC # BLD: 12.38 K/UL — HIGH (ref 3.8–10.5)
WBC # FLD AUTO: 12.38 K/UL — HIGH (ref 3.8–10.5)

## 2021-09-27 PROCEDURE — 99284 EMERGENCY DEPT VISIT MOD MDM: CPT

## 2021-09-27 RX ORDER — ACETAMINOPHEN 500 MG
1000 TABLET ORAL ONCE
Refills: 0 | Status: COMPLETED | OUTPATIENT
Start: 2021-09-27 | End: 2021-09-27

## 2021-09-27 RX ORDER — ONDANSETRON 8 MG/1
4 TABLET, FILM COATED ORAL ONCE
Refills: 0 | Status: COMPLETED | OUTPATIENT
Start: 2021-09-27 | End: 2021-09-27

## 2021-09-27 RX ORDER — SODIUM CHLORIDE 9 MG/ML
1000 INJECTION INTRAMUSCULAR; INTRAVENOUS; SUBCUTANEOUS ONCE
Refills: 0 | Status: COMPLETED | OUTPATIENT
Start: 2021-09-27 | End: 2021-09-27

## 2021-09-27 RX ADMIN — ONDANSETRON 4 MILLIGRAM(S): 8 TABLET, FILM COATED ORAL at 22:15

## 2021-09-27 RX ADMIN — Medication 400 MILLIGRAM(S): at 22:15

## 2021-09-27 RX ADMIN — SODIUM CHLORIDE 1000 MILLILITER(S): 9 INJECTION INTRAMUSCULAR; INTRAVENOUS; SUBCUTANEOUS at 22:14

## 2021-09-27 NOTE — ED PROVIDER NOTE - PATIENT PORTAL LINK FT
You can access the FollowMyHealth Patient Portal offered by [facility] by registering at the following website: [email or web address]. By joining [employer]’s FollowMyHealth portal, you will also be able to view your health information using other applications (apps) compatible with our system.

## 2021-09-27 NOTE — ED PROVIDER NOTE - PHYSICAL EXAMINATION
Gen: non toxic appearing, NAD   Head: NC/NT  Eyes:   anicteric  ENT: airway patent, mmm   CV: RRR, +S1/S2  Resp: CTAB, symmetric breath sounds, no W/R/R  GI:  abdomen soft non-distended, +mild diffuse TTP   Back: no CVA tenderness  Extremities -   no edema  Neuro: A&Ox4

## 2021-09-27 NOTE — ED PROVIDER NOTE - PROGRESS NOTE DETAILS
Dr. Kelby Loya DO (ED ATTENDING):  All test results have been reviewed and noted. both serum chemistry and VBG showing metabolic acidosis with borderline normal-elevated anion gap. Lactate normal, not in DKA. Most likely from diarrhea with HC03 low from GI losses/dehydration. Dr. Kelby Loya DO (ED ATTENDING):  All test results have been reviewed and noted. both serum chemistry and VBG showing metabolic acidosis with borderline normal-elevated anion gap. Lactate normal, not in DKA. Most likely from diarrhea with HC03 low from GI losses/dehydration. Feeling better and tolerating PO at this time. Will dc home with outpt f/u

## 2021-09-27 NOTE — ED ADULT NURSE NOTE - OBJECTIVE STATEMENT
Miriam RN-presents to ED for lower abdominal pain x2 days. with nausea vomiting and diarrhea. ABD is soft, non tender, non distended with normal active bowel sounds. Respirations even and unlabored. Lung sounds clear with equal chest rise bilaterally. No complaints of chest pain, headache, nausea, dizziness, vomiting  SOB, fever, chills verbalized, dysuria hematuria. Pmh of HTN, DM, HLD Bipolar DO. 20 G IV placed in RAC labs and covid sent. patient endorsed to primary RN.

## 2021-09-27 NOTE — ED ADULT TRIAGE NOTE - CHIEF COMPLAINT QUOTE
sent in from [facility] for multiple episodes of diarrhea and progressive weakness for e past 3 days, pt with Hx of DM, Bi-polar, schizophrenia denies SI, HI ro AVH, reports COVID vaccinations are up to date.

## 2021-09-27 NOTE — ED PROVIDER NOTE - SHIFT CHANGE DETAILS
Signout received from Dr. Romero.  61F p/w n/v/d, pending CT to r/o SBO, reassess, dispo; ct, labs urine.

## 2021-09-27 NOTE — ED PROVIDER NOTE - NSFOLLOWUPINSTRUCTIONS_ED_ALL_ED_FT
You were seen in the Emergency Room for diarrhea and abdominal pain and evaluated for any life-threatening conditions.     After our evaluation including lab work and a CT scan of the abdomen, we have determined you can be discharged to go home.    Please stay hydrated at home and take electrolyte solutions, like juice, gatorade, or broths.  Please return to the Emergency Room if your symptoms change or worsen, such as worsening abdominal pain, fevers, severe weakness, fainting episodes, or inability to take foods or liquids by mouth.    Please follow up with a general medicine doctor (PMD) within 3-5 days.     If you need help making an appointment with a doctor or do not have your own PMD, you can call our referral line at [phone] to make an appointment with a general medicine doctor (PMD).

## 2021-09-27 NOTE — ED ADULT NURSE NOTE - NSIMPLEMENTINTERV_GEN_ALL_ED
Implemented All Universal Safety Interventions:  [place] to call system. Call bell, personal items and telephone within reach. Instruct patient to call for assistance. Room bathroom lighting operational. Non-slip footwear when patient is off stretcher. Physically safe environment: no spills, clutter or unnecessary equipment. Stretcher in lowest position, wheels locked, appropriate side rails in place.

## 2021-09-27 NOTE — ED PROVIDER NOTE - OBJECTIVE STATEMENT
62 yo F with pmhx of HTN, DM, HLD, on xarelto for ?PE presents with few days of dairrhea, n/v, unable to tolerate PO. Had c-sections in past. No chest pain, shortness of breath, f/c. Also complains of dysuria.

## 2021-09-27 NOTE — ED PROVIDER NOTE - CLINICAL SUMMARY MEDICAL DECISION MAKING FREE TEXT BOX
Concern for SBO, gastroenteritis, colitis, diverticulitis. Labs, imaging, pain meds. Will reassess and dispo accordingly.

## 2021-09-27 NOTE — ED PROVIDER NOTE - ATTENDING CONTRIBUTION TO CARE
Attending MD Woodward.  Agree with HPI by resident. Abdomen soft but distended.  Pt has hx of C-sections.  No focal area of TTP but pt endorses diffuse abdominal pain.  Pt otherwise well appearing.  VS's non-actionable.  Pt endorses non-bloody emesis and blood-streaked diarrhea.  Differential includes gastroenteritis, partial bowel obstruction. Planned labs, CTAP. Attending MD Woodward.  Agree with HPI by resident. Abdomen soft but distended.  Pt has hx of C-sections.  No focal area of TTP but pt endorses diffuse abdominal pain.  Pt otherwise well appearing.  VS's non-actionable.  Pt endorses non-bloody emesis and blood-streaked diarrhea.  Differential includes gastroenteritis, partial bowel obstruction. Planned labs, CTAP.  Pt hemodynamically stable at time of signout.

## 2021-09-27 NOTE — ED ADULT NURSE NOTE - CHIEF COMPLAINT QUOTE
sent in from [facility] for multiple episodes of diarrhea and progressive weakness for e past 3 days, pt with Hx of DM, Bi-polar, schizophrenia denies SI, HI ro AVH, reports COVID vaccinations are up to date.

## 2021-09-28 VITALS
HEART RATE: 67 BPM | RESPIRATION RATE: 16 BRPM | SYSTOLIC BLOOD PRESSURE: 130 MMHG | OXYGEN SATURATION: 100 % | TEMPERATURE: 98 F | DIASTOLIC BLOOD PRESSURE: 83 MMHG

## 2021-09-28 LAB
APPEARANCE UR: CLEAR — SIGNIFICANT CHANGE UP
BASE EXCESS BLDV CALC-SCNC: -11 MMOL/L — LOW (ref -2–3)
BILIRUB UR-MCNC: NEGATIVE — SIGNIFICANT CHANGE UP
BLOOD GAS VENOUS COMPREHENSIVE RESULT: SIGNIFICANT CHANGE UP
CHLORIDE BLDV-SCNC: 107 MMOL/L — SIGNIFICANT CHANGE UP (ref 96–108)
CO2 BLDV-SCNC: 17.2 MMOL/L — LOW (ref 22–26)
COLOR SPEC: YELLOW — SIGNIFICANT CHANGE UP
DIFF PNL FLD: NEGATIVE — SIGNIFICANT CHANGE UP
GAS PNL BLDV: 135 MMOL/L — LOW (ref 136–145)
GLUCOSE BLDV-MCNC: 79 MG/DL — SIGNIFICANT CHANGE UP (ref 70–99)
GLUCOSE UR QL: NEGATIVE — SIGNIFICANT CHANGE UP
HCO3 BLDV-SCNC: 16 MMOL/L — LOW (ref 22–29)
HCT VFR BLDA CALC: 35 % — SIGNIFICANT CHANGE UP (ref 34.5–46.5)
HGB BLD CALC-MCNC: 11.6 G/DL — SIGNIFICANT CHANGE UP (ref 11.5–15.5)
KETONES UR-MCNC: NEGATIVE — SIGNIFICANT CHANGE UP
LACTATE BLDV-MCNC: 0.9 MMOL/L — SIGNIFICANT CHANGE UP (ref 0.5–2)
LEUKOCYTE ESTERASE UR-ACNC: NEGATIVE — SIGNIFICANT CHANGE UP
NITRITE UR-MCNC: NEGATIVE — SIGNIFICANT CHANGE UP
PCO2 BLDV: 39 MMHG — SIGNIFICANT CHANGE UP (ref 39–42)
PH BLDV: 7.22 — LOW (ref 7.32–7.43)
PH UR: 6 — SIGNIFICANT CHANGE UP (ref 5–8)
PO2 BLDV: 59 MMHG — SIGNIFICANT CHANGE UP
POTASSIUM BLDV-SCNC: 4.4 MMOL/L — SIGNIFICANT CHANGE UP (ref 3.5–5.1)
PROT UR-MCNC: ABNORMAL
SAO2 % BLDV: 86.8 % — SIGNIFICANT CHANGE UP
SARS-COV-2 RNA SPEC QL NAA+PROBE: SIGNIFICANT CHANGE UP
SP GR SPEC: 1.03 — SIGNIFICANT CHANGE UP (ref 1–1.05)
UROBILINOGEN FLD QL: SIGNIFICANT CHANGE UP

## 2021-09-28 PROCEDURE — 74177 CT ABD & PELVIS W/CONTRAST: CPT | Mod: 26

## 2021-09-29 LAB
CULTURE RESULTS: SIGNIFICANT CHANGE UP
SPECIMEN SOURCE: SIGNIFICANT CHANGE UP

## 2021-11-10 ENCOUNTER — EMERGENCY (EMERGENCY)
Facility: HOSPITAL | Age: 61
LOS: 1 days | Discharge: HOME | End: 2021-11-10
Attending: EMERGENCY MEDICINE | Admitting: EMERGENCY MEDICINE
Payer: MEDICARE

## 2021-11-10 VITALS
DIASTOLIC BLOOD PRESSURE: 72 MMHG | HEART RATE: 56 BPM | SYSTOLIC BLOOD PRESSURE: 110 MMHG | OXYGEN SATURATION: 100 % | TEMPERATURE: 98 F | HEIGHT: 66 IN | RESPIRATION RATE: 18 BRPM

## 2021-11-10 DIAGNOSIS — Z90.710 ACQUIRED ABSENCE OF BOTH CERVIX AND UTERUS: Chronic | ICD-10-CM

## 2021-11-10 PROCEDURE — 99285 EMERGENCY DEPT VISIT HI MDM: CPT

## 2021-11-10 NOTE — ED ADULT TRIAGE NOTE - CHIEF COMPLAINT QUOTE
c/o B/L LE swelling for the past month worse last few days. Hx of bipolar, denies IS, HI or AVH, pt endorses chest pressure and SOB. No swelling noted in lower extremities but tender to touch, no redness.

## 2021-11-11 VITALS
RESPIRATION RATE: 15 BRPM | DIASTOLIC BLOOD PRESSURE: 57 MMHG | SYSTOLIC BLOOD PRESSURE: 116 MMHG | HEART RATE: 70 BPM | TEMPERATURE: 97 F | OXYGEN SATURATION: 99 %

## 2021-11-11 PROBLEM — E11.9 TYPE 2 DIABETES MELLITUS WITHOUT COMPLICATIONS: Chronic | Status: ACTIVE | Noted: 2020-01-13

## 2021-11-11 PROBLEM — I10 ESSENTIAL (PRIMARY) HYPERTENSION: Chronic | Status: ACTIVE | Noted: 2020-01-13

## 2021-11-11 PROBLEM — E78.5 HYPERLIPIDEMIA, UNSPECIFIED: Chronic | Status: ACTIVE | Noted: 2020-01-13

## 2021-11-11 PROBLEM — F43.10 POST-TRAUMATIC STRESS DISORDER, UNSPECIFIED: Chronic | Status: ACTIVE | Noted: 2020-01-13

## 2021-11-11 PROBLEM — F31.9 BIPOLAR DISORDER, UNSPECIFIED: Chronic | Status: ACTIVE | Noted: 2020-01-13

## 2021-11-11 LAB
ALBUMIN SERPL ELPH-MCNC: 4.2 G/DL — SIGNIFICANT CHANGE UP (ref 3.3–5)
ALP SERPL-CCNC: 65 U/L — SIGNIFICANT CHANGE UP (ref 40–120)
ALT FLD-CCNC: 9 U/L — SIGNIFICANT CHANGE UP (ref 4–33)
ANION GAP SERPL CALC-SCNC: 15 MMOL/L — HIGH (ref 7–14)
AST SERPL-CCNC: 23 U/L — SIGNIFICANT CHANGE UP (ref 4–32)
BASOPHILS # BLD AUTO: 0.05 K/UL — SIGNIFICANT CHANGE UP (ref 0–0.2)
BASOPHILS NFR BLD AUTO: 0.5 % — SIGNIFICANT CHANGE UP (ref 0–2)
BILIRUB SERPL-MCNC: 0.3 MG/DL — SIGNIFICANT CHANGE UP (ref 0.2–1.2)
BUN SERPL-MCNC: 18 MG/DL — SIGNIFICANT CHANGE UP (ref 7–23)
CALCIUM SERPL-MCNC: 9.6 MG/DL — SIGNIFICANT CHANGE UP (ref 8.4–10.5)
CHLORIDE SERPL-SCNC: 105 MMOL/L — SIGNIFICANT CHANGE UP (ref 98–107)
CO2 SERPL-SCNC: 18 MMOL/L — LOW (ref 22–31)
CREAT SERPL-MCNC: 0.73 MG/DL — SIGNIFICANT CHANGE UP (ref 0.5–1.3)
EOSINOPHIL # BLD AUTO: 0.07 K/UL — SIGNIFICANT CHANGE UP (ref 0–0.5)
EOSINOPHIL NFR BLD AUTO: 0.7 % — SIGNIFICANT CHANGE UP (ref 0–6)
GLUCOSE SERPL-MCNC: 75 MG/DL — SIGNIFICANT CHANGE UP (ref 70–99)
HCT VFR BLD CALC: 36.7 % — SIGNIFICANT CHANGE UP (ref 34.5–45)
HGB BLD-MCNC: 11.6 G/DL — SIGNIFICANT CHANGE UP (ref 11.5–15.5)
IANC: 5.99 K/UL — SIGNIFICANT CHANGE UP (ref 1.5–8.5)
IMM GRANULOCYTES NFR BLD AUTO: 0.3 % — SIGNIFICANT CHANGE UP (ref 0–1.5)
LYMPHOCYTES # BLD AUTO: 2.96 K/UL — SIGNIFICANT CHANGE UP (ref 1–3.3)
LYMPHOCYTES # BLD AUTO: 29.4 % — SIGNIFICANT CHANGE UP (ref 13–44)
MCHC RBC-ENTMCNC: 30.4 PG — SIGNIFICANT CHANGE UP (ref 27–34)
MCHC RBC-ENTMCNC: 31.6 GM/DL — LOW (ref 32–36)
MCV RBC AUTO: 96.3 FL — SIGNIFICANT CHANGE UP (ref 80–100)
MONOCYTES # BLD AUTO: 0.96 K/UL — HIGH (ref 0–0.9)
MONOCYTES NFR BLD AUTO: 9.5 % — SIGNIFICANT CHANGE UP (ref 2–14)
NEUTROPHILS # BLD AUTO: 5.99 K/UL — SIGNIFICANT CHANGE UP (ref 1.8–7.4)
NEUTROPHILS NFR BLD AUTO: 59.6 % — SIGNIFICANT CHANGE UP (ref 43–77)
NRBC # BLD: 0 /100 WBCS — SIGNIFICANT CHANGE UP
NRBC # FLD: 0 K/UL — SIGNIFICANT CHANGE UP
NT-PROBNP SERPL-SCNC: 94 PG/ML — SIGNIFICANT CHANGE UP
PLATELET # BLD AUTO: 290 K/UL — SIGNIFICANT CHANGE UP (ref 150–400)
POTASSIUM SERPL-MCNC: 4.4 MMOL/L — SIGNIFICANT CHANGE UP (ref 3.5–5.3)
POTASSIUM SERPL-SCNC: 4.4 MMOL/L — SIGNIFICANT CHANGE UP (ref 3.5–5.3)
PROT SERPL-MCNC: 7.9 G/DL — SIGNIFICANT CHANGE UP (ref 6–8.3)
RBC # BLD: 3.81 M/UL — SIGNIFICANT CHANGE UP (ref 3.8–5.2)
RBC # FLD: 14.5 % — SIGNIFICANT CHANGE UP (ref 10.3–14.5)
SODIUM SERPL-SCNC: 138 MMOL/L — SIGNIFICANT CHANGE UP (ref 135–145)
TROPONIN T, HIGH SENSITIVITY RESULT: <6 NG/L — SIGNIFICANT CHANGE UP
WBC # BLD: 10.06 K/UL — SIGNIFICANT CHANGE UP (ref 3.8–10.5)
WBC # FLD AUTO: 10.06 K/UL — SIGNIFICANT CHANGE UP (ref 3.8–10.5)

## 2021-11-11 PROCEDURE — 71046 X-RAY EXAM CHEST 2 VIEWS: CPT | Mod: 26

## 2021-11-11 RX ORDER — ACETAMINOPHEN 500 MG
975 TABLET ORAL ONCE
Refills: 0 | Status: COMPLETED | OUTPATIENT
Start: 2021-11-11 | End: 2021-11-11

## 2021-11-11 RX ADMIN — Medication 975 MILLIGRAM(S): at 01:48

## 2021-11-11 RX ADMIN — Medication 975 MILLIGRAM(S): at 02:48

## 2021-11-11 NOTE — ED PROVIDER NOTE - NSICDXPASTMEDICALHX_GEN_ALL_CORE_FT
PAST MEDICAL HISTORY:  Bipolar disorder     DM (diabetes mellitus)     HLD (hyperlipidemia)     HTN (hypertension)     No pertinent past medical history     PTSD (post-traumatic stress disorder)

## 2021-11-11 NOTE — ED PROVIDER NOTE - PROGRESS NOTE DETAILS
pt sleeping no resp complaints. informed of results. plan dc home. w pmd followup. Damien Reynoso, PGY-2- Patient examined in ED. Ambulatory. Calling the hotel she's staying at to see if they will take her back, but having problem going back because she hasn't paid the bill. Pt will wait for SW in AM to discuss options for shelter pt  states she was "kicked out of hotel" did not pay. will wait for SW>

## 2021-11-11 NOTE — ED PROVIDER NOTE - PHYSICAL EXAMINATION
Dr Pan   nontoxic female sitting up, was asleep.   No resp distress. able to speak in full and clear sentences. no wheeze, rales or stridor. poor inspiratory effort.   soft nontender abdomen. no  rebound. no guarding. no sign of trauma. no CVAT  no pedal edema. no calf tenderness. normal pulses bilateral feet. tender to light touch of bl feet and anterior tib-fib  no rash

## 2021-11-11 NOTE — ED ADULT NURSE REASSESSMENT NOTE - NS ED NURSE REASSESS COMMENT FT1
Pt. received at change of shift in no acute distress. Pt. A&O 4, ambulatory, denies CP, SOB, N/V/D. Vitals stable.  Will continue to monitor. Plan to d/c to shelter

## 2021-11-11 NOTE — ED ADULT NURSE NOTE - OBJECTIVE STATEMENT
Break Coverage RN: Received pt in room 1, pt sleeping, easily arousable to verbal stimuli, A&Ox3, respirations even and unlabored b/l. Pt c/o BLE swelling, pain, itchiness since last Wed. Also c/o slight chest pain and SOB. Sinus rhythm on cardiac monitor. Appears in no apparent distress. Awaiting MD dowd. PMHx bipolar disorder, PTSD, htn, hld. Denies SI/HI at this time. Denies A/V hallucinations. Awaiting orders. Will continue to monitor.

## 2021-11-11 NOTE — ED ADULT NURSE NOTE - NSIMPLEMENTINTERV_GEN_ALL_ED
Implemented All Fall Risk Interventions:  [place] to call system. Call bell, personal items and telephone within reach. Instruct patient to call for assistance. Room bathroom lighting operational. Non-slip footwear when patient is off stretcher. Physically safe environment: no spills, clutter or unnecessary equipment. Stretcher in lowest position, wheels locked, appropriate side rails in place. Provide visual cue, wrist band, yellow gown, etc. Monitor gait and stability. Monitor for mental status changes and reorient to person, place, and time. Review medications for side effects contributing to fall risk. Reinforce activity limits and safety measures with patient and family.

## 2021-11-11 NOTE — ED PROVIDER NOTE - IV ALTEPLASE EXCL ABS HIDDEN
Med rec complete per pt at bedside  Interviewed pt with family at bedside with permission from pt  Allergies reviewed and updated.       show

## 2021-11-11 NOTE — ED PROVIDER NOTE - PATIENT PORTAL LINK FT
You can access the FollowMyHealth Patient Portal offered by [facility] by registering at the following website: [email or web address]. By joining [employer]’s FollowMyHealth portal, you will also be able to view your health information using other applications (apps) compatible with our system.

## 2021-11-11 NOTE — ED PROVIDER NOTE - NSFOLLOWUPINSTRUCTIONS_ED_ALL_ED_FT
Follow up with own doctor in one day  Return to ED for any worsening of symptoms  Followup with cardiology in one to two weeks.

## 2021-11-11 NOTE — PROVIDER CONTACT NOTE (OTHER) - ASSESSMENT
Pt would like to go to [facility] ([address] [place] [address]) but needs transport. SW arranged ambulance via [facility] ([phone]), trip id: [id number], : within the hour. SW informed team. No further SW needs at this time.

## 2021-11-11 NOTE — ED PROVIDER NOTE - OBJECTIVE STATEMENT
Dr Pan  61yoF hx of HTN, HLD, DM, schizophrenia, hx PE on Xarelto pw bl feet/leg pain x one week. Described as "itching, burning pain" start at bl sole of feet "moving up" to the leg. no trauma. Worse at night. Min relieve w tylenol. Also endorse "sometimes chest pain" located mid sternal/left chest, non radiating, non exertional. not pleuritic, no associated HESTER or SOB. no cough. no chest pain. Last episode "I think last night" . no n/v/d no fever or chills. no weakness.

## 2021-11-20 ENCOUNTER — EMERGENCY (EMERGENCY)
Facility: HOSPITAL | Age: 61
LOS: 1 days | Discharge: HOME | End: 2021-11-20
Attending: STUDENT IN AN ORGANIZED HEALTH CARE EDUCATION/TRAINING PROGRAM
Payer: MEDICARE

## 2021-11-20 VITALS
DIASTOLIC BLOOD PRESSURE: 92 MMHG | OXYGEN SATURATION: 98 % | WEIGHT: 169.09 LBS | RESPIRATION RATE: 18 BRPM | TEMPERATURE: 98 F | HEART RATE: 80 BPM | HEIGHT: 66 IN | SYSTOLIC BLOOD PRESSURE: 128 MMHG

## 2021-11-20 VITALS
DIASTOLIC BLOOD PRESSURE: 64 MMHG | OXYGEN SATURATION: 98 % | SYSTOLIC BLOOD PRESSURE: 144 MMHG | TEMPERATURE: 98 F | HEART RATE: 64 BPM | RESPIRATION RATE: 15 BRPM

## 2021-11-20 DIAGNOSIS — Z90.710 ACQUIRED ABSENCE OF BOTH CERVIX AND UTERUS: Chronic | ICD-10-CM

## 2021-11-20 PROCEDURE — 82962 GLUCOSE BLOOD TEST: CPT

## 2021-11-20 PROCEDURE — 99283 EMERGENCY DEPT VISIT LOW MDM: CPT | Mod: GC

## 2021-11-20 PROCEDURE — 99284 EMERGENCY DEPT VISIT MOD MDM: CPT

## 2021-11-20 RX ORDER — IBUPROFEN 200 MG
600 TABLET ORAL ONCE
Refills: 0 | Status: COMPLETED | OUTPATIENT
Start: 2021-11-20 | End: 2021-11-20

## 2021-11-20 RX ADMIN — Medication 600 MILLIGRAM(S): at 04:46

## 2021-11-20 NOTE — ED PROVIDER NOTE - NSFOLLOWUPCLINICS_GEN_ALL_ED_FT
[facility] - Primary Care  Primary Care  [address] [place] [address]  Phone: [phone]  Fax:     [facility] Endocrinology  Endocrinology  [address] [place] [address]  Phone: [phone]  Fax:     [facility] Orthopedic Surgery  Orthopedic Surgery  [address], 3rd & 4th [place] [address]  Phone: [phone]  Fax:

## 2021-11-20 NOTE — ED PROVIDER NOTE - NS ED ROS FT
CONSTITUTIONAL: No fevers, no chills, no lightheadedness, no dizziness  EYES: no visual changes  CV: No chest pain, no palpitations  RESP: No SOB, no cough  GI: No n/v/d, no abd pain  : no dysuria, no hematuria, no flank pain  MSK: no back pain, no extremity pain  SKIN: no rashes  NEURO: no headache, no focal weakness, no decreased sensation/paresthesias, +burning pain to B/L LE   PSYCHIATRIC: no known mental health issues

## 2021-11-20 NOTE — ED PROVIDER NOTE - PHYSICAL EXAMINATION
Physical Exam:  Gen: NAD, AOx3, non-toxic appearing, able to ambulate without assistance  HEENT: normal conjunctiva, tongue midline, oral mucosa moist  Lung: CTAB, no respiratory distress, no wheezes/rhonchi/rales B/L, speaking in full sentences  CV: RRR, no murmurs, rubs or gallops  Abd: soft, NT, ND, no guarding, no rigidity, no rebound tenderness, no CVA tenderness   MSK: no visible deformities, ROM normal in UE/LE, no back pain  Neuro: No focal sensory or motor deficits  Skin: Warm, well perfused, no rash, no leg swelling  Psych: normal affect, calm  Ashley Barajas D.O. -Resident

## 2021-11-20 NOTE — ED PROVIDER NOTE - CLINICAL SUMMARY MEDICAL DECISION MAKING FREE TEXT BOX
61y F w/ PMHx HTN, T2DM, HLD presenting with one week of B/L burning pain to feet. VS WNL, FS 78, no wounds to feet to lower extremity swelling noted. Endorses burning pain, intact sensation. Able to ambulate independently without difficulty or pain. Will treat pain, provide rapid referral for Endocrine, and PMD, ROSALIE lucas.

## 2021-11-20 NOTE — ED ADULT TRIAGE NOTE - NSWEIGHTCALCTOOLDRUG_GEN_A_CORE
2 RN skin assessment complete with GENE Hollingsworth. Skin intact. There are some scabs on L ear, bilat UEs, and L lower leg. Pt's coccyx redness, blanching    used

## 2021-11-20 NOTE — ED PROVIDER NOTE - NSFOLLOWUPINSTRUCTIONS_ED_ALL_ED_FT
- Return to the ED for any new, worsening, or concerning symptoms to you    - Continue all prescribed medications    - Take ibuprofen/tylenol as directed as needed for pain  To control your pain at home, you should take Ibuprofen 400 mg along with Tylenol 650mg-1000mg every 6 to 8 hours. Limit your maximum daily Tylenol from all sources to 4000mg. Be aware that many other medications contain acetaminophen which is also known as Tylenol. Taking Tylenol and Ibuprofen together has been shown to be more effective at relieving pain than taking them separately. These are both over the counter medications that you can  at your local pharmacy without a prescription. You need to respect all of the warnings on the bottles. You shouldn’t take these medications for more than a week without following up with your doctor. Both medications come with certain risks and side effects that you need to discuss with your doctor, especially if you are taking them for a prolonged period.    - Rest and keep yourself hydrated with fluids

## 2021-11-20 NOTE — ED ADULT NURSE NOTE - NS ED PATIENT SAFETY CONERN FT
patient states she is staying at a shelter and does not feel Safe there she would like new placement

## 2021-11-20 NOTE — ED PROVIDER NOTE - PROGRESS NOTE DETAILS
Jenn MAYA (PGY-3): Pt requesting SW in AM, lives at shelter, requesting to change shelters states she does not feel safe at current shelter, patient medically cleared for discharge, will await SW.

## 2021-11-20 NOTE — ED PROVIDER NOTE - ATTENDING CONTRIBUTION TO CARE
61 year old female with history of HTN, HLD, DM presented to ED with bilateral burning foot pain for the past week. No trauma to the LE. No signs of infections. No calf tenderness. Likely peripheral neuropathy secondary to uncontrolled DM. Plan: Gabapentin. Endocrine referral.

## 2021-11-20 NOTE — ED ADULT NURSE NOTE - OBJECTIVE STATEMENT
60 y/o female coming to the ER with c/o b/l leg pain x 2 weeks. 60 y/o female coming to the ER with c/o b/l leg pain x 2 weeks. A&Ox4. Ambulatory. PMH DM. Patient states she has had worsening non traumatic b/l leg pain x 2 weeks. Patient describes it as "itching and burning." Patient also endorses intermittent nausea as well as one episode of vomiting during the week. Patient has equal sensation b/l in the lower extremity. Patient is able to move her toes/feet. Palpable b/l pedal pulses. Patient denies chest pain, fever, chills, n/v/d, urinary symptoms, abdominal pain. Safety measures maintained. Bed in the lowest position. No acute distress noted or further complaints at this time.

## 2021-11-20 NOTE — CHART NOTE - NSCHARTNOTEFT_GEN_A_CORE
Patient was referred to Social work by Psychiatry [facility] for inpatient psychiatric admission. Chart was reviewed.  Patient is a 26-year-old single male presented to the ED for manic behavior. increased depressive symptoms.   Per psychiatry patient to be involuntarily admitted with a diagnosis of Psychosis NOS.  LMSW contacted [facility].  There are no beds available at this time. Attending physician and psychiatrist made aware.  Patient to remain in the emergency room on 1:1 for safety.  Patient’s mother made aware.  Patient is uninsured.  LMSW will endorse to incoming  for further follow up in the morning.  Telepsych notification completed as per protocol.  LSMW will continue to follow up as needed Patent is a 61 year old female presented to the ED for bilateral LE swelling. Medical chart was reviewed. Per chart review patient's medical history includes: Bipolar 1 disorder, H/O: HTN (hypertension), PTSD (post-traumatic stress disorder), Pulmonary embolism 2020, Recurrent Clostridioides difficile infection, Recurrent pancreatitis and Schizoaffective disorder.   LMSW introduced herself to the patient and she verbalized understanding the role of the .  Patient is alert and oriented x 4 spheres.  Patient report she was living in a shelter with her 92 year old mother (presently in [facility]). Patient reports she has been living in nursing homes with her mother for the past two years.  PTA she and mother was living in a shelter in [place].  Patient receives $400 monthly.  LMSW advised patient to secure housing for herself and then have her mother live with  her.  LMSW encouraged patient to     Patient has Medicare insurance benefit.  Patient requested to be transported to the shelter in [place]. LMSW informed patient she does not meet the medical criteria for ambulette.  LMSW provided patient with a letter for the shelter and a [employer].  LMSW allowed patient to visit with her mother.  Patient reportedly does not have a phone at this time.  Patient is able to carry her bags and ambulate independently to [facility]. No social work barriers for discharge. LMSW will follow up as needed.

## 2021-11-20 NOTE — ED PROVIDER NOTE - PATIENT PORTAL LINK FT
You can access the FollowMyHealth Patient Portal offered by [facility] by registering at the following website: [email or web address]. By joining [employer]’s FollowMyHealth portal, you will also be able to view your health information using other applications (apps) compatible with our system.

## 2021-11-20 NOTE — ED PROVIDER NOTE - NSICDXPASTMEDICALHX_GEN_ALL_CORE_FT
PAST MEDICAL HISTORY:  Bipolar 1 disorder     H/O: HTN (hypertension)     PTSD (post-traumatic stress disorder)     Pulmonary embolism 2020    Recurrent Clostridioides difficile infection     Recurrent pancreatitis     Schizoaffective disorder

## 2021-11-20 NOTE — ED ADULT NURSE REASSESSMENT NOTE - NS ED NURSE REASSESS COMMENT FT1
Patient able to eat, patient given a sandwich, diet ginger ale, and gin crackers. Patient states that she does not feel Safe at the shelter where she is staying and would like to speak to social work in the morning to try and find a new placement. Patient states she is not abused or threatened there, but that she feels should find better placement. [facility] and [facility] aware. Plan to see social work in the morning.

## 2021-11-20 NOTE — ED ADULT NURSE NOTE - NSIMPLEMENTINTERV_GEN_ALL_ED
Implemented All Fall Risk Interventions:  [place] to call system. Call bell, personal items and telephone within reach. Instruct patient to call for assistance. Room bathroom lighting operational. Non-slip footwear when patient is off stretcher. Physically safe environment: no spills, clutter or unnecessary equipment. Stretcher in lowest position, wheels locked, appropriate side rails in place. Provide visual cue, wrist band, yellow gown, etc. Monitor gait and stability. Monitor for mental status changes and reorient to person, place, and time. Review medications for side effects contributing to fall risk. Reinforce activity limits and safety measures with patient and family.

## 2021-11-27 ENCOUNTER — EMERGENCY (EMERGENCY)
Facility: HOSPITAL | Age: 61
LOS: 1 days | Discharge: HOME | End: 2021-11-27
Attending: STUDENT IN AN ORGANIZED HEALTH CARE EDUCATION/TRAINING PROGRAM | Admitting: STUDENT IN AN ORGANIZED HEALTH CARE EDUCATION/TRAINING PROGRAM
Payer: MEDICARE

## 2021-11-27 VITALS
RESPIRATION RATE: 16 BRPM | HEART RATE: 80 BPM | SYSTOLIC BLOOD PRESSURE: 144 MMHG | OXYGEN SATURATION: 99 % | TEMPERATURE: 98 F | DIASTOLIC BLOOD PRESSURE: 64 MMHG

## 2021-11-27 VITALS
DIASTOLIC BLOOD PRESSURE: 79 MMHG | RESPIRATION RATE: 18 BRPM | TEMPERATURE: 98 F | HEIGHT: 66 IN | SYSTOLIC BLOOD PRESSURE: 140 MMHG | OXYGEN SATURATION: 96 % | HEART RATE: 89 BPM

## 2021-11-27 DIAGNOSIS — Z90.710 ACQUIRED ABSENCE OF BOTH CERVIX AND UTERUS: Chronic | ICD-10-CM

## 2021-11-27 LAB
ALBUMIN SERPL ELPH-MCNC: 3.4 G/DL — SIGNIFICANT CHANGE UP (ref 3.3–5)
ALP SERPL-CCNC: 54 U/L — SIGNIFICANT CHANGE UP (ref 40–120)
ALT FLD-CCNC: 9 U/L — SIGNIFICANT CHANGE UP (ref 4–33)
ANION GAP SERPL CALC-SCNC: 11 MMOL/L — SIGNIFICANT CHANGE UP (ref 7–14)
AST SERPL-CCNC: 17 U/L — SIGNIFICANT CHANGE UP (ref 4–32)
BASOPHILS # BLD AUTO: 0 K/UL — SIGNIFICANT CHANGE UP (ref 0–0.2)
BASOPHILS NFR BLD AUTO: 0 % — SIGNIFICANT CHANGE UP (ref 0–2)
BILIRUB SERPL-MCNC: <0.2 MG/DL — SIGNIFICANT CHANGE UP (ref 0.2–1.2)
BUN SERPL-MCNC: 10 MG/DL — SIGNIFICANT CHANGE UP (ref 7–23)
CALCIUM SERPL-MCNC: 8.4 MG/DL — SIGNIFICANT CHANGE UP (ref 8.4–10.5)
CHLORIDE SERPL-SCNC: 114 MMOL/L — HIGH (ref 98–107)
CO2 SERPL-SCNC: 20 MMOL/L — LOW (ref 22–31)
CREAT SERPL-MCNC: 0.83 MG/DL — SIGNIFICANT CHANGE UP (ref 0.5–1.3)
EOSINOPHIL # BLD AUTO: 0.04 K/UL — SIGNIFICANT CHANGE UP (ref 0–0.5)
EOSINOPHIL NFR BLD AUTO: 0.9 % — SIGNIFICANT CHANGE UP (ref 0–6)
GLUCOSE SERPL-MCNC: 105 MG/DL — HIGH (ref 70–99)
HCT VFR BLD CALC: 28.9 % — LOW (ref 34.5–45)
HGB BLD-MCNC: 9.5 G/DL — LOW (ref 11.5–15.5)
IANC: 2.5 K/UL — SIGNIFICANT CHANGE UP (ref 1.5–8.5)
LYMPHOCYTES # BLD AUTO: 0.85 K/UL — LOW (ref 1–3.3)
LYMPHOCYTES # BLD AUTO: 21.9 % — SIGNIFICANT CHANGE UP (ref 13–44)
MAGNESIUM SERPL-MCNC: 1.6 MG/DL — SIGNIFICANT CHANGE UP (ref 1.6–2.6)
MCHC RBC-ENTMCNC: 30.5 PG — SIGNIFICANT CHANGE UP (ref 27–34)
MCHC RBC-ENTMCNC: 32.9 GM/DL — SIGNIFICANT CHANGE UP (ref 32–36)
MCV RBC AUTO: 92.9 FL — SIGNIFICANT CHANGE UP (ref 80–100)
MONOCYTES # BLD AUTO: 0.34 K/UL — SIGNIFICANT CHANGE UP (ref 0–0.9)
MONOCYTES NFR BLD AUTO: 8.8 % — SIGNIFICANT CHANGE UP (ref 2–14)
NEUTROPHILS # BLD AUTO: 2.63 K/UL — SIGNIFICANT CHANGE UP (ref 1.8–7.4)
NEUTROPHILS NFR BLD AUTO: 67.5 % — SIGNIFICANT CHANGE UP (ref 43–77)
PHOSPHATE SERPL-MCNC: 3.6 MG/DL — SIGNIFICANT CHANGE UP (ref 2.5–4.5)
PLATELET # BLD AUTO: 209 K/UL — SIGNIFICANT CHANGE UP (ref 150–400)
POTASSIUM SERPL-MCNC: 3.7 MMOL/L — SIGNIFICANT CHANGE UP (ref 3.5–5.3)
POTASSIUM SERPL-SCNC: 3.7 MMOL/L — SIGNIFICANT CHANGE UP (ref 3.5–5.3)
PROT SERPL-MCNC: 6.3 G/DL — SIGNIFICANT CHANGE UP (ref 6–8.3)
RBC # BLD: 3.11 M/UL — LOW (ref 3.8–5.2)
RBC # FLD: 14.6 % — HIGH (ref 10.3–14.5)
SODIUM SERPL-SCNC: 145 MMOL/L — SIGNIFICANT CHANGE UP (ref 135–145)
WBC # BLD: 3.89 K/UL — SIGNIFICANT CHANGE UP (ref 3.8–10.5)
WBC # FLD AUTO: 3.89 K/UL — SIGNIFICANT CHANGE UP (ref 3.8–10.5)

## 2021-11-27 PROCEDURE — 93970 EXTREMITY STUDY: CPT | Mod: 26

## 2021-11-27 PROCEDURE — 70450 CT HEAD/BRAIN W/O DYE: CPT | Mod: 26,MA

## 2021-11-27 PROCEDURE — 99284 EMERGENCY DEPT VISIT MOD MDM: CPT

## 2021-11-27 RX ORDER — METOCLOPRAMIDE HCL 10 MG
10 TABLET ORAL ONCE
Refills: 0 | Status: COMPLETED | OUTPATIENT
Start: 2021-11-27 | End: 2021-11-27

## 2021-11-27 RX ORDER — IBUPROFEN 200 MG
600 TABLET ORAL ONCE
Refills: 0 | Status: COMPLETED | OUTPATIENT
Start: 2021-11-27 | End: 2021-11-27

## 2021-11-27 RX ORDER — SODIUM CHLORIDE 9 MG/ML
1000 INJECTION INTRAMUSCULAR; INTRAVENOUS; SUBCUTANEOUS ONCE
Refills: 0 | Status: COMPLETED | OUTPATIENT
Start: 2021-11-27 | End: 2021-11-27

## 2021-11-27 RX ORDER — ACETAMINOPHEN 500 MG
650 TABLET ORAL ONCE
Refills: 0 | Status: COMPLETED | OUTPATIENT
Start: 2021-11-27 | End: 2021-11-27

## 2021-11-27 RX ADMIN — Medication 600 MILLIGRAM(S): at 23:34

## 2021-11-27 RX ADMIN — SODIUM CHLORIDE 1000 MILLILITER(S): 9 INJECTION INTRAMUSCULAR; INTRAVENOUS; SUBCUTANEOUS at 20:10

## 2021-11-27 RX ADMIN — Medication 10 MILLIGRAM(S): at 20:11

## 2021-11-27 RX ADMIN — Medication 650 MILLIGRAM(S): at 20:11

## 2021-11-27 NOTE — ED PROVIDER NOTE - CLINICAL SUMMARY MEDICAL DECISION MAKING FREE TEXT BOX
61y Female with PMHx of HTN, DM, recurrent C diff presents to the ER for headache x 3 days. Constant, achy frontal headache which radiates to both sides of her head. Denies fever, chills, neck pain, acute changes in vision, hearing, nausea, vomiting. Bilateral leg pain that has been getting progressively worse mainly from the knee down. Denies tobacco use, recent surgeries, long car rides or plane rides. Denies history of blood clots. Vital signs stable, bilateral calf tenderness, no focal neuro deficit. Concern for headache vs migraine, msk pain, low suspicion for DVTs or acute intracranial pathology. Will get labs, CT, US, reassess.

## 2021-11-27 NOTE — ED PROVIDER NOTE - ATTENDING CONTRIBUTION TO CARE
62 yo female with PMH HTN, DM, recurrent Cdiff presents to ED for evaluation of headache x 3 days. Denies sensorimotor deficits, visual changes. ALso reporting BL LE pain. PE: neuro intact, no leg swelling noted + ttp bl, compartments soft, 2+DP BL  A/P Labs, imaging, medicate, reassess

## 2021-11-27 NOTE — ED PROVIDER NOTE - NSICDXPASTMEDICALHX_GEN_ALL_CORE_FT
PAST MEDICAL HISTORY:  Bipolar disorder     DM (diabetes mellitus)     HLD (hyperlipidemia)     HTN (hypertension)     PTSD (post-traumatic stress disorder)

## 2021-11-27 NOTE — ED ADULT TRIAGE NOTE - CHIEF COMPLAINT QUOTE
c/o bilat foot pain and bilat knee pain c/o bilat foot pain and bilat knee pain ALERT :: MULTIPLE ALLERGY

## 2021-11-27 NOTE — ED PROVIDER NOTE - NSFOLLOWUPINSTRUCTIONS_ED_ALL_ED_FT
Today you were seen in the ER for headache and leg pain.     Take Motrin 600 mg every 8 hours as needed for moderate pain or fevers -- take with food.    Take Tylenol 650mg (Two 325 mg pills) every 4-6 hours as needed for pain or fevers.    Headache    A headache is pain or discomfort felt around the head or neck area. The specific cause of a headache may not be found as there are many types including tension headaches, migraine headaches, and cluster headaches. Watch your condition for any changes. Things you can do to manage your pain include taking over the counter and prescription medications as instructed by your health care provider, lying down in a dark quiet room, limiting stress, getting regular sleep, and refraining from alcohol and tobacco products.    SEEK IMMEDIATE MEDICAL CARE IF YOU HAVE ANY OF THE FOLLOWING SYMPTOMS: fever, vomiting, stiff neck, loss of vision, problems with speech, muscle weakness, loss of balance, trouble walking, passing out, or confusion.    Advance activity as tolerated.     Continue all previously prescribed medications as directed unless otherwise instructed.     Follow up with your primary care physician, ortho and pain management in 48-72 hours- bring copies of your results.

## 2021-11-27 NOTE — ED PROVIDER NOTE - OBJECTIVE STATEMENT
61y Female with PMHx of HTN, DM, recurrent C diff presents to the ER for headache x 3 days. 61y Female with PMHx of HTN, DM, recurrent C diff presents to the ER for headache x 3 days. Patient reports constant, achy frontal headache which radiates to both sides of her head. Denies fever, chills, neck pain, acute changes in vision, hearing, nausea, vomiting. In addition, reports bilateral leg pain that has been getting progressively worse mainly from the knee down. Denies tobacco use, recent surgeries, long car rides or plane rides. Denies history of blood clots.

## 2021-11-27 NOTE — ED PROVIDER NOTE - NS ED ROS FT
Constitutional: (-) Fever, (-) Chills  Skin: (-) Color changes, (-) Rashes, (-) Wounds  Eyes: (-) Visual changes, (-) Discharge, (-) Redness  Ears: (-) Hearing loss, (-)Tinnitus, (-) Ear pain  CV: (-) Chest pain, (-) Palpitations  Resp: (-) Cough, (-) Shortness of breath,  GI: (-) Abdominal pain, (-) Nausea, (-) Vomiting, (-) Diarrhea  : (-) Dysuria, (-) Hematuria, (-) Increased frequency  MSK: (+) Myalgias, (-) Back pain, (-) Neck pain  Neuro: (+) Headache

## 2021-11-27 NOTE — ED PROVIDER NOTE - PATIENT PORTAL LINK FT
You can access the FollowMyHealth Patient Portal offered by [facility] by registering at the following website: [email or web address]. By joining [employer]’s FollowMyHealth portal, you will also be able to view your health information using other applications (apps) compatible with our system.

## 2021-11-27 NOTE — ED ADULT NURSE NOTE - OBJECTIVE STATEMENT
received pt in intake room 1, 61 yr/o female A+Ox4, ambulatory at baseline. presented to the ED C/O sever 10/10 head and B/L LE pain. pt states pain began 11/24 head is aching complaining on N/V and dizziness, denies blurred vision facial symmetry noted. B/L LE pain is sharp, pt states pain has affected her mobility, no deficits noted with active and passive ROM. pt denies chest pain and SOB, RR even and unlabored. will continue to monitor.

## 2021-11-28 NOTE — PROVIDER CONTACT NOTE (OTHER) - ASSESSMENT
Writer informed by provider that pt pending discharge had requested to speak with SW. Writer met with pt at bedside. Pt's mother also said to be receiving treatment in ED with said plan of signing self out AMA. Pt upon meeting reports plan for discharge of potentially returning to [facility]. Pt does not provide information regarding where she has been staying in recent days reporting that her mother was in hospital and that she also had recent stay in hospital. Pt reports that she is looking to stay some where short term as she has plans to travel to [place] next week for said surgery. Pt expresses desire to return to [facility]. Pt provided with shelter resource/drop in center with [facility] address listed. Pt interested in waiting for mother at this time. Pt also inquired about medicaid enrollment with medicaid marketplace information provided to her with telephone number for further information.

## 2021-11-28 NOTE — PROVIDER CONTACT NOTE (OTHER) - ACTION/TREATMENT ORDERED:
Pt's mother now with pt said to have signed out AMA. Pt given resources and provided 2 metro cards #[phone] for travel. No further SW intervention required at this time.

## 2022-08-26 NOTE — ED ADULT NURSE NOTE - PAIN: RADIATION
Patient referred by Tricia Giraldo RN for Supportive Counseling. Introduced supportive counseling and patient interested in care. Briefly talked about normal emotions of loss of control, grief and ways to cope. Writer sent resources through my chart and patient made telephone appointment with  for 9/1/22 at 3:00pm.  
LE and head pain

## 2022-10-04 NOTE — ED ADULT TRIAGE NOTE - NS ED NOTE AC HIGH RISK COUNTRIES
Case request initiated for Cardiac Cath at [facility]. Sent to ROME Nazario to assist patient in scheduling.        No

## 2023-01-24 NOTE — PATIENT PROFILE ADULT - LEGAL HELP
Subjective   Lacho Estrada is a 31 y.o. male is being seen for consultation today at the request of Provider, No Known    Lacho Estrada is a 31 y.o. male History of Present Illness  With painful bulge in the right groin.  Patient has recently recovered from prostatitis and still has painful bulge radiating to the right groin.  He has a previous open inguinal repair as a child.  No obstructive symptoms noted.  He does not smoke.  Body mass index 26.54  Hernia  Associated symptoms include abdominal pain. Pertinent negatives include no chest pain, chills, coughing, fever, headaches, joint swelling, nausea, rash, sore throat, vomiting or weakness.       History reviewed. No pertinent past medical history.    Family History   Problem Relation Age of Onset   • Cancer Father         Started in the tube from the kidney to the bladder.       Social History     Socioeconomic History   • Marital status:    Tobacco Use   • Smoking status: Never   • Smokeless tobacco: Former   Substance and Sexual Activity   • Alcohol use: Not Currently   • Drug use: Never   • Sexual activity: Not Currently       Past Surgical History:   Procedure Laterality Date   • APPENDECTOMY  3-6 months old    I answered yes because I had a hernia surgery when I was 3-6 months old and I do believe they took my appendix out as well. Not 100% sure       Review of Systems   Constitutional: Negative for activity change, appetite change, chills and fever.   HENT: Negative for sore throat and trouble swallowing.    Eyes: Negative for visual disturbance.   Respiratory: Negative for cough and shortness of breath.    Cardiovascular: Negative for chest pain and palpitations.   Gastrointestinal: Positive for abdominal pain. Negative for abdominal distention, blood in stool, constipation, diarrhea, nausea and vomiting.   Endocrine: Negative for cold intolerance and heat intolerance.   Genitourinary: Negative for dysuria.   Musculoskeletal: Negative for joint  "swelling.   Skin: Negative for color change, rash and wound.   Allergic/Immunologic: Negative for immunocompromised state.   Neurological: Negative for dizziness, seizures, weakness and headaches.   Hematological: Negative for adenopathy. Does not bruise/bleed easily.   Psychiatric/Behavioral: Negative for agitation and confusion.         /86   Ht 177.8 cm (70\")   Wt 83.9 kg (185 lb)   BMI 26.54 kg/m²   Objective   Physical Exam  Constitutional:       Appearance: He is well-developed.   HENT:      Head: Normocephalic and atraumatic.   Eyes:      Conjunctiva/sclera: Conjunctivae normal.      Pupils: Pupils are equal, round, and reactive to light.   Neck:      Thyroid: No thyromegaly.      Vascular: No JVD.      Trachea: No tracheal deviation.   Cardiovascular:      Rate and Rhythm: Normal rate and regular rhythm.      Heart sounds: No murmur heard.    No friction rub. No gallop.   Pulmonary:      Effort: Pulmonary effort is normal.      Breath sounds: Normal breath sounds.   Abdominal:      General: There is no distension.      Palpations: Abdomen is soft. There is no hepatomegaly or splenomegaly.      Tenderness: There is no abdominal tenderness.      Hernia: A hernia is present. Hernia is present in the right inguinal area.   Musculoskeletal:         General: No deformity. Normal range of motion.      Cervical back: Neck supple.   Skin:     General: Skin is warm and dry.   Neurological:      Mental Status: He is alert and oriented to person, place, and time.               Assessment   Diagnoses and all orders for this visit:    1. Unilateral recurrent inguinal hernia without obstruction or gangrene (Primary)  -     Case Request; Standing  -     sodium chloride 0.9 % flush 10 mL  -     sodium chloride 0.9 % flush 10 mL  -     sodium chloride 0.9 % infusion 40 mL  -     ceFAZolin 2 gm IVPB in 100 mL NS (VTB)  -     Case Request    Other orders  -     Follow Anesthesia Guidelines / Protocol; Future  -     " Obtain Informed Consent; Future  -     Provide NPO Instructions to Patient; Future  -     Chlorhexidine Skin Prep; Future  -     Follow Anesthesia Guidelines / Protocol; Standing  -     Verify / Perform Chlorhexidine Skin Prep; Standing  -     Verify / Perform Chlorhexidine Skin Prep if Indicated (If Not Already Completed); Standing  -     Instructions on coughing, deep breathing, and incentive spirometry.; Standing  -     Insert Peripheral IV; Standing  -     Saline Lock & Maintain IV Access; Standing      Lacho Estrada is a 31 y.o. male with reducible right possible left inguinal.  Patient understands the risks and benefits of surgery and will undergo robotic right possible bilateral inguinal hernia repair with mesh.    BMI is >= 25 and <30. (Overweight) The following options were offered after discussion;: weight loss educational material (shared in after visit summary)              no

## 2023-09-20 NOTE — ED PROVIDER NOTE - OBJECTIVE STATEMENT
Patient currently does not meet hospice criteria. Family is open to palliative care at [facility]. Patient will likely be admitted for SNF placement. Spoke to [facility] and made copies of [facility] for chart. Reviewed code status and she elected for [facility], papers filled out and RN notified. 61y F w/ PMHx HTN, T2DM, HLD presenting with one week of B/L burning pain to feet. Denies recent trauma or swelling to her feet. Denies previous hx of similar symptoms in the past. Follows with a PMD for her DM but does not see an Endocrinologist. States she does not check her finger sticks at home and goes off "how I'm feeling" to know if her sugar is low or high. Denies previous hx of DKA/HHS. Came to ED to be with her mother who is pt in gold, while here, went to triage complaining of B/L foot pain. Denies fever, chills, nausea, HESTER, chest pain, vomiting, abd pain, increased urinary frequency, dysuria, hematuria, diarrhea, bloody stools, rash. States she is compliant with her diabetic medications, but is requesting rapid referral for Endo. Follows with PMD at [facility]. Denies wounds or trauma to feet.

## 2023-10-04 NOTE — PHYSICAL THERAPY INITIAL EVALUATION ADULT - ASSISTIVE DEVICE FOR TRANSFER: GAIT, REHAB EVAL
Addendum  created 10/04/23 1601 by Jean-Claude Burris MD    Order list changed, Pharmacy for encounter modified      
with no AD

## 2023-12-05 NOTE — H&P ADULT - PROBLEM SELECTOR PROBLEM 2
Call placed to Patient Mom.  Relayed KENYA Steve's message.  Verbalized understanding.  Terence Vieira RN     Abdominal pain

## 2024-01-24 NOTE — DISCHARGE NOTE PROVIDER - DISCHARGE DATE
"Called patient and offered first available Medicaid appointments at [facility] and [facility].  Patient declined offered appointments and stated "I guess I will go to urgent care.  I can't wait that long".  "
----- Message from Nila Munguia sent at 1/24/2024  8:17 AM CST -----  Contact: [phone]  Type:  Sooner Apoointment Request    Caller is requesting a sooner appointment.  Caller declined first available appointment listed below.  Caller will not accept being placed on the waitlist and is requesting a message be sent to doctor.  Name of Caller:Ruslan   When is the first available appointment?02/16/2024  Symptoms:WWE/STD screening   Would the patient rather a call back or a response via [facility]? Call back   Best Call Back Number:[phone]   Additional Information:      
17-Sep-2021

## 2024-03-24 RX ORDER — DEXAMETHASONE 0.5 MG/5ML
0 ELIXIR ORAL
Qty: 0 | Refills: 0 | DISCHARGE

## 2024-03-24 RX ORDER — RIVAROXABAN 15 MG-20MG
1 KIT ORAL
Qty: 0 | Refills: 0 | DISCHARGE

## 2024-03-24 RX ORDER — ALOSETRON HYDROCHLORIDE 0.5 MG/1
1 TABLET ORAL
Qty: 0 | Refills: 0 | DISCHARGE

## 2024-03-24 RX ORDER — MIGLITOL 100 MG/1
0 TABLET, COATED ORAL
Qty: 0 | Refills: 0 | DISCHARGE

## 2024-03-24 RX ORDER — OXYBUTYNIN CHLORIDE 5 MG
0 TABLET ORAL
Qty: 0 | Refills: 0 | DISCHARGE

## 2024-03-24 RX ORDER — OXYBUTYNIN CHLORIDE 5 MG
10 TABLET ORAL
Qty: 0 | Refills: 0 | DISCHARGE

## 2024-03-24 RX ORDER — FIDAXOMICIN 200 MG/5ML
0 GRANULE, FOR SUSPENSION ORAL
Qty: 0 | Refills: 0 | DISCHARGE

## 2024-03-24 RX ORDER — PRAZOSIN HCL 2 MG
0 CAPSULE ORAL
Qty: 0 | Refills: 0 | DISCHARGE

## 2024-03-24 RX ORDER — FLUTICASONE FUROATE AND VILANTEROL TRIFENATATE 100; 25 UG/1; UG/1
1 POWDER RESPIRATORY (INHALATION)
Qty: 0 | Refills: 0 | DISCHARGE

## 2024-03-24 RX ORDER — ONDANSETRON 8 MG/1
0 TABLET, FILM COATED ORAL
Qty: 0 | Refills: 0 | DISCHARGE

## 2024-03-24 RX ORDER — OMEPRAZOLE 10 MG/1
1 CAPSULE, DELAYED RELEASE ORAL
Qty: 0 | Refills: 0 | DISCHARGE

## 2024-03-24 RX ORDER — MESALAMINE 400 MG
0 TABLET, DELAYED RELEASE (ENTERIC COATED) ORAL
Qty: 0 | Refills: 0 | DISCHARGE

## 2024-03-24 RX ORDER — BISOPROLOL FUMARATE 10 MG/1
0 TABLET, FILM COATED ORAL
Qty: 0 | Refills: 0 | DISCHARGE

## 2024-03-24 RX ORDER — BREXPIPRAZOLE 0.25 MG/1
1 TABLET ORAL
Qty: 0 | Refills: 0 | DISCHARGE

## 2024-04-04 NOTE — ED PROVIDER NOTE - MUSCULOSKELETAL [-], MLM
Quality 47: Advance Care Plan: Advance Care Planning discussed and documented; advance care plan or surrogate decision maker documented in the medical record.
Quality 226: Preventive Care And Screening: Tobacco Use: Screening And Cessation Intervention: Patient screened for tobacco use and is an ex/non-smoker
Detail Level: Detailed
no back pain

## 2024-10-02 NOTE — ED ADULT TRIAGE NOTE - NSWEIGHTCALCTOOLDRUG_GEN_A_CORE
Continue Farxiga 10 mg daily  Complete and return the application for your Rybelsus. New blood sugar medication. Bring in a copy of your proof of income.  Bring in your blood sugar meter to the next visit  Reduce your current Vitamin C supplement to max of 500 mg daily (one-half tablet)   
 used

## 2025-06-20 NOTE — ED ADULT NURSE NOTE - ALCOHOL PRE SCREEN (AUDIT - C)
Pt arrives alert and oriented x4 and ambulatory from triage  Pt complains of epistaxis that has been happening every day for the past 6 days   Pt reports her nose bleeds are only about 10 minutes long  Pt denies blood thinners, injury/trauma, or headache   Pt does not have any active bleeding at this time   RR even and unlabored.   NAD noted.   Whiteboard updated.  Will continue with plan of care.     Statement Selected
